# Patient Record
Sex: FEMALE | Race: BLACK OR AFRICAN AMERICAN | NOT HISPANIC OR LATINO | Employment: PART TIME | URBAN - METROPOLITAN AREA
[De-identification: names, ages, dates, MRNs, and addresses within clinical notes are randomized per-mention and may not be internally consistent; named-entity substitution may affect disease eponyms.]

---

## 2017-04-19 ENCOUNTER — OFFICE VISIT (OUTPATIENT)
Dept: FAMILY MEDICINE | Facility: CLINIC | Age: 37
End: 2017-04-19
Attending: FAMILY MEDICINE
Payer: COMMERCIAL

## 2017-04-19 VITALS
SYSTOLIC BLOOD PRESSURE: 115 MMHG | DIASTOLIC BLOOD PRESSURE: 77 MMHG | BODY MASS INDEX: 31.96 KG/M2 | HEART RATE: 98 BPM | WEIGHT: 186.2 LBS

## 2017-04-19 DIAGNOSIS — N92.0 EXCESSIVE OR FREQUENT MENSTRUATION: ICD-10-CM

## 2017-04-19 DIAGNOSIS — G89.29 CHRONIC MIDLINE LOW BACK PAIN WITHOUT SCIATICA: ICD-10-CM

## 2017-04-19 DIAGNOSIS — M54.2 CERVICALGIA: Primary | ICD-10-CM

## 2017-04-19 DIAGNOSIS — M54.50 CHRONIC MIDLINE LOW BACK PAIN WITHOUT SCIATICA: ICD-10-CM

## 2017-04-19 DIAGNOSIS — K21.9 GASTROESOPHAGEAL REFLUX DISEASE WITHOUT ESOPHAGITIS: ICD-10-CM

## 2017-04-19 PROCEDURE — 99212 OFFICE O/P EST SF 10 MIN: CPT | Mod: ZF

## 2017-04-19 RX ORDER — NICOTINE POLACRILEX 4 MG/1
40 GUM, CHEWING ORAL DAILY
Qty: 180 TABLET | Refills: 0 | Status: SHIPPED | OUTPATIENT
Start: 2017-04-19 | End: 2017-07-18

## 2017-04-19 RX ORDER — MULTIVIT,IRON,MINERALS/LUTEIN
1 TABLET ORAL DAILY
Qty: 90 TABLET | Refills: 3 | Status: SHIPPED | OUTPATIENT
Start: 2017-04-19 | End: 2017-07-18

## 2017-04-19 RX ORDER — HYDROCODONE BITARTRATE AND ACETAMINOPHEN 5; 325 MG/1; MG/1
1-2 TABLET ORAL EVERY 4 HOURS PRN
Qty: 15 TABLET | Refills: 0 | Status: CANCELLED | OUTPATIENT
Start: 2017-04-19

## 2017-04-19 ASSESSMENT — PAIN SCALES - GENERAL: PAINLEVEL: SEVERE PAIN (6)

## 2017-04-19 NOTE — MR AVS SNAPSHOT
After Visit Summary   4/19/2017    Tanya Denny    MRN: [id number]           Patient Information     Date Of Birth          1980        Visit Information        Provider Department      4/19/2017 10:40 AM Frandy Lutz MD [facility]        Today's Diagnoses     Cervicalgia    -  1    Excessive or frequent menstruation        Gastroesophageal reflux disease without esophagitis        Chronic midline low back pain without sciatica           Follow-ups after your visit        Additional Services     PHYSICAL THERAPY REFERRAL       [facility] provides Physical Therapy evaluation and treatment and many specialty services across the [facility].  If requesting a specialty program, please choose from the list below.    If you have not heard from the scheduling office within 2 business days, please call [phone] for all locations, with the exception of [place], please call [phone].  Treatment: Evaluation & Treatment  Special Instructions/Modalities:   Special Programs: back and neck pain, soft tissue    Please be aware that coverage of these services is subject to the terms and limitations of your health insurance plan.  Call member services at your health plan with any benefit or coverage questions.      **Note to Provider:  If you are referring outside of [place] for the therapy appointment, please list the name of the location in the  special instructions  above, print the referral and give to the patient to schedule the appointment.                  Follow-up notes from your care team     Return in about 4 months (around 8/19/2017).      Who to contact     Please call your clinic at [phone] to:    Ask questions about your health    Make or cancel appointments    Discuss your medicines    Learn about your test results    Speak to your doctor   If you have compliments or concerns about an experience at your clinic, or if you  wish to file a complaint, please contact [facility] Patient Relations at [phone] or email us at [email or web address]         Additional Information About Your Visit        Typo Keyboardshart Information     [employer] gives you secure access to your electronic health record. If you see a primary care provider, you can also send messages to your care team and make appointments. If you have questions, please call your primary care clinic.  If you do not have a primary care provider, please call [phone] and they will assist you.      [employer] is an electronic gateway that provides easy, online access to your medical records. With [employer], you can request a clinic appointment, read your test results, renew a prescription or communicate with your care team.     To access your existing account, please contact your [facility] or call [phone] for assistance.        Care EveryWhere ID     This is your Care EveryWhere ID. This could be used by other organizations to access your [place] medical records  [id number]        Your Vitals Were     Pulse Last Period Breastfeeding? BMI (Body Mass Index)          98 04/12/2017 No 31.96 kg/m2         Blood Pressure from Last 3 Encounters:   04/19/17 115/77   12/13/16 117/76   10/12/16 104/64    Weight from Last 3 Encounters:   04/19/17 84.5 kg (186 lb 3.2 oz)   12/13/16 82.5 kg (181 lb 14.4 oz)   10/12/16 79.4 kg (175 lb)              We Performed the Following     PHYSICAL THERAPY REFERRAL          Where to get your medicines      These medications were sent to [facility]/pharmacy #[id number] - [place], [place] - [address]  [address] [address]     Phone:  [phone]     MULTI PRENATAL 27-0.8 MG Tabs    omeprazole 20 MG tablet          Primary Care Provider    Pcp Unknown Verified       No address on file        Thank you!     Thank you for choosing [facility]  for your  care. Our goal is always to provide you with excellent care. Hearing back from our patients is one way we can continue to improve our services. Please take a few minutes to complete the written survey that you may receive in the mail after your visit with us. Thank you!             Your Updated Medication List - Protect others around you: Learn how to safely use, store and throw away your medicines at www.disposemymeds.org.          This list is accurate as of: 4/19/17 11:59 PM.  Always use your most recent med list.                   Brand Name Dispense Instructions for use    ferrous sulfate 325 (65 FE) MG tablet    IRON    90 tablet    Take 1 tablet (325 mg) by mouth 2 times daily For 4 weeks, then daily for 4 weeks       fluconazole 150 MG tablet    DIFLUCAN    1 tablet    Take 1 tablet (150 mg) by mouth every 3 days       MULTI PRENATAL 27-0.8 MG Tabs     90 tablet    Take 1 tablet by mouth daily       omeprazole 20 MG tablet     180 tablet    Take 2 tablets (40 mg) by mouth daily Take 30-60 minutes before a meal.

## 2017-04-19 NOTE — PROGRESS NOTES
HPI  Pt. Here for headaches, neck and back pain    1. Headaches--Last seen by me 1 year ago, given imitrex. Headaches felt better for long time, but increased in frequency and severity x 1 month. No changes in life or health 1 month ago. Severity and intensity vary by week, with this week headaches daily from Saturday to Wednesday. Past visits, HA's were unilateral and throbbing.    Now, wakes up with HA, squeezing pain at neck and radiates to forehead. No nausea, but photophobia, tried ice pack. Ibuprofen 600 mg helps sometimes, imitrex does not. Of note, Periods heavy and erratic; trying to lose weight, portion control.     2. Neck and back pain--same chronic pain in past. Physical therapy from last year was helpful, seeing PT now but needs referral to [facility]. No weakness or numbness, no problems with urination.     3. Finger/thumb pain R hand: MCP and soft tissue around base index and thumb recently painful after repetitive hard work--brushing hair, gardening, houswork. No redness or warmth, does swell occasionally.   4. Wt--discusse than recent wt gain from 10/2016 to now, eats sweets  is sweets, soluttion no keep sweets in house    Review of Systems     Cardiovascular:  Negative for edema.   All other systems reviewed and are negative.      Physical Exam   Constitutional: She is oriented to person, place, and time and well-developed, well-nourished, and in no distress.   Eyes: Pupils are equal, round, and reactive to light.   Cardiovascular: Normal rate, regular rhythm and normal heart sounds.    Pulmonary/Chest: Effort normal and breath sounds normal.   Musculoskeletal: Normal range of motion. She exhibits tenderness. She exhibits no edema.   MCP: nontender  Neck and back: soft tissues tender to palpation bilaterally, vertebrae nontender   Neurological: She is alert and oriented to person, place, and time. She has normal reflexes. No cranial nerve deficit. She exhibits normal muscle tone.  Gait normal. Coordination normal.     A/P  1. Chronic HA, now tension type  Recommend not using imitrex for this HA, can use her ibuprofen. PT should assist with this, also trial of flexeril 10 mg prn  2. Neck and back pain--will refer to PT as above  3. Hand/finger pain--counseled on repetitive motion injury, avoid doing all tasks in same day,   4. Weight--counseled on diet, managing weight while taking care of children and house, focus on 10# weight loss; patient led plan for keeping sweets out of house    F/u 4 months

## 2017-04-19 NOTE — LETTER
4/19/2017       RE: Tanya Denny  [address]  [place] [address]     Dear Colleague,    Thank you for referring your patient, Tanya Denny, to the [facility] at [facility]. Please see a copy of my visit note below.    HPI  Pt. Here for headaches, neck and back pain    1. Headaches--Last seen by me 1 year ago, given imitrex. Headaches felt better for long time, but increased in frequency and severity x 1 month. No changes in life or health 1 month ago. Severity and intensity vary by week, with this week headaches daily from Saturday to Wednesday. Past visits, HA's were unilateral and throbbing.    Now, wakes up with HA, squeezing pain at neck and radiates to forehead. No nausea, but photophobia, tried ice pack. Ibuprofen 600 mg helps sometimes, imitrex does not. Of note, Periods heavy and erratic; trying to lose weight, portion control.     2. Neck and back pain--same chronic pain in past. Physical therapy from last year was helpful, seeing PT now but needs referral to [facility]. No weakness or numbness, no problems with urination.     3. Finger/thumb pain R hand: MCP and soft tissue around base index and thumb recently painful after repetitive hard work--brushing hair, gardening, houswork. No redness or warmth, does swell occasionally.   4. Wt--discusse than recent wt gain from 10/2016 to now, eats sweets  is sweets, soluttion no keep sweets in house    Review of Systems     Cardiovascular:  Negative for edema.   All other systems reviewed and are negative.      Physical Exam   Constitutional: She is oriented to person, place, and time and well-developed, well-nourished, and in no distress.   Eyes: Pupils are equal, round, and reactive to light.   Cardiovascular: Normal rate, regular rhythm and normal heart sounds.    Pulmonary/Chest: Effort normal and breath sounds normal.   Musculoskeletal: Normal range  of motion. She exhibits tenderness. She exhibits no edema.   MCP: nontender  Neck and back: soft tissues tender to palpation bilaterally, vertebrae nontender   Neurological: She is alert and oriented to person, place, and time. She has normal reflexes. No cranial nerve deficit. She exhibits normal muscle tone. Gait normal. Coordination normal.     A/P  1. Chronic HA, now tension type  Recommend not using imitrex for this HA, can use her ibuprofen. PT should assist with this, also trial of flexeril 10 mg prn  2. Neck and back pain--will refer to PT as above  3. Hand/finger pain--counseled on repetitive motion injury, avoid doing all tasks in same day,   4. Weight--counseled on diet, managing weight while taking care of children and house, focus on 10# weight loss; patient led plan for keeping sweets out of house    F/u 4 months    Again, thank you for allowing me to participate in the care of your patient.      Sincerely,    Frandy Lutz MD

## 2017-04-19 NOTE — NURSING NOTE
Chief Complaint   Patient presents with     RECHECK     C/O headache and neck pain   Alysia Rogel LPN

## 2017-04-20 ENCOUNTER — TELEPHONE (OUTPATIENT)
Dept: OBGYN | Facility: CLINIC | Age: 37
End: 2017-04-20

## 2017-04-20 DIAGNOSIS — G43.009 MIGRAINE WITHOUT AURA AND WITHOUT STATUS MIGRAINOSUS, NOT INTRACTABLE: ICD-10-CM

## 2017-04-20 DIAGNOSIS — G89.29 OTHER CHRONIC PAIN: ICD-10-CM

## 2017-04-20 DIAGNOSIS — G44.219 EPISODIC TENSION-TYPE HEADACHE, NOT INTRACTABLE: ICD-10-CM

## 2017-04-20 DIAGNOSIS — N92.0 EXCESSIVE OR FREQUENT MENSTRUATION: ICD-10-CM

## 2017-04-20 RX ORDER — IBUPROFEN 200 MG
600 TABLET ORAL EVERY 6 HOURS PRN
Qty: 60 TABLET | Refills: 1 | Status: SHIPPED | OUTPATIENT
Start: 2017-04-20 | End: 2017-08-02

## 2017-04-20 RX ORDER — CYCLOBENZAPRINE HCL 10 MG
10 TABLET ORAL 3 TIMES DAILY PRN
Qty: 30 TABLET | Refills: 3 | Status: SHIPPED | OUTPATIENT
Start: 2017-04-20 | End: 2017-08-02

## 2017-04-20 RX ORDER — SUMATRIPTAN 50 MG/1
50 TABLET, FILM COATED ORAL
Qty: 10 TABLET | Refills: 3 | Status: SHIPPED | OUTPATIENT
Start: 2017-04-20 | End: 2017-08-02

## 2017-04-20 NOTE — TELEPHONE ENCOUNTER
----- Message from Xochitl Dentason sent at 4/20/2017 10:51 AM CDT -----  Regarding: Requesting new RX  Contact: [phone]  PT states her prenatal vitamin contains pork fat which she cannot have, PT is requesting an RX for a new prenatal vitamin.    PT is also requesting a refill on her headache medication- PT could not give me the name of the current RX.    Please call PT with any questions-  [phone].    Thank you!  Xochitl  Call Center

## 2017-04-20 NOTE — TELEPHONE ENCOUNTER
Spoke to Tanya who will take a flexeril and ibuprofen refills. I relayed Dr Ware's message that she will not prescribe her narcotics.  Dr badillo already did the refills for both flexeril and ibuprofen  Pt indicated understanding and agreed with plan.

## 2017-04-20 NOTE — TELEPHONE ENCOUNTER
Tanya requesting PNV be re-ordered to one that doesn't contain gelatin(pork).    She saw Dr Lutz in clinic yesterday,4/19/17 and per note, Imitrex  not giving her relief. Dr Lutz didn't refill this medication.    I attempted to reach Tanya by phone,left  for her to call triage to discuss which other medication she wants refilled for headaches.    I called and spoke to pharmacist at [facility] in [facility].   She reports Tanya's insurance no longer covering Trinate prenatal , the prenatal we usually order since it doesn't contain pork.    The pharmacist will call Tanya directly to figure out which PNV she can give her which is covered by her insurance.

## 2017-04-20 NOTE — TELEPHONE ENCOUNTER
Spoke to Tanya and informed her the pharmacist from [facility] was going to call her about PNV she wants.    Tanya not with her cell phone,so she will call the pharmacy. I informed her Trinate PNV isn't covered by her insurance.    As for the other medication she used for headaches in past, she was asking for a refill was the Norco. This wasn't prescribed by Dr Lutz originally. As I was explaining this is a narcotic, needing provider approval the phone line was disconnected. Im not sure if she hung up on me or we just lost connection.

## 2017-04-20 NOTE — TELEPHONE ENCOUNTER
----- Message from Frandy Lutz MD sent at 4/20/2017  2:31 PM CDT -----  I did tell her I would not refill or give narcotic. I will refill flexeril and ibuprofen if she is out of them.     Frandy    ----- Message -----     From: Anais Do RN     Sent: 4/20/2017   2:24 PM       To: Frandy Lutz MD    Hi DR Lutz,  This patient called and is wanting some pain medication for her headaches. She mentioned that you were going to send something to her pharmacy and she did not get this. Please advise, thank you Anais STANTON

## 2017-05-03 ENCOUNTER — TRANSFERRED RECORDS (OUTPATIENT)
Dept: HEALTH INFORMATION MANAGEMENT | Facility: CLINIC | Age: 37
End: 2017-05-03

## 2017-05-07 ENCOUNTER — TRANSFERRED RECORDS (OUTPATIENT)
Dept: HEALTH INFORMATION MANAGEMENT | Facility: CLINIC | Age: 37
End: 2017-05-07

## 2017-06-26 ENCOUNTER — CARE COORDINATION (OUTPATIENT)
Dept: CARE COORDINATION | Facility: CLINIC | Age: 37
End: 2017-06-26

## 2017-06-26 NOTE — LETTER
[address] [address]       June 26, 2017      Tanya Denny  [address] [place] [id number]    Dear Tanya,  I am one of the Clinic Care Coordinator that works with [facility]. I wanted to introduce myself and provide you with my contact information for you to be able to call me with any questions or concerns. Below is a description of what Clinic Care Coordination is and how I can further assist you.     The Clinic Care Coordinator role is a Registered Nurse and/or  who understands the health care system. The goal of Clinic Care Coordination is to help you manage your health and improve access to the [facility] in the most efficient manner.  The Registered Nurse can assist you in meeting your health care goals by providing education, coordinating services, and strengthening the communication among your providers. The  can assist you with financial, behavioral, psychosocial, and chemical dependency and counseling/psychiatric resources.    Please feel free to keep this letter and contact information to contact me at [phone] with any further questions or concerns that may arise. We at [place] are focused on providing you with the highest-quality healthcare experience possible and that all starts with you.       Sincerely,     Melissa Behl BSN, RN, PHN  [facility] Care Coordinator  [phone]  [email or web address]      Enclosed: I have enclosed a copy of a 24 Hour Access Plan. This has helpful phone numbers for you to call when needed. Please keep this in an easy to access place to use as needed.

## 2017-06-26 NOTE — PROGRESS NOTES
Clinic Care Coordination Contact  [facility]/Voicemail    Referral Source: [employer] Outreach  Clinical Data: Care Coordinator Outreach  Outreach attempted x 1.  Left message on voicemail with call back information and requested return call.  Plan: Care Coordinator will mail out care coordination introduction letter with care coordinator contact information and explanation of care coordination services. Care Coordinator will try to reach patient again in 3-5 business days.    Melissa Behl BSN, RN, PHN  [facility] Care Coordinator  [phone]  [email or web address]

## 2017-06-26 NOTE — LETTER
Health Care Home - Access Care Plan    About Me  Patient Name:  Tanya Denny    YOB: 1980  Age:                            37 year old   Glory MRN:         [id number] Telephone Information:     Home Phone [phone]   Mobile [phone]       Address:    [address]  [place] [address] Email address:  [email or web address]      Emergency Contact(s)  Name Relationship Lgl Grd Work Phone Home Phone Mobile Phone   LISA BAUER Spouse  [phone]             Health Maintenance: Routine Health maintenance Reviewed: Due/Overdue     My Access Plan  Medical Emergency 911   Questions or concerns during clinic hours Primary Clinic Line, I will call the clinic directly: Primary Clinic: Other (unknown)   24 Hour Appointment Line [phone] or  [phone]  (toll free)   24 Hour Nurse Line [phone] (toll free)   Questions or concerns outside clinic hours 24 Hour Appointment Line, I will call the after-hours on-call line:   [facility] [phone] or [phone]) (toll-free)   Preferred Urgent Care     Preferred Hospital     Preferred Pharmacy [id number] - [place], [place] - [address]     Behavioral Health Crisis Line Crisis Connection, [phone] or 911     My Care Team Members  Patient Care Team       Relationship Specialty Notifications Start End    Verified, Pcp Unknown PCP - General   6/14/16     Krystal Aguilar MD MD Family Practice  5/21/15     Phone: [phone] Fax: [phone]         [facility] [address] [facility] [address]    Madai Stevenson MD PhD MD Family Practice  7/8/15     Phone: [phone] Fax: [phone]          [facility] [address]  [facility] [address]    Frandy Lutz MD MD Family Practice  7/21/15     Phone: [phone] Fax: [phone]          [facility] [address] [facility] [address]     Krystal Aguilar MD MD Family Practice  6/14/16     Phone: [phone] Fax: [phone]         [facility] [address] [place], [place] [address] [address]    Katie Fan MD MD OB/Gyn  6/14/16     Phone: [phone] Fax: [phone]         [facility] [address] [address]    Amira Early MD MD Internal Medicine  4/7/17     Phone: [phone] Fax: [phone]         [facility] [address] [place], [place] [address] [facility] [address]        My Medical and Care Information  Problem List   Patient Active Problem List   Diagnosis     CARDIOVASCULAR SCREENING; LDL GOAL LESS THAN 160     Dyspepsia     Lower back pain     Lumbago     Overweight     Chronic mixed headache syndrome     Uterine leiomyoma, unspecified location      Current Medications and Allergies:  See printed Medication Report

## 2017-06-26 NOTE — PROGRESS NOTES
"Clinic Care Coordination Contact  OUTREACH    Referral Information:  Referral Source: [employer] Outreach  Reason for Contact: RN CC initial contact  Care Conference: No     Universal Utilization:   ED Visits in last year: 1  Hospital visits in last year: 0  Last PCP appointment: 04/19/17  Missed Appointments: 0          Clinical Concerns:  Current Medical Concerns: Patient is working with the [facility].  Patient states she has not picked a primary care provider at this time and is trying to find \"a nice female doctor.\"  Patient states she typically will call and see who can ever fit her in at her primary care clinic when she is sick.  \"I feel like I am a healthy woman.  I am working with Dr. Lutz for my headaches.\"  Patient denies needing any assistance at this time.  Current Behavioral Concerns: n/a    Education Provided to patient: RN CC educated patient on care coordination services.   Clinical Pathway Name: None    Medication Management:  Patient independent in medication management and verbalizes adherence and understanding of medication regimen.       Functional Status:  Mobility Status: Independent     Transportation: Denies concerns.           Psychosocial:  Current living arrangement:: I live in a private home with family  Financial/Insurance: Denies concerns.       Resources and Interventions:  Current Resources:  ;          Advanced Care Plans/Directives on file:: No       Patient/Caregiver understanding: Patient verbalized understanding of care coordination services, but does not feel she needs them at this time and is not seen at [facility] for primary care.  Patient states she will use [facility] for urgent care of if urgent needs arise.       Upcoming appointment:  (none scheduled)     Plan:   Patient will work on determining who her PCP will be.  RN CC will mail out care coordination letter, brochure and action plan.  RN CC will make no " further outreaches, as patient denies the need for care coordination services and is not seen at [facility] for primary care.    Melissa Behl BSN, RN, PHN  [facility] Care Coordinator  [phone]  [email or web address]

## 2017-07-05 ENCOUNTER — OFFICE VISIT (OUTPATIENT)
Dept: INTERNAL MEDICINE | Facility: CLINIC | Age: 37
End: 2017-07-05
Attending: INTERNAL MEDICINE
Payer: COMMERCIAL

## 2017-07-05 VITALS
HEIGHT: 64 IN | DIASTOLIC BLOOD PRESSURE: 77 MMHG | SYSTOLIC BLOOD PRESSURE: 109 MMHG | WEIGHT: 180.5 LBS | BODY MASS INDEX: 30.81 KG/M2 | HEART RATE: 66 BPM

## 2017-07-05 DIAGNOSIS — R53.83 FATIGUE, UNSPECIFIED TYPE: Primary | ICD-10-CM

## 2017-07-05 DIAGNOSIS — R11.0 NAUSEA: ICD-10-CM

## 2017-07-05 LAB
ALBUMIN SERPL-MCNC: 3.8 G/DL (ref 3.4–5)
ALP SERPL-CCNC: 42 U/L (ref 40–150)
ALT SERPL W P-5'-P-CCNC: 15 U/L (ref 0–50)
ANION GAP SERPL CALCULATED.3IONS-SCNC: 10 MMOL/L (ref 3–14)
AST SERPL W P-5'-P-CCNC: 15 U/L (ref 0–45)
B-HCG SERPL-ACNC: <1 IU/L (ref 0–5)
BILIRUB SERPL-MCNC: 0.4 MG/DL (ref 0.2–1.3)
BUN SERPL-MCNC: 12 MG/DL (ref 7–30)
CALCIUM SERPL-MCNC: 8.6 MG/DL (ref 8.5–10.1)
CHLORIDE SERPL-SCNC: 110 MMOL/L (ref 94–109)
CO2 SERPL-SCNC: 23 MMOL/L (ref 20–32)
CREAT SERPL-MCNC: 0.72 MG/DL (ref 0.52–1.04)
ERYTHROCYTE [DISTWIDTH] IN BLOOD BY AUTOMATED COUNT: 12.7 % (ref 10–15)
FERRITIN SERPL-MCNC: 11 NG/ML (ref 12–150)
GFR SERPL CREATININE-BSD FRML MDRD: ABNORMAL ML/MIN/1.7M2
GLUCOSE SERPL-MCNC: 88 MG/DL (ref 70–99)
HCT VFR BLD AUTO: 34.7 % (ref 35–47)
HGB BLD-MCNC: 11.4 G/DL (ref 11.7–15.7)
IRON SATN MFR SERPL: 18 % (ref 15–46)
IRON SERPL-MCNC: 68 UG/DL (ref 35–180)
MCH RBC QN AUTO: 26.7 PG (ref 26.5–33)
MCHC RBC AUTO-ENTMCNC: 32.9 G/DL (ref 31.5–36.5)
MCV RBC AUTO: 81 FL (ref 78–100)
PLATELET # BLD AUTO: 224 10E9/L (ref 150–450)
POTASSIUM SERPL-SCNC: 3.9 MMOL/L (ref 3.4–5.3)
PROT SERPL-MCNC: 7.5 G/DL (ref 6.8–8.8)
RBC # BLD AUTO: 4.27 10E12/L (ref 3.8–5.2)
SODIUM SERPL-SCNC: 143 MMOL/L (ref 133–144)
TIBC SERPL-MCNC: 374 UG/DL (ref 240–430)
TSH SERPL DL<=0.005 MIU/L-ACNC: 0.98 MU/L (ref 0.4–4)
WBC # BLD AUTO: 4.7 10E9/L (ref 4–11)

## 2017-07-05 PROCEDURE — 83540 ASSAY OF IRON: CPT | Performed by: INTERNAL MEDICINE

## 2017-07-05 PROCEDURE — 99212 OFFICE O/P EST SF 10 MIN: CPT | Mod: ZF

## 2017-07-05 PROCEDURE — 84443 ASSAY THYROID STIM HORMONE: CPT | Performed by: INTERNAL MEDICINE

## 2017-07-05 PROCEDURE — 36415 COLL VENOUS BLD VENIPUNCTURE: CPT | Performed by: INTERNAL MEDICINE

## 2017-07-05 PROCEDURE — 82728 ASSAY OF FERRITIN: CPT | Performed by: INTERNAL MEDICINE

## 2017-07-05 PROCEDURE — 80053 COMPREHEN METABOLIC PANEL: CPT | Performed by: INTERNAL MEDICINE

## 2017-07-05 PROCEDURE — 83550 IRON BINDING TEST: CPT | Performed by: INTERNAL MEDICINE

## 2017-07-05 PROCEDURE — 85027 COMPLETE CBC AUTOMATED: CPT | Performed by: INTERNAL MEDICINE

## 2017-07-05 PROCEDURE — 84702 CHORIONIC GONADOTROPIN TEST: CPT | Performed by: INTERNAL MEDICINE

## 2017-07-05 RX ORDER — OMEPRAZOLE 40 MG/1
40 CAPSULE, DELAYED RELEASE ORAL DAILY
Qty: 30 CAPSULE | Refills: 1 | Status: SHIPPED | OUTPATIENT
Start: 2017-07-05 | End: 2017-08-04

## 2017-07-05 ASSESSMENT — ENCOUNTER SYMPTOMS
DIARRHEA: 0
JOINT SWELLING: 0
INSOMNIA: 0
BREAST PAIN: 0
COUGH: 0
DYSURIA: 0
RESPIRATORY PAIN: 0
LEG SWELLING: 0
LEG PAIN: 0
ALTERED TEMPERATURE REGULATION: 0
BACK PAIN: 0
SWOLLEN GLANDS: 0
POLYPHAGIA: 0
NERVOUS/ANXIOUS: 0
HEARTBURN: 0
CHILLS: 0
ABDOMINAL PAIN: 0
FLANK PAIN: 0
DIZZINESS: 1
POLYDIPSIA: 0
BREAST MASS: 0
DECREASED APPETITE: 1
BLOOD IN STOOL: 0
HEMATURIA: 0
BRUISES/BLEEDS EASILY: 0
CONSTIPATION: 0
DEPRESSION: 0
DYSPNEA ON EXERTION: 0
VOMITING: 0
FEVER: 0
SHORTNESS OF BREATH: 0
DECREASED CONCENTRATION: 0
DISTURBANCES IN COORDINATION: 0
NAUSEA: 1
BLOATING: 1
PANIC: 0
ARTHRALGIAS: 0
FATIGUE: 1
HEADACHES: 0

## 2017-07-05 NOTE — MR AVS SNAPSHOT
After Visit Summary   7/5/2017    Tanya Denny    MRN: [id number]           Patient Information     Date Of Birth          1980        Visit Information        Provider Department      7/5/2017 2:00 PM Amira Early MD [facility]         Today's Diagnoses     Fatigue, unspecified type    -  1    Nausea           Follow-ups after your visit        Follow-up notes from your care team     Return in about 1 month (around 8/5/2017).      Your next 10 appointments already scheduled     Aug 02, 2017  2:20 PM CDT   Return Visit with Amira Early MD   [facility]  ([facility])    [facility]  3rd [facility]  [address] [address] [phone]              Future tests that were ordered for you today     Open Future Orders        Priority Expected Expires Ordered    H Pylori antigen stool Routine  8/4/2017 7/5/2017            Who to contact     Please call your clinic at [phone] to:    Ask questions about your health    Make or cancel appointments    Discuss your medicines    Learn about your test results    Speak to your doctor   If you have compliments or concerns about an experience at your clinic, or if you wish to file a complaint, please contact [facility] Patient Relations at [phone] or email us at [email or web address]         Additional Information About Your Visit        MyChart Information     [employer] gives you secure access to your electronic health record. If you see a primary care provider, you can also send messages to your care team and make appointments. If you have questions, please call your primary care clinic.  If you do not have a primary care provider, please call [phone] and they will assist you.      [employer] is an electronic gateway that provides easy, online access to your medical records. With [employer], you  "can request a clinic appointment, read your test results, renew a prescription or communicate with your care team.     To access your existing account, please contact your [facility] or call [phone] for assistance.        Care EveryWhere ID     This is your Care EveryWhere ID. This could be used by other organizations to access your [facility] medical records  [id number]        Your Vitals Were     Pulse Height Last Period BMI (Body Mass Index)          66 1.626 m (5' 4\") 06/10/2017 (Approximate) 30.98 kg/m2         Blood Pressure from Last 3 Encounters:   07/05/17 109/77   04/19/17 115/77   12/13/16 117/76    Weight from Last 3 Encounters:   07/05/17 81.9 kg (180 lb 8 oz)   04/19/17 84.5 kg (186 lb 3.2 oz)   12/13/16 82.5 kg (181 lb 14.4 oz)              We Performed the Following     CBC with Platelets     Comprehensive metabolic panel     Ferritin     HCG Quantitative Pregnancy     Iron and Iron Binding Capacity     TSH with free T4 reflex          Today's Medication Changes          These changes are accurate as of: 7/5/17  3:40 PM.  If you have any questions, ask your nurse or doctor.               These medicines have changed or have updated prescriptions.        Dose/Directions    * omeprazole 20 MG tablet   This may have changed:  Another medication with the same name was added. Make sure you understand how and when to take each.   Used for:  Gastroesophageal reflux disease without esophagitis        Dose:  40 mg   Take 2 tablets (40 mg) by mouth daily Take 30-60 minutes before a meal.   Quantity:  180 tablet   Refills:  0       * omeprazole 40 MG capsule   Commonly known as:  priLOSEC   This may have changed:  You were already taking a medication with the same name, and this prescription was added. Make sure you understand how and when to take each.   Used for:  Nausea        Dose:  40 mg   Take 1 capsule (40 mg) by mouth daily Take 30-60 minutes before a meal. "   Quantity:  30 capsule   Refills:  1       * Notice:  This list has 2 medication(s) that are the same as other medications prescribed for you. Read the directions carefully, and ask your doctor or other care provider to review them with you.         Where to get your medicines      These medications were sent to [facility]/pharmacy #[id number] - [place], [place] - [address]  [address], [facility] [address]     Phone:  [phone]     omeprazole 40 MG capsule                Primary Care Provider    Pcp Unknown Verified       No address on file        Equal Access to Services     [facility]: Hadii rush mcbride hadasho Soomaali, waaxda luqadaha, qaybta kaalmada adeegyasheyla, sherri diaz . So [facility] [phone].    ATENCIÓN: Si habla español, tiene a bautista disposición servicios gratuitos de asistencia lingüística. Llame al [phone].    We comply with applicable federal civil rights laws and [place] laws. We do not discriminate on the basis of race, color, national origin, age, disability sex, sexual orientation or gender identity.            Thank you!     Thank you for choosing [facility]   for your care. Our goal is always to provide you with excellent care. Hearing back from our patients is one way we can continue to improve our services. Please take a few minutes to complete the written survey that you may receive in the mail after your visit with us. Thank you!             Your Updated Medication List - Protect others around you: Learn how to safely use, store and throw away your medicines at www.disposemymeds.org.          This list is accurate as of: 7/5/17  3:40 PM.  Always use your most recent med list.                   Brand Name Dispense Instructions for use Diagnosis    cyclobenzaprine 10 MG tablet    FLEXERIL    30 tablet    Take 1 tablet (10 mg) by mouth 3 times daily as needed for muscle spasms    Episodic tension-type headache, not intractable        ferrous sulfate 325 (65 FE) MG tablet    IRON    90 tablet    Take 1 tablet (325 mg) by mouth 2 times daily For 4 weeks, then daily for 4 weeks    Iron deficiency anemia       fluconazole 150 MG tablet    DIFLUCAN    1 tablet    Take 1 tablet (150 mg) by mouth every 3 days    Pruritus of vulva       ibuprofen 200 MG tablet    ADVIL/MOTRIN    60 tablet    Take 3 tablets (600 mg) by mouth every 6 hours as needed for mild pain    Other chronic pain       MULTI PRENATAL 27-0.8 MG Tabs     90 tablet    Take 1 tablet by mouth daily    Excessive or frequent menstruation       * omeprazole 20 MG tablet     180 tablet    Take 2 tablets (40 mg) by mouth daily Take 30-60 minutes before a meal.    Gastroesophageal reflux disease without esophagitis       * omeprazole 40 MG capsule    priLOSEC    30 capsule    Take 1 capsule (40 mg) by mouth daily Take 30-60 minutes before a meal.    Nausea       SUMAtriptan 50 MG tablet    IMITREX    10 tablet    Take 1 tablet (50 mg) by mouth at onset of headache for migraine    Migraine without aura and without status migrainosus, not intractable       * Notice:  This list has 2 medication(s) that are the same as other medications prescribed for you. Read the directions carefully, and ask your doctor or other care provider to review them with you.

## 2017-07-05 NOTE — PROGRESS NOTES
HPI  Patient is here for evaluation of fatigue. She reports that fatigue and dizziness for the last 3 weeks. Patient reports that she was feeling well prior to that. She also feels very tired. She reports that was fasting for Ramadan. Her symptoms have started with out any warning. She has been sleeping 6-8 hours a night. She states that she has been falling asleep sitting on the couch. She occasionally wakes up not feeling rested. She denies vision changes. She has not been taking any new medications. She has stopped taking all medications because she may be pregnant. She has some nausea, no vomiting. She denies abdominal pain. She denies palpitations or shortness of breath. She denies headaches. She denies significant levels of stress at home and at work. She was camping with kids recently. She was at the camp for a week. She was staying at the tent. She denies any recent trips besides recent camping trip. She works as an , also works at day care.     Review of Systems     Constitutional:  Positive for fatigue and decreased appetite. Negative for fever, chills and recent stressors.   HENT:  Negative for ear pain and dry mouth.    Eyes:  Negative for decreased vision.   Respiratory:   Negative for cough, shortness of breath, respiratory pain and dyspnea on exertion.    Cardiovascular:  Negative for chest pain, dyspnea on exertion, leg swelling, leg pain and edema.   Gastrointestinal:  Positive for nausea and bloating. Negative for heartburn, vomiting, abdominal pain, diarrhea, constipation, blood in stool and change in stool.   Genitourinary:  Negative for dysuria, hematuria, flank pain and menstrual changes.   Musculoskeletal:  Negative for back pain, joint swelling, arthralgias and bone pain.   Skin:  Negative for itching and rash.   Neurological:  Positive for dizziness. Negative for headaches, disturbances in coordination and difficulty walking.   Endo/Heme:  Negative for anemia, swollen glands and  bruises/bleeds easily.   Psychiatric/Behavioral:  Negative for depression, decreased concentration, mood swings and panic attacks.    Breast:  Negative for breast discharge, breast mass, breast pain and nipple retraction.   Endocrine:  Negative for altered temperature regulation, polyphagia, polydipsia, unwanted hair growth and change in facial hair.    Current Outpatient Prescriptions   Medication     cyclobenzaprine (FLEXERIL) 10 MG tablet     ibuprofen (ADVIL/MOTRIN) 200 MG tablet     SUMAtriptan (IMITREX) 50 MG tablet     Prenatal Vit-Fe Fumarate-FA (MULTI PRENATAL) 27-0.8 MG TABS     omeprazole 20 MG tablet     fluconazole (DIFLUCAN) 150 MG tablet     ferrous sulfate (IRON) 325 (65 FE) MG tablet     No current facility-administered medications for this visit.      Past Medical History:   Diagnosis Date     SAB (spontaneous )     1ST TRIMESTER     Past Surgical History:   Procedure Laterality Date     D & C       Family History   Problem Relation Age of Onset     Allergies Mother      angioedema in family members unspecified     DIABETES Mother      Hypertension Mother      DIABETES Father      Hypertension Father      DIABETES Maternal Grandmother      Hypertension Maternal Grandmother      DIABETES Maternal Grandfather      Hypertension Maternal Grandfather      Asthma Maternal Grandfather      CANCER No family hx of      CEREBROVASCULAR DISEASE No family hx of      Thyroid Disease No family hx of      Glaucoma No family hx of      Macular Degeneration No family hx of      Breast Cancer No family hx of      Colon Cancer No family hx of      Depression No family hx of      Anxiety Disorder No family hx of      Asthma Maternal Grandmother      Genetic Disorder No family hx of      Social History     Social History     Marital status:      Spouse name: N/A     Number of children: N/A     Years of education: N/A     Occupational History     Not on file.     Social History Main Topics      "Smoking status: Never Smoker     Smokeless tobacco: Never Used     Alcohol use No     Drug use: No     Sexual activity: Yes     Partners: Male     Birth control/ protection: None     Other Topics Concern     Parent/Sibling W/ Cabg, Mi Or Angioplasty Before 65f 55m? No      Service No     Blood Transfusions No     Caffeine Concern No     Occupational Exposure No     Hobby Hazards No     Sleep Concern No     Stress Concern No     Weight Concern No     Special Diet Yes     Back Care No     Exercise Yes     Bike Helmet No     Seat Belt Yes     Self-Exams Yes     Social History Narrative    Mother of 4, all starting school this yearWorks in  can bring her kids there too.  is a teacher , out of work now.         How much exercise per week? 3 days     How much calcium per day? 1 serving      How much caffeine per day? 3 cups    How much vitamin D per day?      Do you/your family wear seatbelts?  Yes    Do you/your family use safety helmets? na    Do you/your family use sunscreen?No    Do you/your family keep firearms in the home? No    Do you/your family have a smoke detector(s)? Yes        Do you feel safe in your home? Yes    Has anyone ever touched you in an unwanted manner?      Explain         [birth year], 2013 Shaneka Abbasi N                   Vitals:    07/05/17 1405   BP: 109/77   BP Location: Left arm   Patient Position: Chair   Cuff Size: Adult Regular   Pulse: 66   Weight: 81.9 kg (180 lb 8 oz)   Height: 1.626 m (5' 4\")         Physical Exam   Constitutional: She is oriented to person, place, and time and well-developed, well-nourished, and in no distress.   HENT:   Head: Normocephalic and atraumatic.   Right Ear: External ear normal.   Left Ear: External ear normal.   Nose: Nose normal.   Mouth/Throat: Oropharynx is clear and moist.   Eyes: Conjunctivae are normal. Pupils are equal, round, and reactive to light.   Neck: Normal range of motion. Neck supple. No thyromegaly present. "   Cardiovascular: Normal rate, regular rhythm and normal heart sounds.    Pulmonary/Chest: Effort normal and breath sounds normal.   Abdominal: Soft. Bowel sounds are normal. She exhibits no distension and no mass. There is no tenderness. There is no rebound and no guarding.   Musculoskeletal: She exhibits no edema.   Lymphadenopathy:     She has no cervical adenopathy.   Neurological: She is alert and oriented to person, place, and time. She exhibits normal muscle tone. Coordination normal.   Skin: Skin is warm and dry.   Psychiatric: Mood, memory, affect and judgment normal.   Vitals reviewed.    Assessment and Plan:  Tanya was seen today for consult for.    Diagnoses and all orders for this visit:    Fatigue, unspecified type. Patient was advised on possible causes of fatigue. Recommend evaluation for common treatable causes, including thyroid disease as well as iron deficiency. Patient will be advised on test results accordingly. Additional testing may be indicated for persistent fatigue with negative initial evaluation.   -     HCG Quantitative Pregnancy  -     CBC with Platelets  -     Ferritin  -     Iron and Iron Binding Capacity  -     TSH with free T4 reflex  -     Comprehensive metabolic panel    Nausea. Suspect gastritis. Recommend H.pylori testing. Patient was also prescribed omeprazole, instructions on administration were given today. Will follow up in 1 month, consider upper endoscopy with worsening or unresolving symptoms. Patient is in agreement with the plan.   -     H Pylori antigen stool; Future  -     omeprazole (PRILOSEC) 40 MG capsule; Take 1 capsule (40 mg) by mouth daily Take 30-60 minutes before a meal.        Total time spent 45  minutes.  More than 50% of the time spent with Ms. Denny on counseling / coordinating her care    Amira Early MD

## 2017-07-05 NOTE — NURSING NOTE
Chief Complaint   Patient presents with     Consult For     headache and fatigue       Lian Moore, [facility] 7/5/2017

## 2017-07-05 NOTE — LETTER
7/5/2017       RE: Tanya Denny  [address]  [place] [address]     Dear Colleague,    Thank you for referring your patient, Tanya Denny, to the [facility]  at [facility]. Please see a copy of my visit note below.    HPI  Patient is here for evaluation of fatigue. She reports that fatigue and dizziness for the last 3 weeks. Patient reports that she was feeling well prior to that. She also feels very tired. She reports that was fasting for Ramadan. Her symptoms have started with out any warning. She has been sleeping 6-8 hours a night. She states that she has been falling asleep sitting on the couch. She occasionally wakes up not feeling rested. She denies vision changes. She has not been taking any new medications. She has stopped taking all medications because she may be pregnant. She has some nausea, no vomiting. She denies abdominal pain. She denies palpitations or shortness of breath. She denies headaches. She denies significant levels of stress at home and at work. She was camping with kids recently. She was at the camp for a week. She was staying at the tent. She denies any recent trips besides recent camping trip. She works as an , also works at day care.     Review of Systems     Constitutional:  Positive for fatigue and decreased appetite. Negative for fever, chills and recent stressors.   HENT:  Negative for ear pain and dry mouth.    Eyes:  Negative for decreased vision.   Respiratory:   Negative for cough, shortness of breath, respiratory pain and dyspnea on exertion.    Cardiovascular:  Negative for chest pain, dyspnea on exertion, leg swelling, leg pain and edema.   Gastrointestinal:  Positive for nausea and bloating. Negative for heartburn, vomiting, abdominal pain, diarrhea, constipation, blood in stool and change in stool.   Genitourinary:  Negative for dysuria, hematuria, flank pain and  menstrual changes.   Musculoskeletal:  Negative for back pain, joint swelling, arthralgias and bone pain.   Skin:  Negative for itching and rash.   Neurological:  Positive for dizziness. Negative for headaches, disturbances in coordination and difficulty walking.   Endo/Heme:  Negative for anemia, swollen glands and bruises/bleeds easily.   Psychiatric/Behavioral:  Negative for depression, decreased concentration, mood swings and panic attacks.    Breast:  Negative for breast discharge, breast mass, breast pain and nipple retraction.   Endocrine:  Negative for altered temperature regulation, polyphagia, polydipsia, unwanted hair growth and change in facial hair.    Current Outpatient Prescriptions   Medication     cyclobenzaprine (FLEXERIL) 10 MG tablet     ibuprofen (ADVIL/MOTRIN) 200 MG tablet     SUMAtriptan (IMITREX) 50 MG tablet     Prenatal Vit-Fe Fumarate-FA (MULTI PRENATAL) 27-0.8 MG TABS     omeprazole 20 MG tablet     fluconazole (DIFLUCAN) 150 MG tablet     ferrous sulfate (IRON) 325 (65 FE) MG tablet     No current facility-administered medications for this visit.      Past Medical History:   Diagnosis Date     SAB (spontaneous )     1ST TRIMESTER     Past Surgical History:   Procedure Laterality Date     D & C       Family History   Problem Relation Age of Onset     Allergies Mother      angioedema in family members unspecified     DIABETES Mother      Hypertension Mother      DIABETES Father      Hypertension Father      DIABETES Maternal Grandmother      Hypertension Maternal Grandmother      DIABETES Maternal Grandfather      Hypertension Maternal Grandfather      Asthma Maternal Grandfather      CANCER No family hx of      CEREBROVASCULAR DISEASE No family hx of      Thyroid Disease No family hx of      Glaucoma No family hx of      Macular Degeneration No family hx of      Breast Cancer No family hx of      Colon Cancer No family hx of      Depression No family hx of      Anxiety  "Disorder No family hx of      Asthma Maternal Grandmother      Genetic Disorder No family hx of      Social History     Social History     Marital status:      Spouse name: N/A     Number of children: N/A     Years of education: N/A     Occupational History     Not on file.     Social History Main Topics     Smoking status: Never Smoker     Smokeless tobacco: Never Used     Alcohol use No     Drug use: No     Sexual activity: Yes     Partners: Male     Birth control/ protection: None     Other Topics Concern     Parent/Sibling W/ Cabg, Mi Or Angioplasty Before 65f 55m? No      Service No     Blood Transfusions No     Caffeine Concern No     Occupational Exposure No     Hobby Hazards No     Sleep Concern No     Stress Concern No     Weight Concern No     Special Diet Yes     Back Care No     Exercise Yes     Bike Helmet No     Seat Belt Yes     Self-Exams Yes     Social History Narrative    Mother of 4, all starting school this yearWorks in  can bring her kids there too.  is a teacher , out of work now.         How much exercise per week? 3 days     How much calcium per day? 1 serving      How much caffeine per day? 3 cups    How much vitamin D per day?      Do you/your family wear seatbelts?  Yes    Do you/your family use safety helmets? na    Do you/your family use sunscreen?No    Do you/your family keep firearms in the home? No    Do you/your family have a smoke detector(s)? Yes        Do you feel safe in your home? Yes    Has anyone ever touched you in an unwanted manner?      Explain         June 11, 2014 Shaneka Abbasi LPN                   Vitals:    07/05/17 1405   BP: 109/77   BP Location: Left arm   Patient Position: Chair   Cuff Size: Adult Regular   Pulse: 66   Weight: 81.9 kg (180 lb 8 oz)   Height: 1.626 m (5' 4\")         Physical Exam   Constitutional: She is oriented to person, place, and time and well-developed, well-nourished, and in no distress.   HENT:   Head: " Normocephalic and atraumatic.   Right Ear: External ear normal.   Left Ear: External ear normal.   Nose: Nose normal.   Mouth/Throat: Oropharynx is clear and moist.   Eyes: Conjunctivae are normal. Pupils are equal, round, and reactive to light.   Neck: Normal range of motion. Neck supple. No thyromegaly present.   Cardiovascular: Normal rate, regular rhythm and normal heart sounds.    Pulmonary/Chest: Effort normal and breath sounds normal.   Abdominal: Soft. Bowel sounds are normal. She exhibits no distension and no mass. There is no tenderness. There is no rebound and no guarding.   Musculoskeletal: She exhibits no edema.   Lymphadenopathy:     She has no cervical adenopathy.   Neurological: She is alert and oriented to person, place, and time. She exhibits normal muscle tone. Coordination normal.   Skin: Skin is warm and dry.   Psychiatric: Mood, memory, affect and judgment normal.   Vitals reviewed.    Assessment and Plan:  Tanya was seen today for consult for.    Diagnoses and all orders for this visit:    Fatigue, unspecified type. Patient was advised on possible causes of fatigue. Recommend evaluation for common treatable causes, including thyroid disease as well as iron deficiency. Patient will be advised on test results accordingly. Additional testing may be indicated for persistent fatigue with negative initial evaluation.   -     HCG Quantitative Pregnancy  -     CBC with Platelets  -     Ferritin  -     Iron and Iron Binding Capacity  -     TSH with free T4 reflex  -     Comprehensive metabolic panel    Nausea. Suspect gastritis. Recommend H.pylori testing. Patient was also prescribed omeprazole, instructions on administration were given today. Will follow up in 1 month, consider upper endoscopy with worsening or unresolving symptoms. Patient is in agreement with the plan.   -     H Pylori antigen stool; Future  -     omeprazole (PRILOSEC) 40 MG capsule; Take 1 capsule (40 mg) by mouth daily Take 30-60  minutes before a meal.        Total time spent 45  minutes.  More than 50% of the time spent with Ms. Denny on counseling / coordinating her care    Amira Early MD

## 2017-07-05 NOTE — LETTER
7/10/2017         Tanya Denny   [address]  [place] [address]        Dear Ms. Denny:    Your results are consistent with iron deficiency. Helicobacter test is pending. Iron supplements are recommended based on your iron studies.   Pregnancy test was negative.     Results for orders placed or performed in visit on 07/05/17   HCG Quantitative Pregnancy   Result Value Ref Range    HCG Quantitative Serum <1 0 - 5 IU/L   CBC with Platelets   Result Value Ref Range    WBC 4.7 4.0 - 11.0 10e9/L    RBC Count 4.27 3.8 - 5.2 10e12/L    Hemoglobin 11.4 (L) 11.7 - 15.7 g/dL    Hematocrit 34.7 (L) 35.0 - 47.0 %    MCV 81 78 - 100 fl    MCH 26.7 26.5 - 33.0 pg    MCHC 32.9 31.5 - 36.5 g/dL    RDW 12.7 10.0 - 15.0 %    Platelet Count 224 150 - 450 10e9/L   Ferritin   Result Value Ref Range    Ferritin 11 (L) 12 - 150 ng/mL   Iron and Iron Binding Capacity   Result Value Ref Range    Iron 68 35 - 180 ug/dL    Iron Binding Cap 374 240 - 430 ug/dL    Iron Saturation Index 18 15 - 46 %   TSH with free T4 reflex   Result Value Ref Range    TSH 0.98 0.40 - 4.00 mU/L   Comprehensive metabolic panel   Result Value Ref Range    Sodium 143 133 - 144 mmol/L    Potassium 3.9 3.4 - 5.3 mmol/L    Chloride 110 (H) 94 - 109 mmol/L    Carbon Dioxide 23 20 - 32 mmol/L    Anion Gap 10 3 - 14 mmol/L    Glucose 88 70 - 99 mg/dL    Urea Nitrogen 12 7 - 30 mg/dL    Creatinine 0.72 0.52 - 1.04 mg/dL    GFR Estimate >90  Non  GFR Calc   >60 mL/min/1.7m2    GFR Estimate If Black >90   GFR Calc   >60 mL/min/1.7m2    Calcium 8.6 8.5 - 10.1 mg/dL    Bilirubin Total 0.4 0.2 - 1.3 mg/dL    Albumin 3.8 3.4 - 5.0 g/dL    Protein Total 7.5 6.8 - 8.8 g/dL    Alkaline Phosphatase 42 40 - 150 U/L    ALT 15 0 - 50 U/L    AST 15 0 - 45 U/L         Please note that test explanations are brief and do not reflect all diagnostic uses.  If you have any questions or concerns, please call the clinic at [phone].       Sincerely,      Alysia Rogel sent on behalf of  Amira Early MD

## 2017-07-18 ENCOUNTER — OFFICE VISIT (OUTPATIENT)
Dept: FAMILY MEDICINE | Facility: CLINIC | Age: 37
End: 2017-07-18
Payer: COMMERCIAL

## 2017-07-18 VITALS
DIASTOLIC BLOOD PRESSURE: 77 MMHG | HEART RATE: 72 BPM | BODY MASS INDEX: 31.34 KG/M2 | WEIGHT: 182.6 LBS | TEMPERATURE: 98 F | SYSTOLIC BLOOD PRESSURE: 109 MMHG | OXYGEN SATURATION: 99 %

## 2017-07-18 DIAGNOSIS — B37.31 CANDIDIASIS OF VAGINA: ICD-10-CM

## 2017-07-18 DIAGNOSIS — Z78.9 TRYING TO GET PREGNANT: ICD-10-CM

## 2017-07-18 DIAGNOSIS — N89.8 VAGINAL ITCHING: Primary | ICD-10-CM

## 2017-07-18 DIAGNOSIS — D50.9 IRON DEFICIENCY ANEMIA, UNSPECIFIED IRON DEFICIENCY ANEMIA TYPE: ICD-10-CM

## 2017-07-18 DIAGNOSIS — R30.0 DYSURIA: ICD-10-CM

## 2017-07-18 LAB
ALBUMIN UR-MCNC: NEGATIVE MG/DL
APPEARANCE UR: CLEAR
BILIRUB UR QL STRIP: NEGATIVE
COLOR UR AUTO: YELLOW
GLUCOSE UR STRIP-MCNC: NEGATIVE MG/DL
HGB UR QL STRIP: NEGATIVE
KETONES UR STRIP-MCNC: NEGATIVE MG/DL
LEUKOCYTE ESTERASE UR QL STRIP: NEGATIVE
MICRO REPORT STATUS: ABNORMAL
NITRATE UR QL: NEGATIVE
PH UR STRIP: 6 PH (ref 5–7)
SP GR UR STRIP: <=1.005 (ref 1–1.03)
SPECIMEN SOURCE: ABNORMAL
URN SPEC COLLECT METH UR: NORMAL
UROBILINOGEN UR STRIP-ACNC: 0.2 EU/DL (ref 0.2–1)
WET PREP SPEC: ABNORMAL

## 2017-07-18 PROCEDURE — 99214 OFFICE O/P EST MOD 30 MIN: CPT | Performed by: PHYSICIAN ASSISTANT

## 2017-07-18 PROCEDURE — 87210 SMEAR WET MOUNT SALINE/INK: CPT | Performed by: PHYSICIAN ASSISTANT

## 2017-07-18 PROCEDURE — 81003 URINALYSIS AUTO W/O SCOPE: CPT | Performed by: PHYSICIAN ASSISTANT

## 2017-07-18 RX ORDER — MULTIVIT,IRON,MINERALS/LUTEIN
1 TABLET ORAL DAILY
Qty: 90 TABLET | Refills: 3 | Status: SHIPPED | OUTPATIENT
Start: 2017-07-18 | End: 2017-08-02

## 2017-07-18 RX ORDER — FERROUS SULFATE 325(65) MG
325 TABLET ORAL
Qty: 90 TABLET | Refills: 2 | Status: SHIPPED | OUTPATIENT
Start: 2017-07-18 | End: 2017-08-23

## 2017-07-18 RX ORDER — FLUCONAZOLE 150 MG/1
150 TABLET ORAL ONCE
Qty: 1 TABLET | Refills: 0 | Status: SHIPPED | OUTPATIENT
Start: 2017-07-18 | End: 2017-07-18

## 2017-07-18 NOTE — PATIENT INSTRUCTIONS
Based on your medical history and these are the current health maintenance or preventive care services that you are due for (some may have been done at this visit)  Health Maintenance Due   Topic Date Due     JACK QUESTIONNAIRE 1 YEAR  1980     URINE DRUG SCREEN Q1 YR  01/01/1996     MIGRAINE ACTION PLAN  01/01/1999     EYE EXAM Q1 YEAR  04/12/2017     PHQ-9 Q1YR  05/06/2017       At [facility], we strive to deliver an exceptional experience to you, every time we see you.    If you receive a survey in the mail, please send us back your thoughts. We really do value your feedback.    Your care team's suggested websites for health information:  Www.[email or web address] : Up to date and easily searchable information on multiple topics.  Www.medlineplus.gov : medication info, interactive tutorials, watch real surgeries online  Www.familydoctor.org : good info from the Academy of Family Physicians  Www.cdc.gov : public health info, travel advisories, epidemics (H1N1)  Www.aap.org : children's health info, normal development, vaccinations  Www.health.[facility].[place].us : [place] dept of health, public health issues in [place], N1N1    How to contact your care team:   Urgent Care/Same Day [phone]         Pharmacy [phone]      Clinic hours  M-Th 7 am-7 pm   Fri 7 am-5 pm.   Urgent care M-F 11 am-9 pm,   Sat/Sun 9 am-5 pm.  Pharmacy M-Th 8 am-8 pm Fri 8 am-6 pm  Sat/Sun 9 am-5 pm.     All password changes, disabled accounts, or ID changes in [employer]/MyHealth will be done by our Access Services Department.    If you need help with your account or password, call: [phone]. Clinic staff no longer has the ability to change passwords.     Vaginal Infection: Yeast (Candidiasis)  Yeast infection occurs when yeast in the vagina increase and start attacking the vaginal tissues. Yeast is a type of fungus. These infections are often caused by a type of yeast called Candida albicans. Other species of yeast can also  cause infections. Factors that may make infection more likely include recent antibiotic use, douching, or increased sex. Yeast infections are more common in women who have diabetes, or are obese or pregnant, or have a weak immune system.  Symptoms of yeast infection    Clumpy or thin, white discharge, which may look like cottage cheese    No odor or minimal odor    Severe vaginal itching or burning    Burning with urination    Swelling, redness of vulva    Pain during sex  Treating yeast infection  Yeast infection is treated with a vaginal antifungal cream. In some cases, antifungal pills are prescribed instead. During treatment:    Finish all of your medicine, even if your symptoms go away.    Apply the cream before going to bed. Lie flat after applying so that it doesn't drip out.    Do not douche or use tampons.    Don't rely on a diaphragm or condoms, since the cream may weaken them.    Avoid intercourse if advised by your healthcare provider.     Should I treat a yeast infection myself?  Discuss with your healthcare provider whether you should use over-the-counter medicines to treat a yeast infection. Self-treatment may depend on whether:    You've had a yeast infection in the past.    You're at risk for STDs.  Call your healthcare provider if symptoms do not go away or come back after treatment.   Date Last Reviewed: 5/12/2015 2000-2017 [employer]. [address] [place] [address], [place] [address]. All rights reserved. This information is not intended as a substitute for professional medical care. Always follow your healthcare professional's instructions.

## 2017-07-18 NOTE — NURSING NOTE
"Chief Complaint   Patient presents with     Urinary Problem     about a week        Initial /77 (BP Location: Left arm, Patient Position: Chair, Cuff Size: Adult Regular)  Pulse 72  Temp 98  F (36.7  C) (Oral)  Wt 182 lb 9.6 oz (82.8 kg)  LMP 07/08/2017  SpO2 99%  Breastfeeding? No  BMI 31.34 kg/m2 Estimated body mass index is 31.34 kg/(m^2) as calculated from the following:    Height as of 7/5/17: 5' 4\" (1.626 m).    Weight as of this encounter: 182 lb 9.6 oz (82.8 kg).  Medication Reconciliation: complete   Ag Escamilla MA        "

## 2017-07-18 NOTE — PROGRESS NOTES
SUBJECTIVE:                                                    Tanya Denny is a 37 year old female who presents to clinic today for the following health issues:      URINARY TRACT SYMPTOMS  Onset: a week    Description:   Painful urination (Dysuria): YES  Blood in urine (Hematuria): no   Delay in urine (Hesitency): no     Intensity: 10/10    Progression of Symptoms:  worsening    Accompanying Signs & Symptoms:  Fever/chills: no   Flank pain no   Nausea and vomiting: no   Any vaginal symptoms: none and vaginal itching  Abdominal/Pelvic Pain: YES, yesterday    History:   History of frequent UTI's: no  History of kidney stones: no   Sexually Active: YES  Possibility of pregnancy: No    Precipitating factors:   none    Therapies Tried and outcome: OTC med  Is trying to ge tpreg, req prenatal vitamins and refill of iron pills          No Known Allergies    Past Medical History:   Diagnosis Date     SAB (spontaneous )     1ST TRIMESTER         Current Outpatient Prescriptions on File Prior to Visit:  omeprazole (PRILOSEC) 40 MG capsule Take 1 capsule (40 mg) by mouth daily Take 30-60 minutes before a meal.   cyclobenzaprine (FLEXERIL) 10 MG tablet Take 1 tablet (10 mg) by mouth 3 times daily as needed for muscle spasms (Patient not taking: Reported on 2017)   ibuprofen (ADVIL/MOTRIN) 200 MG tablet Take 3 tablets (600 mg) by mouth every 6 hours as needed for mild pain (Patient not taking: Reported on 2017)   SUMAtriptan (IMITREX) 50 MG tablet Take 1 tablet (50 mg) by mouth at onset of headache for migraine (Patient not taking: Reported on 2017)   fluconazole (DIFLUCAN) 150 MG tablet Take 1 tablet (150 mg) by mouth every 3 days (Patient not taking: Reported on 2017)     No current facility-administered medications on file prior to visit.     Social History   Substance Use Topics     Smoking status: Never Smoker     Smokeless tobacco: Never Used     Alcohol use No        ROS:  General: negative for fever  GU_ as above  GYn + itching  ABD: Negative for abd pain.       OBJECTIVE:  /77 (BP Location: Left arm, Patient Position: Chair, Cuff Size: Adult Regular)  Pulse 72  Temp 98  F (36.7  C) (Oral)  Wt 182 lb 9.6 oz (82.8 kg)  LMP 07/08/2017  SpO2 99%  Breastfeeding? No  BMI 31.34 kg/m2   General:   awake, alert, and cooperative.  NAD.   Head: Normocephalic, atraumatic.  Eyes: Conjunctiva clear,   RESP- breathing rate normal  Neuro: Alert and oriented - normal speech.    UA WNL  Wet prep + yeast    ASSESSMENT:well appearing    ICD-10-CM    1. Vaginal itching L29.8 Wet prep   2. Dysuria R30.0 *UA reflex to Microscopic and Culture ([place] [facility] [place] [facility] (except [place] and [place])   3. Iron deficiency anemia, unspecified iron deficiency anemia type D50.9 ferrous sulfate (IRON) 325 (65 FE) MG tablet   4. Candidiasis of vagina B37.3 fluconazole (DIFLUCAN) 150 MG tablet   5. Trying to get pregnant Z78.9 Prenatal Vit-Fe Fumarate-FA (MULTI PRENATAL) 27-0.8 MG TABS       PLAN:   Follow up:  prn  Advised about symptoms which might herald more serious problems.

## 2017-07-18 NOTE — MR AVS SNAPSHOT
After Visit Summary   7/18/2017    Tanya Denny    MRN: [id number]           Patient Information     Date Of Birth          1980        Visit Information        Provider Department      7/18/2017 4:20 PM Brad Retana PA [facility]        Today's Diagnoses     Vaginal itching    -  1    Dysuria        Iron deficiency anemia, unspecified iron deficiency anemia type        Candidiasis of vagina        Trying to get pregnant          Care Instructions    Based on your medical history and these are the current health maintenance or preventive care services that you are due for (some may have been done at this visit)  Health Maintenance Due   Topic Date Due     JACK QUESTIONNAIRE 1 YEAR  1980     URINE DRUG SCREEN Q1 YR  01/01/1996     MIGRAINE ACTION PLAN  01/01/1999     EYE EXAM Q1 YEAR  04/12/2017     PHQ-9 Q1YR  05/06/2017       At [facility], we strive to deliver an exceptional experience to you, every time we see you.    If you receive a survey in the mail, please send us back your thoughts. We really do value your feedback.    Your care team's suggested websites for health information:  Www.[employer].org : Up to date and easily searchable information on multiple topics.  Www.medlineplus.gov : medication info, interactive tutorials, watch real surgeries online  Www.familydoctor.org : good info from the Academy of Family Physicians  Www.cdc.gov : public health info, travel advisories, epidemics (H1N1)  Www.aap.org : children's health info, normal development, vaccinations  Www.health.[facility].[place].us : [place] dept of health, public health issues in [place], N1N1    How to contact your care team:   Urgent Care/Same Day [phone]         Pharmacy [phone]      Clinic hours  M-Th 7 am-7 pm   Fri 7 am-5 pm.   Urgent care M-F 11 am-9 pm,   Sat/Sun 9 am-5 pm.  Pharmacy M-Th 8 am-8 pm Fri 8 am-6 pm  Sat/Sun 9 am-5 pm.     All password  changes, disabled accounts, or ID changes in [employer]/MyHealth will be done by our Access Services Department.    If you need help with your account or password, call: [phone]. Clinic staff no longer has the ability to change passwords.     Vaginal Infection: Yeast (Candidiasis)  Yeast infection occurs when yeast in the vagina increase and start attacking the vaginal tissues. Yeast is a type of fungus. These infections are often caused by a type of yeast called Candida albicans. Other species of yeast can also cause infections. Factors that may make infection more likely include recent antibiotic use, douching, or increased sex. Yeast infections are more common in women who have diabetes, or are obese or pregnant, or have a weak immune system.  Symptoms of yeast infection    Clumpy or thin, white discharge, which may look like cottage cheese    No odor or minimal odor    Severe vaginal itching or burning    Burning with urination    Swelling, redness of vulva    Pain during sex  Treating yeast infection  Yeast infection is treated with a vaginal antifungal cream. In some cases, antifungal pills are prescribed instead. During treatment:    Finish all of your medicine, even if your symptoms go away.    Apply the cream before going to bed. Lie flat after applying so that it doesn't drip out.    Do not douche or use tampons.    Don't rely on a diaphragm or condoms, since the cream may weaken them.    Avoid intercourse if advised by your healthcare provider.     Should I treat a yeast infection myself?  Discuss with your healthcare provider whether you should use over-the-counter medicines to treat a yeast infection. Self-treatment may depend on whether:    You've had a yeast infection in the past.    You're at risk for STDs.  Call your healthcare provider if symptoms do not go away or come back after treatment.   Date Last Reviewed: 5/12/2015 2000-2017 [employer]. [address] [place] [address],  [place] [address]. All rights reserved. This information is not intended as a substitute for professional medical care. Always follow your healthcare professional's instructions.                Follow-ups after your visit        Follow-up notes from your care team     Return if symptoms worsen or fail to improve.      Your next 10 appointments already scheduled     Aug 02, 2017  2:20 PM CDT   Return Visit with Amira Early MD   [facility]  ([facility])    [address],[facility] [address]  [address] [address] [phone]              Who to contact     If you have questions or need follow up information about today's clinic visit or your schedule please contact [facility] directly at [phone].  Normal or non-critical lab and imaging results will be communicated to you by MyChart, letter or phone within 4 business days after the clinic has received the results. If you do not hear from us within 7 days, please contact the clinic through MyChart or phone. If you have a critical or abnormal lab result, we will notify you by phone as soon as possible.  Submit refill requests through [employer] or call your pharmacy and they will forward the refill request to us. Please allow 3 business days for your refill to be completed.          Additional Information About Your Visit        MyChart Information     [employer] gives you secure access to your electronic health record. If you see a primary care provider, you can also send messages to your care team and make appointments. If you have questions, please call your primary care clinic.  If you do not have a primary care provider, please call [phone] and they will assist you.        Care EveryWhere ID     This is your Care EveryWhere ID. This could be used by other organizations to access your [facility] medical records  [id number]        Your Vitals Were     Pulse Temperature Last  Period Pulse Oximetry Breastfeeding? BMI (Body Mass Index)    72 98  F (36.7  C) (Oral) 07/08/2017 99% No 31.34 kg/m2       Blood Pressure from Last 3 Encounters:   07/18/17 109/77   07/05/17 109/77   04/19/17 115/77    Weight from Last 3 Encounters:   07/18/17 182 lb 9.6 oz (82.8 kg)   07/05/17 180 lb 8 oz (81.9 kg)   04/19/17 186 lb 3.2 oz (84.5 kg)              We Performed the Following     *UA reflex to Microscopic and Culture ([place] [facility] [place] [facility] (except [place] and [place])     Wet prep          Today's Medication Changes          These changes are accurate as of: 7/18/17  4:52 PM.  If you have any questions, ask your nurse or doctor.               These medicines have changed or have updated prescriptions.        Dose/Directions    ferrous sulfate 325 (65 FE) MG tablet   Commonly known as:  IRON   This may have changed:    - when to take this  - additional instructions   Used for:  Iron deficiency anemia, unspecified iron deficiency anemia type   Changed by:  Brad Retana PA        Dose:  325 mg   Take 1 tablet (325 mg) by mouth daily (with breakfast)   Quantity:  90 tablet   Refills:  2       * fluconazole 150 MG tablet   Commonly known as:  DIFLUCAN   This may have changed:  Another medication with the same name was added. Make sure you understand how and when to take each.   Used for:  Pruritus of vulva   Changed by:  Amira Early MD        Dose:  150 mg   Take 1 tablet (150 mg) by mouth every 3 days   Quantity:  1 tablet   Refills:  1       * fluconazole 150 MG tablet   Commonly known as:  DIFLUCAN   This may have changed:  You were already taking a medication with the same name, and this prescription was added. Make sure you understand how and when to take each.   Used for:  Candidiasis of vagina   Changed by:  Brad Retana PA        Dose:  150 mg   Take 1 tablet (150 mg) by mouth once for 1 dose   Quantity:  1 tablet   Refills:  0       * Notice:  This  list has 2 medication(s) that are the same as other medications prescribed for you. Read the directions carefully, and ask your doctor or other care provider to review them with you.         Where to get your medicines      These medications were sent to [facility] - [place], [place] - [address]  [address], [place] [address]     Phone:  [phone]     ferrous sulfate 325 (65 FE) MG tablet    fluconazole 150 MG tablet    MULTI PRENATAL 27-0.8 MG Tabs                Primary Care Provider    Pcp Unknown Verified       No address on file        Equal Access to Services     [facility]: Hadii aad ku hadasho Soomaali, waaxda luqadaha, qaybta kaalmada adetrue, sherri diaz . So [facility] [phone].    ATENCIÓN: Si habla español, tiene a bautista disposición servicios gratuitos de asistencia lingüística. [facility] [phone].    We comply with applicable federal civil rights laws and [place] laws. We do not discriminate on the basis of race, color, national origin, age, disability sex, sexual orientation or gender identity.            Thank you!     Thank you for choosing [facility]  for your care. Our goal is always to provide you with excellent care. Hearing back from our patients is one way we can continue to improve our services. Please take a few minutes to complete the written survey that you may receive in the mail after your visit with us. Thank you!             Your Updated Medication List - Protect others around you: Learn how to safely use, store and throw away your medicines at www.disposemymeds.org.          This list is accurate as of: 7/18/17  4:52 PM.  Always use your most recent med list.                   Brand Name Dispense Instructions for use Diagnosis    cyclobenzaprine 10 MG tablet    FLEXERIL    30 tablet    Take 1 tablet (10 mg) by mouth 3 times daily as needed for muscle spasms    Episodic tension-type  headache, not intractable       ferrous sulfate 325 (65 FE) MG tablet    IRON    90 tablet    Take 1 tablet (325 mg) by mouth daily (with breakfast)    Iron deficiency anemia, unspecified iron deficiency anemia type       * fluconazole 150 MG tablet    DIFLUCAN    1 tablet    Take 1 tablet (150 mg) by mouth every 3 days    Pruritus of vulva       * fluconazole 150 MG tablet    DIFLUCAN    1 tablet    Take 1 tablet (150 mg) by mouth once for 1 dose    Candidiasis of vagina       ibuprofen 200 MG tablet    ADVIL/MOTRIN    60 tablet    Take 3 tablets (600 mg) by mouth every 6 hours as needed for mild pain    Other chronic pain       MULTI PRENATAL 27-0.8 MG Tabs     90 tablet    Take 1 tablet by mouth daily    Trying to get pregnant       omeprazole 40 MG capsule    priLOSEC    30 capsule    Take 1 capsule (40 mg) by mouth daily Take 30-60 minutes before a meal.    Nausea       SUMAtriptan 50 MG tablet    IMITREX    10 tablet    Take 1 tablet (50 mg) by mouth at onset of headache for migraine    Migraine without aura and without status migrainosus, not intractable       * Notice:  This list has 2 medication(s) that are the same as other medications prescribed for you. Read the directions carefully, and ask your doctor or other care provider to review them with you.

## 2017-07-25 RX ORDER — PNV NO.95/FERROUS FUM/FOLIC AC 28MG-0.8MG
TABLET ORAL
Qty: 90 TABLET | Refills: 1 | OUTPATIENT
Start: 2017-07-25

## 2017-08-02 ENCOUNTER — OFFICE VISIT (OUTPATIENT)
Dept: INTERNAL MEDICINE | Facility: CLINIC | Age: 37
End: 2017-08-02
Attending: INTERNAL MEDICINE
Payer: COMMERCIAL

## 2017-08-02 VITALS
WEIGHT: 186.2 LBS | HEART RATE: 75 BPM | DIASTOLIC BLOOD PRESSURE: 70 MMHG | SYSTOLIC BLOOD PRESSURE: 105 MMHG | BODY MASS INDEX: 31.96 KG/M2

## 2017-08-02 DIAGNOSIS — K29.50 CHRONIC GASTRITIS WITHOUT BLEEDING, UNSPECIFIED GASTRITIS TYPE: Primary | ICD-10-CM

## 2017-08-02 DIAGNOSIS — Z78.9 TRYING TO GET PREGNANT: ICD-10-CM

## 2017-08-02 DIAGNOSIS — R11.0 NAUSEA: ICD-10-CM

## 2017-08-02 PROCEDURE — 99212 OFFICE O/P EST SF 10 MIN: CPT | Mod: ZF

## 2017-08-02 PROCEDURE — 87338 HPYLORI STOOL AG IA: CPT | Performed by: INTERNAL MEDICINE

## 2017-08-02 RX ORDER — MULTIVIT,IRON,MINERALS/LUTEIN
1 TABLET ORAL DAILY
Qty: 90 TABLET | Refills: 3 | Status: SHIPPED | OUTPATIENT
Start: 2017-08-02 | End: 2017-08-23

## 2017-08-02 NOTE — MR AVS SNAPSHOT
After Visit Summary   8/2/2017    Tanya Denny    MRN: [id number]           Patient Information     Date Of Birth          1980        Visit Information        Provider Department      8/2/2017 2:20 PM Amira Early MD [facility]         Today's Diagnoses     Chronic gastritis without bleeding, unspecified gastritis type    -  1    Trying to get pregnant           Follow-ups after your visit        Future tests that were ordered for you today     Open Future Orders        Priority Expected Expires Ordered    H Pylori antigen stool Routine  9/3/2017 8/4/2017            Who to contact     Please call your clinic at [phone] to:    Ask questions about your health    Make or cancel appointments    Discuss your medicines    Learn about your test results    Speak to your doctor   If you have compliments or concerns about an experience at your clinic, or if you wish to file a complaint, please contact [facility] Patient Relations at [phone] or email us at [email or web address]         Additional Information About Your Visit        MyChart Information     [email or web address] gives you secure access to your electronic health record. If you see a primary care provider, you can also send messages to your care team and make appointments. If you have questions, please call your primary care clinic.  If you do not have a primary care provider, please call [phone] and they will assist you.      Kosmix is an electronic gateway that provides easy, online access to your medical records. With Kosmix, you can request a clinic appointment, read your test results, renew a prescription or communicate with your care team.     To access your existing account, please contact your [facility] or call [phone] for assistance.        Care EveryWhere ID     This is your Care EveryWhere ID. This could be used by  other organizations to access your [facility] medical records  [id number]        Your Vitals Were     Pulse Last Period Breastfeeding? BMI (Body Mass Index)          75 07/08/2017 No 31.96 kg/m2         Blood Pressure from Last 3 Encounters:   08/02/17 105/70   07/18/17 109/77   07/05/17 109/77    Weight from Last 3 Encounters:   08/02/17 84.5 kg (186 lb 3.2 oz)   07/18/17 82.8 kg (182 lb 9.6 oz)   07/05/17 81.9 kg (180 lb 8 oz)                 Today's Medication Changes          These changes are accurate as of: 8/2/17 11:59 PM.  If you have any questions, ask your nurse or doctor.               Stop taking these medicines if you haven't already. Please contact your care team if you have questions.     cyclobenzaprine 10 MG tablet   Commonly known as:  FLEXERIL   Stopped by:  Amira Early MD           fluconazole 150 MG tablet   Commonly known as:  DIFLUCAN   Stopped by:  Amira Early MD           ibuprofen 200 MG tablet   Commonly known as:  ADVIL/MOTRIN   Stopped by:  Amira Early MD           SUMAtriptan 50 MG tablet   Commonly known as:  IMITREX   Stopped by:  Amira Early MD                Where to get your medicines      These medications were sent to [facility]/pharmacy #[id number] - [place], [place] - [address]  [address] [address]     Phone:  [phone]     MULTI PRENATAL 27-0.8 MG Tabs                Primary Care Provider    Pcp Unknown Verified       No address on file        Equal Access to Services     FRANCISCO STAUFFER : Hadabhinav perez Soralph, waaxda luqadaha, qaybta kaalmada charley, waxay idiin hayaan adeparveen jenkins. So [facility] [phone].    ATENCIÓN: Si habla español, tiene a bautista disposición servicios gratuitos de asistencia lingüística. Llame al [phone].    We comply with applicable federal civil rights laws and [place] laws. We do not discriminate on the basis of race, color, national origin, age, disability sex, sexual  orientation or gender identity.            Thank you!     Thank you for choosing [facility]   for your care. Our goal is always to provide you with excellent care. Hearing back from our patients is one way we can continue to improve our services. Please take a few minutes to complete the written survey that you may receive in the mail after your visit with us. Thank you!             Your Updated Medication List - Protect others around you: Learn how to safely use, store and throw away your medicines at www.disposemymeds.org.          This list is accurate as of: 8/2/17 11:59 PM.  Always use your most recent med list.                   Brand Name Dispense Instructions for use Diagnosis    ferrous sulfate 325 (65 FE) MG tablet    IRON    90 tablet    Take 1 tablet (325 mg) by mouth daily (with breakfast)    Iron deficiency anemia, unspecified iron deficiency anemia type       MULTI PRENATAL 27-0.8 MG Tabs     90 tablet    Take 1 tablet by mouth daily    Trying to get pregnant

## 2017-08-02 NOTE — NURSING NOTE
Chief Complaint   Patient presents with     RECHECK     1 month follow-up C/O cont fatigue ,dizziness and no appettite   Alysia Rogel LPN

## 2017-08-02 NOTE — PROGRESS NOTES
HPI  Patient is here for follow up. She reports that she had no improvement in her heartburn and nausea.     Review of Systems     Constitutional:  Positive for decreased appetite. Negative for fever, chills, fatigue and recent stressors.   Respiratory:   Negative for cough and dyspnea on exertion.    Cardiovascular:  Negative for chest pain, dyspnea on exertion and edema.   Gastrointestinal:  Positive for heartburn, nausea and abdominal pain. Negative for vomiting, diarrhea, constipation, melena, jaundice, rectal bleeding, coffee ground emesis and change in stool.   Musculoskeletal:  Negative for back pain.   Skin:  Negative for itching and rash.   Endo/Heme:  Negative for anemia, swollen glands and bruises/bleeds easily.     Current Outpatient Prescriptions   Medication     ferrous sulfate (IRON) 325 (65 FE) MG tablet     Prenatal Vit-Fe Fumarate-FA (MULTI PRENATAL) 27-0.8 MG TABS     omeprazole (PRILOSEC) 40 MG capsule     No current facility-administered medications for this visit.      Vitals:    08/02/17 1445 08/02/17 1447 08/02/17 1448   BP: 99/62 102/65 105/70   Pulse: 78 77 75   Weight: 84.5 kg (186 lb 3.2 oz)           Physical Exam   Constitutional: She is oriented to person, place, and time and well-developed, well-nourished, and in no distress.   HENT:   Head: Normocephalic and atraumatic.   Eyes: Conjunctivae are normal.   Neck: Normal range of motion.   Musculoskeletal: She exhibits no edema.   Neurological: She is alert and oriented to person, place, and time.   Skin: Skin is warm and dry.   Psychiatric: Mood, memory, affect and judgment normal.   Vitals reviewed.    Assessment and Plan:  Tanya was seen today for recheck.    Diagnoses and all orders for this visit:    Chronic gastritis without bleeding, unspecified gastritis type. Discussed possible causes of abdominal pain. Recommend testing for Helicobacter pylori. If the test is negative, will proceed with upper endoscopy. Patient will be advised  on test results accordingly.   -     UPPER GI ENDOSCOPY; Future  -     H Pylori antigen stool; Future    Trying to get pregnant  -     Prenatal Vit-Fe Fumarate-FA (MULTI PRENATAL) 27-0.8 MG TABS; Take 1 tablet by mouth daily    Total time spent 15 minutes.  More than 50% of the time spent with Ms. Denny on counseling / coordinating her care    Amira Early MD

## 2017-08-02 NOTE — LETTER
8/2/2017       RE: Tanya Denny  [address]  [place] [address]     Dear Colleague,    Thank you for referring your patient, Tanya Denny, to the [facility]  at [facility]. Please see a copy of my visit note below.    HPI  Patient is here for follow up. She reports that she had no improvement in her heartburn and nausea.     Review of Systems   Constitutional:  Positive for decreased appetite. Negative for fever, chills, fatigue and recent stressors.   Respiratory:   Negative for cough and dyspnea on exertion.    Cardiovascular:  Negative for chest pain, dyspnea on exertion and edema.   Gastrointestinal:  Positive for heartburn, nausea and abdominal pain. Negative for vomiting, diarrhea, constipation, melena, jaundice, rectal bleeding, coffee ground emesis and change in stool.   Musculoskeletal:  Negative for back pain.   Skin:  Negative for itching and rash.   Endo/Heme:  Negative for anemia, swollen glands and bruises/bleeds easily.     Current Outpatient Prescriptions   Medication     ferrous sulfate (IRON) 325 (65 FE) MG tablet     Prenatal Vit-Fe Fumarate-FA (MULTI PRENATAL) 27-0.8 MG TABS     omeprazole (PRILOSEC) 40 MG capsule     No current facility-administered medications for this visit.      Vitals:    08/02/17 1445 08/02/17 1447 08/02/17 1448   BP: 99/62 102/65 105/70   Pulse: 78 77 75   Weight: 84.5 kg (186 lb 3.2 oz)           Physical Exam   Constitutional: She is oriented to person, place, and time and well-developed, well-nourished, and in no distress.   HENT:   Head: Normocephalic and atraumatic.   Eyes: Conjunctivae are normal.   Neck: Normal range of motion.   Musculoskeletal: She exhibits no edema.   Neurological: She is alert and oriented to person, place, and time.   Skin: Skin is warm and dry.   Psychiatric: Mood, memory, affect and judgment normal.   Vitals reviewed.    Assessment and Plan:  Tanya  was seen today for recheck.    Diagnoses and all orders for this visit:    Chronic gastritis without bleeding, unspecified gastritis type. Discussed possible causes of abdominal pain. Recommend testing for Helicobacter pylori. If the test is negative, will proceed with upper endoscopy. Patient will be advised on test results accordingly.   -     UPPER GI ENDOSCOPY; Future  -     H Pylori antigen stool; Future    Trying to get pregnant  -     Prenatal Vit-Fe Fumarate-FA (MULTI PRENATAL) 27-0.8 MG TABS; Take 1 tablet by mouth daily    Total time spent 15 minutes.  More than 50% of the time spent with Ms. Denny on counseling / coordinating her care    Amira Early MD

## 2017-08-03 LAB
H PYLORI AG STL QL IA: ABNORMAL
MICRO REPORT STATUS: ABNORMAL
SPECIMEN SOURCE: ABNORMAL

## 2017-08-04 ENCOUNTER — TELEPHONE (OUTPATIENT)
Dept: INTERNAL MEDICINE | Facility: CLINIC | Age: 37
End: 2017-08-04

## 2017-08-04 DIAGNOSIS — K29.70 HELICOBACTER POSITIVE GASTRITIS: Primary | ICD-10-CM

## 2017-08-04 DIAGNOSIS — B96.81 HELICOBACTER POSITIVE GASTRITIS: Primary | ICD-10-CM

## 2017-08-04 DIAGNOSIS — R11.0 NAUSEA: ICD-10-CM

## 2017-08-04 RX ORDER — OMEPRAZOLE 40 MG/1
40 CAPSULE, DELAYED RELEASE ORAL DAILY
Qty: 14 CAPSULE | Refills: 0 | Status: SHIPPED | OUTPATIENT
Start: 2017-08-04 | End: 2017-08-23

## 2017-08-04 RX ORDER — AMOXICILLIN 500 MG/1
1000 CAPSULE ORAL 2 TIMES DAILY
Qty: 56 CAPSULE | Refills: 0 | Status: SHIPPED | OUTPATIENT
Start: 2017-08-04 | End: 2017-08-18

## 2017-08-04 RX ORDER — CLARITHROMYCIN 500 MG
500 TABLET ORAL 2 TIMES DAILY
Qty: 28 TABLET | Refills: 0 | Status: SHIPPED | OUTPATIENT
Start: 2017-08-04 | End: 2017-08-30

## 2017-08-04 NOTE — TELEPHONE ENCOUNTER
Discussed test result with patient. Recommend triple therapy for eradication of Helicobacter pylori. Patient was advised on antibiotics and repeat test in 1 month after completion of treatment. Patient is in agreement with the plan.  Amira Early MD

## 2017-08-05 ASSESSMENT — ENCOUNTER SYMPTOMS
RECTAL BLEEDING: 0
DYSPNEA ON EXERTION: 0
NAUSEA: 1
FEVER: 0
FATIGUE: 0
BRUISES/BLEEDS EASILY: 0
CHILLS: 0
DIARRHEA: 0
COUGH: 0
ABDOMINAL PAIN: 1
JAUNDICE: 0
HEARTBURN: 1
DECREASED APPETITE: 1
CONSTIPATION: 0
VOMITING: 0
SWOLLEN GLANDS: 0
BACK PAIN: 0

## 2017-08-07 ENCOUNTER — MYC MEDICAL ADVICE (OUTPATIENT)
Dept: INTERNAL MEDICINE | Facility: CLINIC | Age: 37
End: 2017-08-07

## 2017-08-07 DIAGNOSIS — O20.9 FIRST TRIMESTER BLEEDING: Primary | ICD-10-CM

## 2017-08-08 ENCOUNTER — TELEPHONE (OUTPATIENT)
Dept: OBGYN | Facility: CLINIC | Age: 37
End: 2017-08-08

## 2017-08-08 DIAGNOSIS — R11.0 NAUSEA: ICD-10-CM

## 2017-08-08 DIAGNOSIS — B96.81 HELICOBACTER POSITIVE GASTRITIS: ICD-10-CM

## 2017-08-08 DIAGNOSIS — K29.70 HELICOBACTER POSITIVE GASTRITIS: ICD-10-CM

## 2017-08-08 RX ORDER — AMOXICILLIN 400 MG/5ML
POWDER, FOR SUSPENSION ORAL
Qty: 100 ML | Refills: 0 | Status: SHIPPED | OUTPATIENT
Start: 2017-08-08 | End: 2017-08-20

## 2017-08-08 NOTE — TELEPHONE ENCOUNTER
Per hattie Martínez requesting her prescribed medication be changed from capsules d/t containing pork. Spoke to pharmacist at [facility] in [place] and Prilosec 40 mg capsules changed to 20 mg tablets and amoxicillin changed to elixir suspension.

## 2017-08-20 ENCOUNTER — OFFICE VISIT (OUTPATIENT)
Dept: URGENT CARE | Facility: URGENT CARE | Age: 37
End: 2017-08-20
Payer: COMMERCIAL

## 2017-08-20 VITALS
OXYGEN SATURATION: 100 % | DIASTOLIC BLOOD PRESSURE: 66 MMHG | BODY MASS INDEX: 31.76 KG/M2 | TEMPERATURE: 98.7 F | WEIGHT: 185 LBS | SYSTOLIC BLOOD PRESSURE: 101 MMHG

## 2017-08-20 DIAGNOSIS — Z32.01 POSITIVE URINE PREGNANCY TEST: ICD-10-CM

## 2017-08-20 DIAGNOSIS — B37.31 CANDIDIASIS OF VULVA AND VAGINA: ICD-10-CM

## 2017-08-20 DIAGNOSIS — N89.8 VAGINAL DISCHARGE: Primary | ICD-10-CM

## 2017-08-20 DIAGNOSIS — R39.89 URINARY PROBLEM: ICD-10-CM

## 2017-08-20 LAB
ALBUMIN UR-MCNC: NEGATIVE MG/DL
APPEARANCE UR: CLEAR
BETA HCG QUAL IFA URINE: POSITIVE
BILIRUB UR QL STRIP: NEGATIVE
COLOR UR AUTO: YELLOW
GLUCOSE UR STRIP-MCNC: NEGATIVE MG/DL
HGB UR QL STRIP: NEGATIVE
KETONES UR STRIP-MCNC: NEGATIVE MG/DL
LEUKOCYTE ESTERASE UR QL STRIP: NEGATIVE
NITRATE UR QL: NEGATIVE
PH UR STRIP: 7.5 PH (ref 5–7)
SOURCE: ABNORMAL
SP GR UR STRIP: 1.01 (ref 1–1.03)
SPECIMEN SOURCE: ABNORMAL
UROBILINOGEN UR STRIP-ACNC: 0.2 EU/DL (ref 0.2–1)
WET PREP SPEC: ABNORMAL

## 2017-08-20 PROCEDURE — 81003 URINALYSIS AUTO W/O SCOPE: CPT | Performed by: PHYSICIAN ASSISTANT

## 2017-08-20 PROCEDURE — 84703 CHORIONIC GONADOTROPIN ASSAY: CPT | Performed by: PHYSICIAN ASSISTANT

## 2017-08-20 PROCEDURE — 99213 OFFICE O/P EST LOW 20 MIN: CPT | Performed by: PHYSICIAN ASSISTANT

## 2017-08-20 PROCEDURE — 87210 SMEAR WET MOUNT SALINE/INK: CPT | Performed by: PHYSICIAN ASSISTANT

## 2017-08-20 RX ORDER — CLOTRIMAZOLE 1 %
1 CREAM WITH APPLICATOR VAGINAL DAILY
Qty: 45 G | Refills: 0 | Status: SHIPPED | OUTPATIENT
Start: 2017-08-20 | End: 2017-08-27

## 2017-08-20 NOTE — PROGRESS NOTES
SUBJECTIVE:   Tanya Denny is a 37 year old female who presents to clinic today for the following health issues:      Vaginal Symptoms      Duration: 1 day    Description  itching and burning    Intensity:  mild    Accompanying signs and symptoms (fever/dysuria/abdominal or back pain): None    History  Sexually active: yes, single partner, contraception not required  Possibility of pregnancy: Yes  Recent antibiotic use: no     Precipitating or alleviating factors: None    Therapies tried and outcome: Monistat   Outcome: no improvement     LMP -, shorter than normal. Trying to get pg.       No Known Allergies    Past Medical History:   Diagnosis Date     SAB (spontaneous )     1ST TRIMESTER         Current Outpatient Prescriptions on File Prior to Visit:  omeprazole (PRILOSEC) 40 MG capsule Take 1 capsule (40 mg) by mouth daily Take 30-60 minutes before a meal.   Prenatal Vit-Fe Fumarate-FA (MULTI PRENATAL) 27-0.8 MG TABS Take 1 tablet by mouth daily   ferrous sulfate (IRON) 325 (65 FE) MG tablet Take 1 tablet (325 mg) by mouth daily (with breakfast)   clarithromycin (BIAXIN) 500 MG tablet Take 1 tablet (500 mg) by mouth 2 times daily (Patient not taking: Reported on 2017)     No current facility-administered medications on file prior to visit.     Social History   Substance Use Topics     Smoking status: Never Smoker     Smokeless tobacco: Never Used     Alcohol use No       ROS:  General: negative for fever  ABD: Denies abd pain  : as above    OBJECTIVE:  /66 (BP Location: Left arm, Patient Position: Chair)  Temp 98.7  F (37.1  C) (Oral)  Wt 185 lb (83.9 kg)  LMP 2017 (Exact Date)  SpO2 100%  Breastfeeding? No  BMI 31.76 kg/m2   General:   awake, alert, and cooperative.  NAD.   Head: Normocephalic, atraumatic.  Eyes: Conjunctiva clear, non icteric.   ABD: soft, no tenderness to palpation , no rigidity, guarding or rebound . No CVAT  Neuro: Alert and oriented -  normal speech.   Results for orders placed or performed in visit on 08/20/17   Beta HCG qual IFA urine   Result Value Ref Range    Beta HCG Qual IFA Urine Positive (A) NEG^Negative      *UA reflex to Microscopic and Culture ([facility] (except [place] and [place])   Result Value Ref Range    Color Urine Yellow     Appearance Urine Clear     Glucose Urine Negative NEG^Negative mg/dL    Bilirubin Urine Negative NEG^Negative    Ketones Urine Negative NEG^Negative mg/dL    Specific Gravity Urine 1.010 1.003 - 1.035    Blood Urine Negative NEG^Negative    pH Urine 7.5 (H) 5.0 - 7.0 pH    Protein Albumin Urine Negative NEG^Negative mg/dL    Urobilinogen Urine 0.2 0.2 - 1.0 EU/dL    Nitrite Urine Negative NEG^Negative    Leukocyte Esterase Urine Negative NEG^Negative    Source Midstream Urine    Wet prep   Result Value Ref Range    Specimen Description Vagina     Wet Prep No Trichomonas seen     Wet Prep No clue cells seen     Wet Prep Yeast seen (A)        ASSESSMENT:      ICD-10-CM    1. Vaginal discharge N89.8 Wet prep   2. Urinary problem R39.89 *UA reflex to Microscopic and Culture ([facility] (except [place] and [place])   3. Candidiasis of vulva and vagina B37.3 clotrimazole (LOTRIMIN) 1 % cream   4. Positive urine pregnancy test Z32.01          PLAN: Prenatal vit she is on has pork in it so she will not take it. She needs to call the [phone] number to find a brand that does not have pork in it and let her OB know it.  DC Clarithromycin and contact her provider for alternative  As per ordered above.  Drink plenty of fluids.  Schedule initial OB appt.    Miranda Heller PA-C

## 2017-08-20 NOTE — NURSING NOTE
"Initial /66 (BP Location: Left arm, Patient Position: Chair)  Temp 98.7  F (37.1  C) (Oral)  Wt 185 lb (83.9 kg)  LMP 07/08/2017 (Exact Date)  SpO2 100%  Breastfeeding? No  BMI 31.76 kg/m2 Estimated body mass index is 31.76 kg/(m^2) as calculated from the following:    Height as of 7/5/17: 5' 4\" (1.626 m).    Weight as of this encounter: 185 lb (83.9 kg). .    "

## 2017-08-20 NOTE — MR AVS SNAPSHOT
After Visit Summary   8/20/2017    Tanya Denny    MRN: [id number]           Patient Information     Date Of Birth          1980        Visit Information        Provider Department      8/20/2017 10:55 AM Miranda Heller PA-C [facility]        Today's Diagnoses     Vaginal discharge    -  1    Urinary problem        Candidiasis of vulva and vagina        Positive urine pregnancy test           Follow-ups after your visit        Who to contact     If you have questions or need follow up information about today's clinic visit or your schedule please contact [facility] directly at [phone].  Normal or non-critical lab and imaging results will be communicated to you by Exabrehart, letter or phone within 4 business days after the clinic has received the results. If you do not hear from us within 7 days, please contact the clinic through Exabrehart or phone. If you have a critical or abnormal lab result, we will notify you by phone as soon as possible.  Submit refill requests through SkyFuel or call your pharmacy and they will forward the refill request to us. Please allow 3 business days for your refill to be completed.          Additional Information About Your Visit        MyChart Information     SkyFuel gives you secure access to your electronic health record. If you see a primary care provider, you can also send messages to your care team and make appointments. If you have questions, please call your primary care clinic.  If you do not have a primary care provider, please call [phone] and they will assist you.        Care EveryWhere ID     This is your Care EveryWhere ID. This could be used by other organizations to access your [place] medical records  [id number]        Your Vitals Were     Temperature Last Period Pulse Oximetry Breastfeeding? BMI (Body Mass Index)       98.7  F (37.1  C) (Oral) 07/08/2017 (Exact Date) 100% No 31.76 kg/m2         Blood Pressure from Last 3 Encounters:   08/20/17 101/66   08/02/17 105/70   07/18/17 109/77    Weight from Last 3 Encounters:   08/20/17 185 lb (83.9 kg)   08/02/17 186 lb 3.2 oz (84.5 kg)   07/18/17 182 lb 9.6 oz (82.8 kg)              We Performed the Following     *UA reflex to Microscopic and Culture ([place] [facility] (except [place] and [place])     Beta HCG qual IFA urine     Wet prep          Today's Medication Changes          These changes are accurate as of: 8/20/17 11:45 AM.  If you have any questions, ask your nurse or doctor.               Start taking these medicines.        Dose/Directions    clotrimazole 1 % cream   Commonly known as:  LOTRIMIN   Used for:  Candidiasis of vulva and vagina   Started by:  Miranda Heller PA-C        Dose:  1 Applicatorful   Place 1 Applicatorful vaginally daily for 7 days   Quantity:  45 g   Refills:  0            Where to get your medicines      These medications were sent to [facility] [place], [place] - [address]  [address], [facility] [address]     Phone:  [phone]     clotrimazole 1 % cream                Primary Care Provider    Pcp Unknown Verified       No address on file        Equal Access to Services     FRANCISCO STAUFFER AH: Hadii aad ku hadasho Soomaali, waaxda luqadaha, qaybta kaalmada adeegyada, waxay idiin haymonan alexey jenkins. So [facility] [phone].    ATENCIÓN: Si habla español, tiene a bautista disposición servicios gratuitos de asistencia lingüística. Llame al [phone].    We comply with applicable federal civil rights laws and [place] laws. We do not discriminate on the basis of race, color, national origin, age, disability sex, sexual orientation or gender identity.            Thank you!     Thank you for choosing [facility]  for your care. Our goal is always to provide you with excellent care. Hearing back from our patients is one way we can continue to  improve our services. Please take a few minutes to complete the written survey that you may receive in the mail after your visit with us. Thank you!             Your Updated Medication List - Protect others around you: Learn how to safely use, store and throw away your medicines at www.disposemymeds.org.          This list is accurate as of: 8/20/17 11:45 AM.  Always use your most recent med list.                   Brand Name Dispense Instructions for use Diagnosis    clarithromycin 500 MG tablet    BIAXIN    28 tablet    Take 1 tablet (500 mg) by mouth 2 times daily    Helicobacter positive gastritis       clotrimazole 1 % cream    LOTRIMIN    45 g    Place 1 Applicatorful vaginally daily for 7 days    Candidiasis of vulva and vagina       ferrous sulfate 325 (65 FE) MG tablet    IRON    90 tablet    Take 1 tablet (325 mg) by mouth daily (with breakfast)    Iron deficiency anemia, unspecified iron deficiency anemia type       MULTI PRENATAL 27-0.8 MG Tabs     90 tablet    Take 1 tablet by mouth daily    Trying to get pregnant       omeprazole 40 MG capsule    priLOSEC    14 capsule    Take 1 capsule (40 mg) by mouth daily Take 30-60 minutes before a meal.    Nausea

## 2017-08-21 NOTE — TELEPHONE ENCOUNTER
Patient called would like to establish prenatal care   Patient LMP 17  Patient is   Patient complains of nothing at this time

## 2017-08-23 ENCOUNTER — OFFICE VISIT (OUTPATIENT)
Dept: OBGYN | Facility: CLINIC | Age: 37
End: 2017-08-23
Attending: ADVANCED PRACTICE MIDWIFE
Payer: COMMERCIAL

## 2017-08-23 VITALS
HEART RATE: 77 BPM | WEIGHT: 190 LBS | BODY MASS INDEX: 32.44 KG/M2 | HEIGHT: 64 IN | DIASTOLIC BLOOD PRESSURE: 71 MMHG | SYSTOLIC BLOOD PRESSURE: 106 MMHG

## 2017-08-23 DIAGNOSIS — B37.31 YEAST INFECTION OF THE VAGINA: ICD-10-CM

## 2017-08-23 DIAGNOSIS — Z34.90 EARLY STAGE OF PREGNANCY: Primary | ICD-10-CM

## 2017-08-23 LAB
CLUE CELLS: NORMAL
TRICHOMONAS (WET PREP): NORMAL
YEAST (WET PREP): NORMAL

## 2017-08-23 PROCEDURE — 99212 OFFICE O/P EST SF 10 MIN: CPT | Mod: ZF

## 2017-08-23 PROCEDURE — 87210 SMEAR WET MOUNT SALINE/INK: CPT | Mod: ZF | Performed by: ADVANCED PRACTICE MIDWIFE

## 2017-08-23 RX ORDER — TERCONAZOLE 0.4 %
1 CREAM WITH APPLICATOR VAGINAL AT BEDTIME
Qty: 45 G | Refills: 0 | Status: SHIPPED | OUTPATIENT
Start: 2017-08-23 | End: 2017-12-22

## 2017-08-23 RX ORDER — PNV NO.95/FERROUS FUM/FOLIC AC 28MG-0.8MG
1 TABLET ORAL DAILY
Qty: 90 TABLET | Refills: 3 | Status: SHIPPED | OUTPATIENT
Start: 2017-08-23 | End: 2017-08-30

## 2017-08-23 ASSESSMENT — PAIN SCALES - GENERAL: PAINLEVEL: NO PAIN (0)

## 2017-08-23 NOTE — LETTER
"2017       RE: Tanya Denny  [address]  [place] [facility] [address]     Dear Colleague,    Thank you for referring your patient, Tanya Denny, to the [facility] at [facility]. Please see a copy of my visit note below.    SUBJECTIVE:  Tanya Denny 37 year old year old female, , who presents with vaginal itching externally and internally, burning and dysuria.  Reports symptoms started on Friday, 5 days ago.  Was seen in urgent care and given lotrimin rx for yeast infection. Pt reports symptoms have worsened since beginning treatment, discontinued treatment after 2 days. Denies any discharge or odor.     OBJECTIVE:   /71  Pulse 77  Ht 1.626 m (5' 4\")  Wt 86.2 kg (190 lb)  LMP 2017 (Exact Date)  BMI 32.61 kg/m2  She appears well, afebrile.    Pelvic Exam:  EG/BUS: Normal genitalia and Bartholin's, Urethra, [place]'s normal    Vagina: moist, pink, rugae with odorless and physiologic discharge; minimal discharge  Cervix: multiparous, and pink, moist without lesion or CMT  Uterus: gravid  Adnexa: deferred  Rectum: normal on visualization    POCT UA: not done.  POCT Wet prep: ph 4.0, yeast buds; neg clue cells, WBCs  POCT UPT: N/A    ASSESSMENT:   Yeast infection in early pregnancy    PLAN:   Rx sent for terazol x 7  Encouraged to start prenatal vitamins, non-gelatin rx sent  Return to clinic prn if symptoms persist or worsen.  OB intake appt scheduled for 2 weeks    Again, thank you for allowing me to participate in the care of your patient.      Sincerely,    TAMRA Francois CNM      "

## 2017-08-23 NOTE — PROGRESS NOTES
"SUBJECTIVE:  Tanya Denny 37 year old year old female, , who presents with vaginal itching externally and internally, burning and dysuria.  Reports symptoms started on Friday, 5 days ago.  Was seen in urgent care and given lotrimin rx for yeast infection. Pt reports symptoms have worsened since beginning treatment, discontinued treatment after 2 days. Denies any discharge or odor.     OBJECTIVE:   /71  Pulse 77  Ht 1.626 m (5' 4\")  Wt 86.2 kg (190 lb)  LMP 2017 (Exact Date)  BMI 32.61 kg/m2  She appears well, afebrile.    Pelvic Exam:  EG/BUS: Normal genitalia and Bartholin's, Urethra, [place]'s normal    Vagina: moist, pink, rugae with odorless and physiologic discharge; minimal discharge  Cervix: multiparous, and pink, moist without lesion or CMT  Uterus: gravid  Adnexa: deferred  Rectum: normal on visualization    POCT UA: not done.  POCT Wet prep: ph 4.0, yeast buds; neg clue cells, WBCs  POCT UPT: N/A    ASSESSMENT:   Yeast infection in early pregnancy    PLAN:   Rx sent for terazol x 7  Encouraged to start prenatal vitamins, non-gelatin rx sent  Return to clinic prn if symptoms persist or worsen.  OB intake appt scheduled for 2 weeks  "

## 2017-08-23 NOTE — MR AVS SNAPSHOT
"              After Visit Summary   8/23/2017    Tanya Denny    MRN: [id number]           Patient Information     Date Of Birth          1980        Visit Information        Provider Department      8/23/2017 3:45 PM Reed Yañez APRN [facility]        Today's Diagnoses     Early stage of pregnancy    -  1    Yeast infection of the vagina           Follow-ups after your visit        Follow-up notes from your care team     Return in about 2 weeks (around 9/6/2017).      Your next 10 appointments already scheduled     Sep 05, 2017  3:00 PM CDT   ULTRASOUND with [facility] ULTRASOUND   [facility] ([facility])    [facility] [address]  [address]  [facility] [address]   [phone]            Sep 05, 2017  3:30 PM CDT   OB INTAKE with TAMRA Berrios CNM   [facility] ([facility])    [facility] [address]  [address]  [facility] [address]   [phone]           Congratulations! You're Pregnant.  At [facility] we think of you as part of our team, working together toward a successful pregnancy and a healthy baby.  You might already have questions about all the different things that are happening to your body.  We have attached a link to our book \"The Expectant Family- From Pregnancy through Childbirth\" http://www.[employer]/350739.pdf to help answer some of those concerns. (You will be given a hard copy at your first visit in clinic)  Chapter 2 (page 20) is a must read- from questions about morning sickness, headaches, warning signs to look for, to:\"why do I have to go to the bathroom so often?\"   Our Doctors, Resident Physicians, Certified nurse midwives do work closely together as a team both in clinic and in the hospital to make this experience remarkable. Our patients have on numerous occasions expressed their " satisfaction in knowing that there is always a provider at the hospital or on the phone no matter what time of the day it is, to talk, triage or deliver their babies.  Your time is very important to us, so we will like you to know what to expect.  Routine Prenatal Visit Schedule:  Visit 1: 8 Weeks - Dating Ultrasound and Intake with Nurse  Visit 2: 10-12 Weeks- First Prenatal Visit & Exam with Midwife or Resident Physician, scheduling for early genetic screening at [facility] (optional)  Visit 3: 16-18 Weeks 18-20 Weeks- Level II Ultrasound done at [facility]- 4th Floor  Visit 4: 22-24 weeks  Visit 5: 28 Weeks- Extended Education Visit with Midwife or Resident Physician  Visit 6: 32 Weeks  Visit 7: 36 Weeks  Visit 8-11: 38-41 Weeks (Weekly Visits)  Changes can or may occur during your pregnancy. Your provider may ask you to come in more often for testing or monitoring frequently than stated above.  If you still have questions our triage nurses will be more than welcome to help you. Send us a message via MY Chart, our secure health Portal or give us a call anytime at [phone].  Thank You for allowing us to be part of your care.  Yours sincerely,  [facility]              Who to contact     Please call your clinic at [phone] to:    Ask questions about your health    Make or cancel appointments    Discuss your medicines    Learn about your test results    Speak to your doctor   If you have compliments or concerns about an experience at your clinic, or if you wish to file a complaint, please contact [facility] Patient Relations at [phone] or email us at [email or web address]         Additional Information About Your Visit        MyChart Information     Tipbithart gives you secure access to your electronic health record. If you see a primary care provider, you can also send messages to your care team and make appointments. If you have questions,  "please call your primary care clinic.  If you do not have a primary care provider, please call [phone] and they will assist you.      [employer] is an electronic gateway that provides easy, online access to your medical records. With [employer], you can request a clinic appointment, read your test results, renew a prescription or communicate with your care team.     To access your existing account, please contact your [facility] or call [phone] for assistance.        Care EveryWhere ID     This is your Care EveryWhere ID. This could be used by other organizations to access your [place] medical records  [id number]        Your Vitals Were     Pulse Height Last Period BMI (Body Mass Index)          77 1.626 m (5' 4\") 07/08/2017 (Exact Date) 32.61 kg/m2         Blood Pressure from Last 3 Encounters:   08/23/17 106/71   08/20/17 101/66   08/02/17 105/70    Weight from Last 3 Encounters:   08/23/17 86.2 kg (190 lb)   08/20/17 83.9 kg (185 lb)   08/02/17 84.5 kg (186 lb 3.2 oz)              We Performed the Following     Wet Prep POCT          Today's Medication Changes          These changes are accurate as of: 8/23/17  4:10 PM.  If you have any questions, ask your nurse or doctor.               Start taking these medicines.        Dose/Directions    Prenatal Vitamins 28-0.8 MG Tabs   Used for:  Early stage of pregnancy   Started by:  Reed Yañez APRN CNM        Dose:  1 tablet   Take 1 tablet by mouth daily   Quantity:  90 tablet   Refills:  3       terconazole 0.4 % cream   Commonly known as:  TERAZOL 7   Used for:  Yeast infection of the vagina   Started by:  Reed Yañez APRN CNM        Dose:  1 applicator   Place 1 applicator vaginally At Bedtime For 7 nights   Quantity:  45 g   Refills:  0            Where to get your medicines      These medications were sent to [facility]/pharmacy #[id number] - [place], [place] - [address]  [address], [place] [address]     " Phone:  [phone]     Prenatal Vitamins 28-0.8 MG Tabs    terconazole 0.4 % cream                Primary Care Provider    Pcp Unknown Verified       No address on file        Equal Access to Services     FRANCISCO STAUFFER : Hadii aad ku hadjulien Anguiano, refugio dooley, adarsh whitehead, sherri fourniermarci jenkins. So [facility] [phone].    ATENCIÓN: Si habla español, tiene a bautista disposición servicios gratuitos de asistencia lingüística. Llame al [phone].    We comply with applicable federal civil rights laws and [place] laws. We do not discriminate on the basis of race, color, national origin, age, disability sex, sexual orientation or gender identity.            Thank you!     Thank you for choosing [facility]  for your care. Our goal is always to provide you with excellent care. Hearing back from our patients is one way we can continue to improve our services. Please take a few minutes to complete the written survey that you may receive in the mail after your visit with us. Thank you!             Your Updated Medication List - Protect others around you: Learn how to safely use, store and throw away your medicines at www.disposemymeds.org.          This list is accurate as of: 8/23/17  4:10 PM.  Always use your most recent med list.                   Brand Name Dispense Instructions for use Diagnosis    clarithromycin 500 MG tablet    BIAXIN    28 tablet    Take 1 tablet (500 mg) by mouth 2 times daily    Helicobacter positive gastritis       clotrimazole 1 % cream    LOTRIMIN    45 g    Place 1 Applicatorful vaginally daily for 7 days    Candidiasis of vulva and vagina       Prenatal Vitamins 28-0.8 MG Tabs     90 tablet    Take 1 tablet by mouth daily    Early stage of pregnancy       terconazole 0.4 % cream    TERAZOL 7    45 g    Place 1 applicator vaginally At Bedtime For 7 nights    Yeast infection of the vagina

## 2017-08-28 ENCOUNTER — APPOINTMENT (OUTPATIENT)
Dept: ULTRASOUND IMAGING | Facility: CLINIC | Age: 37
End: 2017-08-28
Attending: EMERGENCY MEDICINE
Payer: COMMERCIAL

## 2017-08-28 ENCOUNTER — HOSPITAL ENCOUNTER (EMERGENCY)
Facility: CLINIC | Age: 37
Discharge: HOME | End: 2017-08-28
Attending: EMERGENCY MEDICINE | Admitting: EMERGENCY MEDICINE
Payer: COMMERCIAL

## 2017-08-28 ENCOUNTER — TELEPHONE (OUTPATIENT)
Dept: OBGYN | Facility: CLINIC | Age: 37
End: 2017-08-28

## 2017-08-28 VITALS
TEMPERATURE: 98.3 F | DIASTOLIC BLOOD PRESSURE: 72 MMHG | SYSTOLIC BLOOD PRESSURE: 117 MMHG | BODY MASS INDEX: 32.44 KG/M2 | OXYGEN SATURATION: 100 % | HEIGHT: 64 IN | RESPIRATION RATE: 16 BRPM | WEIGHT: 190 LBS

## 2017-08-28 DIAGNOSIS — Z3A.01 LESS THAN 8 WEEKS GESTATION OF PREGNANCY: ICD-10-CM

## 2017-08-28 DIAGNOSIS — O20.9 FIRST TRIMESTER BLEEDING: ICD-10-CM

## 2017-08-28 DIAGNOSIS — O20.9 VAGINAL BLEEDING IN PREGNANCY, FIRST TRIMESTER: ICD-10-CM

## 2017-08-28 LAB
ABO + RH BLD: NORMAL
ABO + RH BLD: NORMAL
ALBUMIN UR-MCNC: 10 MG/DL
ANION GAP SERPL CALCULATED.3IONS-SCNC: 7 MMOL/L (ref 3–14)
APPEARANCE UR: CLEAR
B-HCG SERPL-ACNC: 7372 IU/L (ref 0–5)
BACTERIA #/AREA URNS HPF: ABNORMAL /HPF
BASOPHILS # BLD AUTO: 0 10E9/L (ref 0–0.2)
BASOPHILS NFR BLD AUTO: 0.2 %
BILIRUB UR QL STRIP: NEGATIVE
BLOOD BANK CMNT PATIENT-IMP: NORMAL
BLOOD BANK CMNT PATIENT-IMP: NORMAL
BUN SERPL-MCNC: 6 MG/DL (ref 7–30)
CALCIUM SERPL-MCNC: 8.2 MG/DL (ref 8.5–10.1)
CHLORIDE SERPL-SCNC: 107 MMOL/L (ref 94–109)
CO2 SERPL-SCNC: 28 MMOL/L (ref 20–32)
COLOR UR AUTO: ABNORMAL
CREAT SERPL-MCNC: 0.64 MG/DL (ref 0.52–1.04)
DIFFERENTIAL METHOD BLD: ABNORMAL
EOSINOPHIL # BLD AUTO: 0.1 10E9/L (ref 0–0.7)
EOSINOPHIL NFR BLD AUTO: 1.5 %
ERYTHROCYTE [DISTWIDTH] IN BLOOD BY AUTOMATED COUNT: 13.4 % (ref 10–15)
FETAL CELL SCN BLD QL ROSETTE: NORMAL
GFR SERPL CREATININE-BSD FRML MDRD: >90 ML/MIN/1.7M2
GLUCOSE SERPL-MCNC: 82 MG/DL (ref 70–99)
GLUCOSE UR STRIP-MCNC: NEGATIVE MG/DL
HCG UR QL: POSITIVE
HCT VFR BLD AUTO: 31.1 % (ref 35–47)
HGB BLD-MCNC: 10.6 G/DL (ref 11.7–15.7)
HGB UR QL STRIP: ABNORMAL
IMM GRANULOCYTES # BLD: 0 10E9/L (ref 0–0.4)
IMM GRANULOCYTES NFR BLD: 0.2 %
INTERNAL QC OK POCT: YES
KETONES UR STRIP-MCNC: NEGATIVE MG/DL
LEUKOCYTE ESTERASE UR QL STRIP: ABNORMAL
LYMPHOCYTES # BLD AUTO: 2.1 10E9/L (ref 0.8–5.3)
LYMPHOCYTES NFR BLD AUTO: 32.6 %
MCH RBC QN AUTO: 27.5 PG (ref 26.5–33)
MCHC RBC AUTO-ENTMCNC: 34.1 G/DL (ref 31.5–36.5)
MCV RBC AUTO: 81 FL (ref 78–100)
MONOCYTES # BLD AUTO: 0.5 10E9/L (ref 0–1.3)
MONOCYTES NFR BLD AUTO: 7.9 %
MUCOUS THREADS #/AREA URNS LPF: PRESENT /LPF
NEUTROPHILS # BLD AUTO: 3.7 10E9/L (ref 1.6–8.3)
NEUTROPHILS NFR BLD AUTO: 57.6 %
NITRATE UR QL: NEGATIVE
NRBC # BLD AUTO: 0 10*3/UL
NRBC BLD AUTO-RTO: 0 /100
PH UR STRIP: 7 PH (ref 5–7)
PLATELET # BLD AUTO: 209 10E9/L (ref 150–450)
POTASSIUM SERPL-SCNC: 3.2 MMOL/L (ref 3.4–5.3)
RBC # BLD AUTO: 3.86 10E12/L (ref 3.8–5.2)
RBC #/AREA URNS AUTO: >182 /HPF (ref 0–2)
RH IG VIALS RECOM PATIENT: NORMAL
SODIUM SERPL-SCNC: 142 MMOL/L (ref 133–144)
SOURCE: ABNORMAL
SP GR UR STRIP: 1.01 (ref 1–1.03)
SQUAMOUS #/AREA URNS AUTO: 1 /HPF (ref 0–1)
UROBILINOGEN UR STRIP-MCNC: NORMAL MG/DL (ref 0–2)
WBC # BLD AUTO: 6.5 10E9/L (ref 4–11)
WBC #/AREA URNS AUTO: 6 /HPF (ref 0–2)

## 2017-08-28 PROCEDURE — 86901 BLOOD TYPING SEROLOGIC RH(D): CPT | Performed by: EMERGENCY MEDICINE

## 2017-08-28 PROCEDURE — 84702 CHORIONIC GONADOTROPIN TEST: CPT | Performed by: EMERGENCY MEDICINE

## 2017-08-28 PROCEDURE — 85461 HEMOGLOBIN FETAL: CPT | Performed by: EMERGENCY MEDICINE

## 2017-08-28 PROCEDURE — 85025 COMPLETE CBC W/AUTO DIFF WBC: CPT | Performed by: EMERGENCY MEDICINE

## 2017-08-28 PROCEDURE — 99284 EMERGENCY DEPT VISIT MOD MDM: CPT | Mod: Z6 | Performed by: EMERGENCY MEDICINE

## 2017-08-28 PROCEDURE — 99284 EMERGENCY DEPT VISIT MOD MDM: CPT | Mod: 25 | Performed by: EMERGENCY MEDICINE

## 2017-08-28 PROCEDURE — 80048 BASIC METABOLIC PNL TOTAL CA: CPT | Performed by: EMERGENCY MEDICINE

## 2017-08-28 PROCEDURE — 76801 OB US < 14 WKS SINGLE FETUS: CPT

## 2017-08-28 PROCEDURE — 81025 URINE PREGNANCY TEST: CPT | Performed by: EMERGENCY MEDICINE

## 2017-08-28 PROCEDURE — 86900 BLOOD TYPING SEROLOGIC ABO: CPT | Performed by: EMERGENCY MEDICINE

## 2017-08-28 PROCEDURE — 81001 URINALYSIS AUTO W/SCOPE: CPT | Performed by: EMERGENCY MEDICINE

## 2017-08-28 ASSESSMENT — ENCOUNTER SYMPTOMS
BACK PAIN: 1
ABDOMINAL PAIN: 0

## 2017-08-28 NOTE — ED AVS SNAPSHOT
[facility], Emergency Department    [address]    [facility] [place] [address]    Phone:  [phone]    Fax:  [phone]                                       Tanya Denny   MRN: [id number]    Department:  [facility], Emergency Department   Date of Visit:  8/28/2017           After Visit Summary Signature Page     I have received my discharge instructions, and my questions have been answered. I have discussed any challenges I see with this plan with the nurse or doctor.    ..........................................................................................................................................  Patient/Patient Representative Signature      ..........................................................................................................................................  Patient Representative Print Name and Relationship to Patient    ..................................................               ................................................  Date                                            Time    ..........................................................................................................................................  Reviewed by Signature/Title    ...................................................              ..............................................  Date                                                            Time

## 2017-08-28 NOTE — ED AVS SNAPSHOT
[facility], Emergency Department    [address]    [place] [address]    Phone:  [phone]    Fax:  [phone]                                       Tanya Denny   MRN: [id number]    Department:  [facility], Emergency Department   Date of Visit:  8/28/2017           Patient Information     Date Of Birth          1980        Your diagnoses for this visit were:     Vaginal bleeding in pregnancy, first trimester        You were seen by Calin Bartlett MD.        Discharge Instructions       Please make an appointment to follow up with OB as soon as possible.  Results for orders placed or performed during the hospital encounter of 08/28/17   US OB < 14 Weeks Single    Narrative    US OB < 14 WEEKS SINGLE  8/28/2017 9:52 PM    HISTORY: Bleeding during early pregnancy.    TECHNIQUE: Transabdominal imaging only was performed.    COMPARISON:  None.    FINDINGS:      Estimated gestational age by current ultrasound measurement: 6 weeks 6  days.  Estimated date of delivery based on this ultrasound: 4/17/2018.  Crown-rump length: 0.8 cm.   Embryonic cardiac activity: 149 bpm.   Yolk sac: Present.  Subchorionic hemorrhage: None.    Right ovary: Unremarkable.  Left ovary: Unremarkable.  Adnexal mass: None.  Free pelvic fluid: Small amount of anechoic free fluid is noted within  the cul-de-sac.      Impression    IMPRESSION:   1. Single live intrauterine pregnancy of estimated gestational age 6  weeks 6 days.  2. Small amount of anechoic free fluid in the pelvis is nonspecific.    LUL RIGGS MD   CBC with platelets differential   Result Value Ref Range    WBC 6.5 4.0 - 11.0 10e9/L    RBC Count 3.86 3.8 - 5.2 10e12/L    Hemoglobin 10.6 (L) 11.7 - 15.7 g/dL    Hematocrit 31.1 (L) 35.0 - 47.0 %    MCV 81 78 - 100 fl    MCH 27.5 26.5 - 33.0 pg    MCHC 34.1 31.5 - 36.5 g/dL    RDW 13.4 10.0 - 15.0 %    Platelet Count 209 150 - 450 10e9/L    Diff Method Automated Method     % Neutrophils  57.6 %    % Lymphocytes 32.6 %    % Monocytes 7.9 %    % Eosinophils 1.5 %    % Basophils 0.2 %    % Immature Granulocytes 0.2 %    Nucleated RBCs 0 0 /100    Absolute Neutrophil 3.7 1.6 - 8.3 10e9/L    Absolute Lymphocytes 2.1 0.8 - 5.3 10e9/L    Absolute Monocytes 0.5 0.0 - 1.3 10e9/L    Absolute Eosinophils 0.1 0.0 - 0.7 10e9/L    Absolute Basophils 0.0 0.0 - 0.2 10e9/L    Abs Immature Granulocytes 0.0 0 - 0.4 10e9/L    Absolute Nucleated RBC 0.0    Basic metabolic panel   Result Value Ref Range    Sodium 142 133 - 144 mmol/L    Potassium 3.2 (L) 3.4 - 5.3 mmol/L    Chloride 107 94 - 109 mmol/L    Carbon Dioxide 28 20 - 32 mmol/L    Anion Gap 7 3 - 14 mmol/L    Glucose 82 70 - 99 mg/dL    Urea Nitrogen 6 (L) 7 - 30 mg/dL    Creatinine 0.64 0.52 - 1.04 mg/dL    GFR Estimate >90 >60 mL/min/1.7m2    GFR Estimate If Black >90 >60 mL/min/1.7m2    Calcium 8.2 (L) 8.5 - 10.1 mg/dL   HCG QUANTitative pregnancy   Result Value Ref Range    HCG Quantitative Serum 7372 (H) 0 - 5 IU/L   UA with Microscopic reflex to Culture   Result Value Ref Range    Color Urine Light Red     Appearance Urine Clear     Glucose Urine Negative NEG^Negative mg/dL    Bilirubin Urine Negative NEG^Negative    Ketones Urine Negative NEG^Negative mg/dL    Specific Gravity Urine 1.008 1.003 - 1.035    Blood Urine Moderate (A) NEG^Negative    pH Urine 7.0 5.0 - 7.0 pH    Protein Albumin Urine 10 (A) NEG^Negative mg/dL    Urobilinogen mg/dL Normal 0.0 - 2.0 mg/dL    Nitrite Urine Negative NEG^Negative    Leukocyte Esterase Urine Trace (A) NEG^Negative    Source Unspecified Urine     WBC Urine 6 (H) 0 - 2 /HPF    RBC Urine >182 (H) 0 - 2 /HPF    Bacteria Urine Few (A) NEG^Negative /HPF    Squamous Epithelial /HPF Urine 1 0 - 1 /HPF    Mucous Urine Present (A) NEG^Negative /LPF   hCG qual urine POCT   Result Value Ref Range    HCG Qual Urine Positive neg    Internal QC OK Yes    Rho (D) immune globulin (RhoGam) Lab Study   Result Value Ref Range     Rhogam Order Order received    Rh Immune Globulin Study   Result Value Ref Range    ABO O     RH(D) Pos     Fetal Blood Screen Canceled, Test credited     Blood Bank Comment       Not suitable for Rh Immune Globulin  Patient is Rh Positive      Amount of RHIG Required Not suitable for Rh Immune Globulin              Future Appointments        Provider Department Dept Phone Center    9/5/2017 3:00 PM S Ultrasound [facility] [phone] [facility]    9/5/2017 3:30 PM TAMRA Madison CN [facility] [phone] [facility]      24 Hour Appointment Hotline       To make an appointment at any [facility], call [phone] ([phone]). If you don't have a family doctor or clinic, we will help you find one. [place] clinics are conveniently located to serve the needs of you and your family.             Review of your medicines      Our records show that you are taking the medicines listed below. If these are incorrect, please call your family doctor or clinic.        Dose / Directions Last dose taken    clarithromycin 500 MG tablet   Commonly known as:  BIAXIN   Dose:  500 mg   Quantity:  28 tablet        Take 1 tablet (500 mg) by mouth 2 times daily   Refills:  0        Prenatal Vitamins 28-0.8 MG Tabs   Dose:  1 tablet   Quantity:  90 tablet        Take 1 tablet by mouth daily   Refills:  3        terconazole 0.4 % cream   Commonly known as:  TERAZOL 7   Dose:  1 applicator   Quantity:  45 g        Place 1 applicator vaginally At Bedtime For 7 nights   Refills:  0          ASK your doctor about these medications        Dose / Directions Last dose taken    clotrimazole 1 % cream   Commonly known as:  LOTRIMIN   Dose:  1 Applicatorful   Quantity:  45 g   Ask about: Should I take this medication?        Place 1 Applicatorful vaginally daily for 7 days   Refills:  0                Procedures and tests performed during your visit     Basic metabolic panel     CBC with platelets differential    HCG QUANTitative pregnancy    Rh Immune Globulin Study    Rho (D) immune globulin (RhoGam) Lab Study    UA with Microscopic reflex to Culture    US OB < 14 Weeks Single    hCG qual urine POCT      Orders Needing Specimen Collection     None      Pending Results     No orders found from 8/26/2017 to 8/29/2017.            Pending Culture Results     No orders found from 8/26/2017 to 8/29/2017.            Pending Results Instructions     If you had any lab results that were not finalized at the time of your Discharge, you can call the ED Lab Result RN at [phone]. You will be contacted by this team for any positive Lab results or changes in treatment. The nurses are available 7 days a week from 10A to 6:30P.  You can leave a message 24 hours per day and they will return your call.        Thank you for choosing [facility]       Thank you for choosing [facility] for your care. Our goal is always to provide you with excellent care. Hearing back from our patients is one way we can continue to improve our services. Please take a few minutes to complete the written survey that you may receive in the mail after you visit with us. Thank you!        [employer] Information     amiando gives you secure access to your electronic health record. If you see a primary care provider, you can also send messages to your care team and make appointments. If you have questions, please call your primary care clinic.  If you do not have a primary care provider, please call [phone] and they will assist you.        Care EveryWhere ID     This is your Care EveryWhere ID. This could be used by other organizations to access your [facility] medical records  [id number]        Equal Access to Services     FRANCISCO STAUFFER : Duane Anguiano, watom dooley, qaybta kaaljuancarlos adesherri biswas. So [facility] [phone].    ATENCIÓN: Si kingsley meléndez, sofiya a bautista disposición  servicios gratuitos de asistencia lingüística. Manish melgar [phone].    We comply with applicable federal civil rights laws and [place] laws. We do not discriminate on the basis of race, color, national origin, age, disability sex, sexual orientation or gender identity.            After Visit Summary       This is your record. Keep this with you and show to your community pharmacist(s) and doctor(s) at your next visit.

## 2017-08-28 NOTE — LETTER
To Whom it may concern:      Tanya Denny was seen in our Emergency Department today, 08/28/17.  I expect her condition to improve over the next 2 days.  she may return to work when improved.    Sincerely,  Calin Bartlett MD

## 2017-08-29 ENCOUNTER — TELEPHONE (OUTPATIENT)
Dept: OBGYN | Facility: CLINIC | Age: 37
End: 2017-08-29

## 2017-08-29 NOTE — TELEPHONE ENCOUNTER
Spoke to Tanya GRESHAM  with LMP 17,who was seen in ED last night for first trimester bleeding and told to call her clinic and get seen.    She reports bleeding less today. Denies any pain or cramping. Yesterday bleeding was like a period.Blood type O+. HCG quant 7372.  She is scheduled for dating US and OB intake in clinic next 17. Instructed to get repeat HCG tomorrow. Bleeding precautions reviewed and when to call clinic.Pt indicated understanding and agreed with plan.     US done in ED:Estimated gestational age by current ultrasound measurement: 6 weeks 6  days.  Estimated date of delivery based on this ultrasound: 2018.  Crown-rump length: 0.8 cm.   Embryonic cardiac activity: 149 bpm.   Yolk sac: Present.  Subchorionic hemorrhage: None.     Right ovary: Unremarkable.  Left ovary: Unremarkable.  Adnexal mass: None.  Free pelvic fluid: Small amount of anechoic free fluid is noted within  the cul-de-sac.         IMPRESSION:   1. Single live intrauterine pregnancy of estimated gestational age 6  weeks 6 days.  2. Small amount of anechoic free fluid in the pelvis is nonspecific.

## 2017-08-29 NOTE — TELEPHONE ENCOUNTER
Received call from patient at home. Briefly this is a  at 7w2d by LMP who calls with chief complaint of vaginal bleeding. She reports this began just a few minutes before she called. States it is red with wiping, sometimes dark, sometimes bright. Has not saturated a pad. Is a bit more than spotting. She denies passage of tissue. Denies cramping or other abdominal pain. Denies nausea, vomiting, fevers chills. She has not had an US yet in this pregnancy. We discussed that vaginal bleeding in the first trimester is quite common - could be related to subchorionic hemorrhage versus threatened miscarriage. We discussed that reasonable options ofr management would include: monitoring bleeding over the next few hours and following up in clinic in the next few days if it subsides versus presenting to the ED for evaluation, US. She will monitor her symptoms at home and if she feels uncomfortable or her bleeding worsens will come to the ED for evaluation.    Stacey Kapadia MD  OBGYN PGY-3  [phone]  8:18 PM 2017

## 2017-08-29 NOTE — DISCHARGE INSTRUCTIONS
Please make an appointment to follow up with OB as soon as possible.  Results for orders placed or performed during the hospital encounter of 08/28/17   US OB < 14 Weeks Single    Narrative    US OB < 14 WEEKS SINGLE  8/28/2017 9:52 PM    HISTORY: Bleeding during early pregnancy.    TECHNIQUE: Transabdominal imaging only was performed.    COMPARISON:  None.    FINDINGS:      Estimated gestational age by current ultrasound measurement: 6 weeks 6  days.  Estimated date of delivery based on this ultrasound: 4/17/2018.  Crown-rump length: 0.8 cm.   Embryonic cardiac activity: 149 bpm.   Yolk sac: Present.  Subchorionic hemorrhage: None.    Right ovary: Unremarkable.  Left ovary: Unremarkable.  Adnexal mass: None.  Free pelvic fluid: Small amount of anechoic free fluid is noted within  the cul-de-sac.      Impression    IMPRESSION:   1. Single live intrauterine pregnancy of estimated gestational age 6  weeks 6 days.  2. Small amount of anechoic free fluid in the pelvis is nonspecific.    LUL RIGGS MD   CBC with platelets differential   Result Value Ref Range    WBC 6.5 4.0 - 11.0 10e9/L    RBC Count 3.86 3.8 - 5.2 10e12/L    Hemoglobin 10.6 (L) 11.7 - 15.7 g/dL    Hematocrit 31.1 (L) 35.0 - 47.0 %    MCV 81 78 - 100 fl    MCH 27.5 26.5 - 33.0 pg    MCHC 34.1 31.5 - 36.5 g/dL    RDW 13.4 10.0 - 15.0 %    Platelet Count 209 150 - 450 10e9/L    Diff Method Automated Method     % Neutrophils 57.6 %    % Lymphocytes 32.6 %    % Monocytes 7.9 %    % Eosinophils 1.5 %    % Basophils 0.2 %    % Immature Granulocytes 0.2 %    Nucleated RBCs 0 0 /100    Absolute Neutrophil 3.7 1.6 - 8.3 10e9/L    Absolute Lymphocytes 2.1 0.8 - 5.3 10e9/L    Absolute Monocytes 0.5 0.0 - 1.3 10e9/L    Absolute Eosinophils 0.1 0.0 - 0.7 10e9/L    Absolute Basophils 0.0 0.0 - 0.2 10e9/L    Abs Immature Granulocytes 0.0 0 - 0.4 10e9/L    Absolute Nucleated RBC 0.0    Basic metabolic panel   Result Value Ref Range    Sodium 142 133 - 144 mmol/L     Potassium 3.2 (L) 3.4 - 5.3 mmol/L    Chloride 107 94 - 109 mmol/L    Carbon Dioxide 28 20 - 32 mmol/L    Anion Gap 7 3 - 14 mmol/L    Glucose 82 70 - 99 mg/dL    Urea Nitrogen 6 (L) 7 - 30 mg/dL    Creatinine 0.64 0.52 - 1.04 mg/dL    GFR Estimate >90 >60 mL/min/1.7m2    GFR Estimate If Black >90 >60 mL/min/1.7m2    Calcium 8.2 (L) 8.5 - 10.1 mg/dL   HCG QUANTitative pregnancy   Result Value Ref Range    HCG Quantitative Serum 7372 (H) 0 - 5 IU/L   UA with Microscopic reflex to Culture   Result Value Ref Range    Color Urine Light Red     Appearance Urine Clear     Glucose Urine Negative NEG^Negative mg/dL    Bilirubin Urine Negative NEG^Negative    Ketones Urine Negative NEG^Negative mg/dL    Specific Gravity Urine 1.008 1.003 - 1.035    Blood Urine Moderate (A) NEG^Negative    pH Urine 7.0 5.0 - 7.0 pH    Protein Albumin Urine 10 (A) NEG^Negative mg/dL    Urobilinogen mg/dL Normal 0.0 - 2.0 mg/dL    Nitrite Urine Negative NEG^Negative    Leukocyte Esterase Urine Trace (A) NEG^Negative    Source Unspecified Urine     WBC Urine 6 (H) 0 - 2 /HPF    RBC Urine >182 (H) 0 - 2 /HPF    Bacteria Urine Few (A) NEG^Negative /HPF    Squamous Epithelial /HPF Urine 1 0 - 1 /HPF    Mucous Urine Present (A) NEG^Negative /LPF   hCG qual urine POCT   Result Value Ref Range    HCG Qual Urine Positive neg    Internal QC OK Yes    Rho (D) immune globulin (RhoGam) Lab Study   Result Value Ref Range    Rhogam Order Order received    Rh Immune Globulin Study   Result Value Ref Range    ABO O     RH(D) Pos     Fetal Blood Screen Canceled, Test credited     Blood Bank Comment       Not suitable for Rh Immune Globulin  Patient is Rh Positive      Amount of RHIG Required Not suitable for Rh Immune Globulin

## 2017-08-29 NOTE — ED PROVIDER NOTES
History     Chief Complaint   Patient presents with     Vaginal Bleeding     Pt states she is 7 wks pregnant is having bleeding. Pt states it was just a little bit then when she went to the bathroom when she got here there was no more. Denies abd pain.     HPI  Tanya Denny is a 37 year old,  female, 7w2d pregnant by last menstrual period of 2017, who presents with vaginal bleeding. The patient reports that she developed some abdominal cramping this morning that dissipated later in the day. However, around 6:00 PM today she then started having vaginal bleeding. She notes that that she has saturated 1 pad over the course of 4 hours. She denies abdominal pain or cramping currently, though complains of cramping in her back. Her last bowel movement was this morning. The patient reports that she was given a medication for H.pylori and was also given a medication for yeast infection, however, she has not taken the medications because she was concerned about the risk with her pregnancy.     Past Medical History:   Diagnosis Date     SAB (spontaneous )     1ST TRIMESTER       Past Surgical History:   Procedure Laterality Date     D & C         Family History   Problem Relation Age of Onset     Allergies Mother      angioedema in family members unspecified     DIABETES Mother      Hypertension Mother      DIABETES Father      Hypertension Father      DIABETES Maternal Grandmother      Hypertension Maternal Grandmother      DIABETES Maternal Grandfather      Hypertension Maternal Grandfather      Asthma Maternal Grandfather      CANCER No family hx of      CEREBROVASCULAR DISEASE No family hx of      Thyroid Disease No family hx of      Glaucoma No family hx of      Macular Degeneration No family hx of      Breast Cancer No family hx of      Colon Cancer No family hx of      Depression No family hx of      Anxiety Disorder No family hx of      Asthma Maternal Grandmother      Genetic  "Disorder No family hx of        Social History   Substance Use Topics     Smoking status: Never Smoker     Smokeless tobacco: Never Used     Alcohol use No     Current Facility-Administered Medications   Medication     Blood Bank will determine if patient is eligible for and the proper dosage of Rho (D) immune globulin (RhoGam)     NO Rho (D)  immune globulin (RhoGam) needed  - mother INELIGIBLE     Current Outpatient Prescriptions   Medication     Prenatal Vit-Fe Fumarate-FA (PRENATAL VITAMINS) 28-0.8 MG TABS     terconazole (TERAZOL 7) 0.4 % cream     clarithromycin (BIAXIN) 500 MG tablet      No Known Allergies     I have reviewed the Medications, Allergies, Past Medical and Surgical History, and Social History in the Epic system.    Review of Systems   Gastrointestinal: Negative for abdominal pain.   Genitourinary: Positive for vaginal bleeding.   Musculoskeletal: Positive for back pain (cramping).   All other systems reviewed and are negative.      Physical Exam   BP: 99/56  Heart Rate: 80  Temp: 98.3  F (36.8  C)  Resp: 16  Height: 162.6 cm (5' 4\")  Weight: 86.2 kg (190 lb)  SpO2: 100 %  Physical Exam   Constitutional: She is oriented to person, place, and time. She appears well-developed and well-nourished. No distress.   HENT:   Head: Normocephalic and atraumatic.   Eyes: No scleral icterus.   Neck: Normal range of motion. Neck supple.   Cardiovascular: Normal rate.    Pulmonary/Chest: Effort normal. No respiratory distress.   Abdominal: Soft. There is no tenderness.   Genitourinary:   Genitourinary Comments: Pt refused vaginal exam     Neurological: She is alert and oriented to person, place, and time.   Skin: Skin is warm and dry. No rash noted. She is not diaphoretic. No erythema. No pallor.       ED Course     ED Course     Procedures     10:11 PM  The patient was seen and examined by Dr. Bartlett in Room 5.               Critical Care time:  none             Labs Ordered and Resulted from Time of ED " Arrival Up to the Time of Departure from the ED   CBC WITH PLATELETS DIFFERENTIAL - Abnormal; Notable for the following:        Result Value    Hemoglobin 10.6 (*)     Hematocrit 31.1 (*)     All other components within normal limits   BASIC METABOLIC PANEL - Abnormal; Notable for the following:     Potassium 3.2 (*)     Urea Nitrogen 6 (*)     Calcium 8.2 (*)     All other components within normal limits   HCG QUANTITATIVE PREGNANCY - Abnormal; Notable for the following:     HCG Quantitative Serum 7372 (*)     All other components within normal limits   ROUTINE UA WITH MICROSCOPIC REFLEX TO CULTURE - Abnormal; Notable for the following:     Blood Urine Moderate (*)     Protein Albumin Urine 10 (*)     Leukocyte Esterase Urine Trace (*)     WBC Urine 6 (*)     RBC Urine >182 (*)     Bacteria Urine Few (*)     Mucous Urine Present (*)     All other components within normal limits   HCG QUAL URINE POCT - Abnormal; Notable for the following:    RHOGAM ORDER   RH IMMUNE GLOBULIN STUDY     Results for orders placed or performed during the hospital encounter of 08/28/17   US OB < 14 Weeks Single    Narrative    US OB < 14 WEEKS SINGLE  8/28/2017 9:52 PM    HISTORY: Bleeding during early pregnancy.    TECHNIQUE: Transabdominal imaging only was performed.    COMPARISON:  None.    FINDINGS:      Estimated gestational age by current ultrasound measurement: 6 weeks 6  days.  Estimated date of delivery based on this ultrasound: 4/17/2018.  Crown-rump length: 0.8 cm.   Embryonic cardiac activity: 149 bpm.   Yolk sac: Present.  Subchorionic hemorrhage: None.    Right ovary: Unremarkable.  Left ovary: Unremarkable.  Adnexal mass: None.  Free pelvic fluid: Small amount of anechoic free fluid is noted within  the cul-de-sac.      Impression    IMPRESSION:   1. Single live intrauterine pregnancy of estimated gestational age 6  weeks 6 days.  2. Small amount of anechoic free fluid in the pelvis is nonspecific.    LUL RIGGS MD    CBC with platelets differential   Result Value Ref Range    WBC 6.5 4.0 - 11.0 10e9/L    RBC Count 3.86 3.8 - 5.2 10e12/L    Hemoglobin 10.6 (L) 11.7 - 15.7 g/dL    Hematocrit 31.1 (L) 35.0 - 47.0 %    MCV 81 78 - 100 fl    MCH 27.5 26.5 - 33.0 pg    MCHC 34.1 31.5 - 36.5 g/dL    RDW 13.4 10.0 - 15.0 %    Platelet Count 209 150 - 450 10e9/L    Diff Method Automated Method     % Neutrophils 57.6 %    % Lymphocytes 32.6 %    % Monocytes 7.9 %    % Eosinophils 1.5 %    % Basophils 0.2 %    % Immature Granulocytes 0.2 %    Nucleated RBCs 0 0 /100    Absolute Neutrophil 3.7 1.6 - 8.3 10e9/L    Absolute Lymphocytes 2.1 0.8 - 5.3 10e9/L    Absolute Monocytes 0.5 0.0 - 1.3 10e9/L    Absolute Eosinophils 0.1 0.0 - 0.7 10e9/L    Absolute Basophils 0.0 0.0 - 0.2 10e9/L    Abs Immature Granulocytes 0.0 0 - 0.4 10e9/L    Absolute Nucleated RBC 0.0    Basic metabolic panel   Result Value Ref Range    Sodium 142 133 - 144 mmol/L    Potassium 3.2 (L) 3.4 - 5.3 mmol/L    Chloride 107 94 - 109 mmol/L    Carbon Dioxide 28 20 - 32 mmol/L    Anion Gap 7 3 - 14 mmol/L    Glucose 82 70 - 99 mg/dL    Urea Nitrogen 6 (L) 7 - 30 mg/dL    Creatinine 0.64 0.52 - 1.04 mg/dL    GFR Estimate >90 >60 mL/min/1.7m2    GFR Estimate If Black >90 >60 mL/min/1.7m2    Calcium 8.2 (L) 8.5 - 10.1 mg/dL   HCG QUANTitative pregnancy   Result Value Ref Range    HCG Quantitative Serum 7372 (H) 0 - 5 IU/L   UA with Microscopic reflex to Culture   Result Value Ref Range    Color Urine Light Red     Appearance Urine Clear     Glucose Urine Negative NEG^Negative mg/dL    Bilirubin Urine Negative NEG^Negative    Ketones Urine Negative NEG^Negative mg/dL    Specific Gravity Urine 1.008 1.003 - 1.035    Blood Urine Moderate (A) NEG^Negative    pH Urine 7.0 5.0 - 7.0 pH    Protein Albumin Urine 10 (A) NEG^Negative mg/dL    Urobilinogen mg/dL Normal 0.0 - 2.0 mg/dL    Nitrite Urine Negative NEG^Negative    Leukocyte Esterase Urine Trace (A) NEG^Negative    Source  Unspecified Urine     WBC Urine 6 (H) 0 - 2 /HPF    RBC Urine >182 (H) 0 - 2 /HPF    Bacteria Urine Few (A) NEG^Negative /HPF    Squamous Epithelial /HPF Urine 1 0 - 1 /HPF    Mucous Urine Present (A) NEG^Negative /LPF   hCG qual urine POCT   Result Value Ref Range    HCG Qual Urine Positive neg    Internal QC OK Yes    Rho (D) immune globulin (RhoGam) Lab Study   Result Value Ref Range    Rhogam Order Order received    Rh Immune Globulin Study   Result Value Ref Range    ABO O     RH(D) Pos     Fetal Blood Screen Canceled, Test credited     Blood Bank Comment       Not suitable for Rh Immune Globulin  Patient is Rh Positive      Amount of RHIG Required Not suitable for Rh Immune Globulin      Medications   Blood Bank will determine if patient is eligible for and the proper dosage of Rho (D) immune globulin (RhoGam) (not administered)   NO Rho (D)  immune globulin (RhoGam) needed  - mother INELIGIBLE (not administered)            Assessments & Plan (with Medical Decision Making)   This is a 37-year-old female patient who is 7 weeks pregnant presenting with a single episode of vaginal bleeding with some mild abdominal cramps.  All others are reviewed which occurs unremarkable.  Ultrasound was unremarkable with a intrauterine pregnancy with good heart tones.  At this time the patient is otherwise asymptomatic.  She refused a vaginal exam because the provider was not female.  At this time I believe she can be safely discharged with close follow-up by her ObGyn.  She is recommended to take it easy and not to to mention this activity.  If she has any further concerns she should return to the emergency room or follow-up with her doctor as soon as possible.    I have reviewed the nursing notes.    I have reviewed the findings, diagnosis, plan and need for follow up with the patient.    Discharge Medication List as of 8/28/2017 10:36 PM          Final diagnoses:   Vaginal bleeding in pregnancy, first trimester     I,  Quinn Ji, am serving as a trained medical scribe to document services personally performed by Calin Bartlett MD, based on the provider's statements to me.   I, Calin Bartlett MD, was physically present and have reviewed and verified the accuracy of this note documented by Quinn Ji.     8/28/2017   [facility], [place], EMERGENCY DEPARTMENT     Calin Bartlett MD  08/28/17 9014

## 2017-08-30 ENCOUNTER — HOSPITAL ENCOUNTER (EMERGENCY)
Facility: CLINIC | Age: 37
Discharge: HOME | End: 2017-08-30

## 2017-08-30 ENCOUNTER — NURSE TRIAGE (OUTPATIENT)
Dept: NURSING | Facility: CLINIC | Age: 37
End: 2017-08-30

## 2017-08-30 ENCOUNTER — OFFICE VISIT (OUTPATIENT)
Dept: OBGYN | Facility: CLINIC | Age: 37
End: 2017-08-30
Payer: COMMERCIAL

## 2017-08-30 ENCOUNTER — TELEPHONE (OUTPATIENT)
Dept: OBGYN | Facility: CLINIC | Age: 37
End: 2017-08-30

## 2017-08-30 VITALS
SYSTOLIC BLOOD PRESSURE: 101 MMHG | HEIGHT: 64 IN | HEART RATE: 67 BPM | WEIGHT: 187.4 LBS | DIASTOLIC BLOOD PRESSURE: 67 MMHG | BODY MASS INDEX: 31.99 KG/M2

## 2017-08-30 DIAGNOSIS — Z3A.01 PREGNANCY WITH 7 COMPLETED WEEKS GESTATION: Primary | ICD-10-CM

## 2017-08-30 RX ORDER — VITAMIN A, ASCORBIC ACID, CHOLECALCIFEROL, .ALPHA.-TOCOPHEROL ACETATE, DL-, THIAMINE MONONITRATE, RIBOFLAVIN, NIACINAMIDE, PYRIDOXINE HYDROCHLORIDE, FOLIC ACID, CYANOCOBALAMIN, CALCIUM CARBONATE, IRON, ZINC OXIDE, AND CUPRIC OXIDE 4000; 120; 400; 22; 1.84; 3; 20; 10; 1; 12; 200; 29; 25; 2 [IU]/1; MG/1; [IU]/1; [IU]/1; MG/1; MG/1; MG/1; MG/1; MG/1; UG/1; MG/1; MG/1; MG/1; MG/1
1 TABLET ORAL DAILY
Qty: 90 TABLET | Refills: 3 | Status: SHIPPED | OUTPATIENT
Start: 2017-08-30 | End: 2018-10-05

## 2017-08-30 RX ORDER — VITAMIN A, ASCORBIC ACID, CHOLECALCIFEROL, .ALPHA.-TOCOPHEROL ACETATE, DL-, THIAMINE MONONITRATE, RIBOFLAVIN, NIACINAMIDE, PYRIDOXINE HYDROCHLORIDE, FOLIC ACID, CYANOCOBALAMIN, CALCIUM CARBONATE, IRON, ZINC OXIDE, AND CUPRIC OXIDE 4000; 120; 400; 22; 1.84; 3; 20; 10; 1; 12; 200; 29; 25; 2 [IU]/1; MG/1; [IU]/1; [IU]/1; MG/1; MG/1; MG/1; MG/1; MG/1; UG/1; MG/1; MG/1; MG/1; MG/1
1 TABLET ORAL DAILY
COMMUNITY
End: 2017-08-30

## 2017-08-30 ASSESSMENT — PAIN SCALES - GENERAL: PAINLEVEL: NO PAIN (0)

## 2017-08-30 NOTE — TELEPHONE ENCOUNTER
Reason for Disposition    [1] MODERATE vaginal bleeding (i.e., soaking 1 pad / hour; clots) AND [2] pregnant > 12 weeks    Additional Information    Negative: Passed out (i.e., lost consciousness, collapsed and was not responding)    Negative: Shock suspected (e.g., cold/pale/clammy skin, too weak to stand, low BP, rapid pulse)    Negative: Difficult to awaken or acting confused  (e.g., disoriented, slurred speech)    Negative: Sounds like a life-threatening emergency to the triager    Negative: Followed an abdomen (stomach) injury    Negative: [1] Abdominal pain AND [2] pregnant > 20 weeks    Protocols used: PREGNANCY - ABDOMINAL PAIN LESS THAN 20 WEEKS EGA-ADULT-  Tanya was into ED and has an appointment on 9/5/17.  Today is bleeding vaginally.  FNA advised  To go to ED and Tanya is wanting to schedule appointment at clinic.

## 2017-08-30 NOTE — PATIENT INSTRUCTIONS
- Come back on 9/5 for your appointment and ultrasound  - If you have very heavy bleeding (soaking more than 2 pads over an hour, or 1 pad in an hour for several hours in a row), come to clinic or the ED if it is after hours    Preeti Maciel MD

## 2017-08-30 NOTE — MR AVS SNAPSHOT
"              After Visit Summary   8/30/2017    Tanya Denny    MRN: [id number]           Patient Information     Date Of Birth          1980        Visit Information        Provider Department      8/30/2017 1:30 PM Preeti Maciel MD [facility]        Today's Diagnoses     Pregnancy with 7 completed weeks gestation    -  1      Care Instructions    - Come back on 9/5 for your appointment and ultrasound  - If you have very heavy bleeding (soaking more than 2 pads over an hour, or 1 pad in an hour for several hours in a row), come to clinic or the ED if it is after hours    Preeti Maciel MD          Follow-ups after your visit        Follow-up notes from your care team     Return in 6 days (on 9/5/2017) for Routine Visit.      Your next 10 appointments already scheduled     Sep 05, 2017  3:00 PM CDT   ULTRASOUND with [facility] ULTRASOUND   [facility] ([facility])    [facility] [address]  [address]  [facility] [address]   [phone]            Sep 05, 2017  3:30 PM CDT   OB INTAKE with TAMRA Berrios CNM   [facility] ([facility])    [facility] [address]  [address]  [facility] [id number] [phone]           Congratulations! You're Pregnant.  At [facility] we think of you as part of our team, working together toward a successful pregnancy and a healthy baby.  You might already have questions about all the different things that are happening to your body.  We have attached a link to our book \"The Expectant Family- From Pregnancy through Childbirth\" http://[email or web address]/812451.pdf to help answer some of those concerns. (You will be given a hard copy at your first visit in clinic)  Chapter 2 (page 20) is a must read- from questions about morning sickness, headaches, warning signs to look for, to:\"why do I have to go to " "the bathroom so often?\"   Our Doctors, Resident Physicians, Certified nurse midwives do work closely together as a team both in clinic and in the hospital to make this experience remarkable. Our patients have on numerous occasions expressed their satisfaction in knowing that there is always a provider at the hospital or on the phone no matter what time of the day it is, to talk, triage or deliver their babies.  Your time is very important to us, so we will like you to know what to expect.  Routine Prenatal Visit Schedule:  Visit 1: 8 Weeks - Dating Ultrasound and Intake with Nurse  Visit 2: 10-12 Weeks- First Prenatal Visit & Exam with Midwife or Resident Physician, scheduling for early genetic screening at [facility] (optional)  Visit 3: 16-18 Weeks 18-20 Weeks- Level II Ultrasound done at [facility]- 4th Floor  Visit 4: 22-24 weeks  Visit 5: 28 Weeks- Extended Education Visit with Midwife or Resident Physician  Visit 6: 32 Weeks  Visit 7: 36 Weeks  Visit 8-11: 38-41 Weeks (Weekly Visits)  Changes can or may occur during your pregnancy. Your provider may ask you to come in more often for testing or monitoring frequently than stated above.  If you still have questions our triage nurses will be more than welcome to help you. Send us a message via MY Chart, our secure health Portal or give us a call anytime at [phone].  Thank You for allowing us to be part of your care.  Yours sincerely,  [facility]              Future tests that were ordered for you today     Open Future Orders        Priority Expected Expires Ordered    Dating ultrasound less than 14 weeks gestation Transvag  - 40688 (In Clinic) Routine  12/28/2017 8/30/2017            Who to contact     Please call your clinic at [phone] to:    Ask questions about your health    Make or cancel appointments    Discuss your medicines    Learn about your test results    Speak to your doctor   If you have compliments or concerns " "about an experience at your clinic, or if you wish to file a complaint, please contact [facility] Patient Relations at [phone] or email us at [email or web address]         Additional Information About Your Visit        Brand Thunderhart Information     [employer] gives you secure access to your electronic health record. If you see a primary care provider, you can also send messages to your care team and make appointments. If you have questions, please call your primary care clinic.  If you do not have a primary care provider, please call [phone] and they will assist you.      N[employer] is an electronic gateway that provides easy, online access to your medical records. With [employer], you can request a clinic appointment, read your test results, renew a prescription or communicate with your care team.     To access your existing account, please contact your [facility] or call [phone] for assistance.        Care EveryWhere ID     This is your Care EveryWhere ID. This could be used by other organizations to access your [facility] medical records  [id number]        Your Vitals Were     Pulse Height Last Period Breastfeeding? BMI (Body Mass Index)       67 1.626 m (5' 4\") 07/08/2017 (Exact Date) No 32.17 kg/m2        Blood Pressure from Last 3 Encounters:   08/30/17 101/67   08/28/17 117/72   08/23/17 106/71    Weight from Last 3 Encounters:   08/30/17 85 kg (187 lb 6.4 oz)   08/28/17 86.2 kg (190 lb)   08/23/17 86.2 kg (190 lb)                 Today's Medication Changes          These changes are accurate as of: 8/30/17  2:18 PM.  If you have any questions, ask your nurse or doctor.               Stop taking these medicines if you haven't already. Please contact your care team if you have questions.     clarithromycin 500 MG tablet   Commonly known as:  BIAXIN   Stopped by:  Preeti Maciel MD           Prenatal Vitamins 28-0.8 MG Tabs   Stopped by:  Raheem, " MD Preeti                Where to get your medicines      These medications were sent to [facility]/pharmacy #[id number] - [place], [place] - [address]  [address], [place] [address]     Phone:  [phone]     PRENATAL PLUS IRON 29-1 MG Tabs                Primary Care Provider    Pcp Unknown Verified       No address on file        Equal Access to Services     [facility]: Hadii aad ku hadasho Soomaali, waaxda luqadaha, qaybta kaalmada adeegyada, sherri diaz . So [facility] [phone].    ATENCIÓN: Si habla español, tiene a bautista disposición servicios gratuitos de asistencia lingüística. Llame al [phone].    We comply with applicable federal civil rights laws and [place] laws. We do not discriminate on the basis of race, color, national origin, age, disability sex, sexual orientation or gender identity.            Thank you!     Thank you for choosing [facility]  for your care. Our goal is always to provide you with excellent care. Hearing back from our patients is one way we can continue to improve our services. Please take a few minutes to complete the written survey that you may receive in the mail after your visit with us. Thank you!             Your Updated Medication List - Protect others around you: Learn how to safely use, store and throw away your medicines at www.disposemymeds.org.          This list is accurate as of: 8/30/17  2:18 PM.  Always use your most recent med list.                   Brand Name Dispense Instructions for use Diagnosis    PRENATAL PLUS IRON 29-1 MG Tabs     90 tablet    Take 1 tablet by mouth daily    Pregnancy with 7 completed weeks gestation       terconazole 0.4 % cream    TERAZOL 7    45 g    Place 1 applicator vaginally At Bedtime For 7 nights    Yeast infection of the vagina

## 2017-08-30 NOTE — TELEPHONE ENCOUNTER
Spoke with pharmacist who asked if prenatal vitamin can be changed to 29-1mg/tab as that is what they have in stock for prenatal with no gelatin. Nurse gave verbal approval.

## 2017-08-31 ENCOUNTER — TELEPHONE (OUTPATIENT)
Dept: OBGYN | Facility: CLINIC | Age: 37
End: 2017-08-31

## 2017-08-31 RX ORDER — NICOTINE POLACRILEX 4 MG/1
GUM, CHEWING ORAL
Qty: 28 TABLET | Refills: 0 | OUTPATIENT
Start: 2017-08-31

## 2017-08-31 NOTE — PROGRESS NOTES
"Clinic visit  2017    CC: vaginal bleeding at     HPI: Ms Hillman is a 37 year old  at 7w4d by 6w6d US consistent with LMP who presents to clinic to follow up spotting. She presented to the ED because of spotting on the evening of ; it started that afternoon. It has been minimal like a light period and continues. Never soaking pads.     She denies cramping, fevers, chills, foul smelling discharge. This was a desired pregnancy.     O:   Patient Vitals for the past 8 hrs:   BP Pulse Height Weight   17 1336 101/67 67 1.626 m (5' 4\") 85 kg (187 lb 6.4 oz)     Gen: appears well, NAD  US transabdominal: gestational sac, possible flicker of heartbeat but unable to completely visualize.     A/P  37 year old  ay 7w4d by 6w6d US consistent with LMP presenting to clinic to discuss bloody spotting and pregnancy viability.     Ordered TVUS to confirm viability as we cannot do so in clinic today. She will return at the time of her scheduled visit on  to have this performed.     We discussed that early pregnancy loss cannot be prevented. This could be a miscarriage, subchorionic hematoma or simply implantation bleeding. Also discussed warning signs like heavy bleeding, fevers, chills.     Preeti Maciel, PGY3  OB/Gyn  2017, 8:26 PM    I agree with note as above.  Assessment and plan were jointly made. Present in room for bedside ultrasound. Visualization is limited.  Maryjo Terry MD    "

## 2017-08-31 NOTE — TELEPHONE ENCOUNTER
Spoke to Tanya who was seen in clinic yesterday. She had her second HCG ordered and instructed to get yesterday but didn't get it.  She wanted to be seen, but reports no changes. Denies heavy bleeding or severe pain . She is scheduled for US and OB intake Tuesday.    Instructed for her to get lab work today.  Reinterated to call if soaking thru maxi pad in an hour and happens twice, and if after hours go to ED Pt indicated understanding and agreed with plan.

## 2017-08-31 NOTE — TELEPHONE ENCOUNTER
----- Message from Jeremy Spicernick sent at 8/31/2017  8:55 AM CDT -----  Regarding: OB PT was seen in ER  Contact: [phone]  PT was seen at [facility] on 8/28/17 for vaginal bleeding.  PT is pregnant and needs to make a follow up appointment.  PT is at work and will be available to receive a phone call around 1:00pm today and the best call back number is [phone].     Thank you,  Jeremy    [facility]

## 2017-09-20 ENCOUNTER — ALLIED HEALTH/NURSE VISIT (OUTPATIENT)
Dept: NURSING | Facility: CLINIC | Age: 37
End: 2017-09-20

## 2017-09-20 ENCOUNTER — TELEPHONE (OUTPATIENT)
Dept: FAMILY MEDICINE | Facility: CLINIC | Age: 37
End: 2017-09-20

## 2017-09-20 DIAGNOSIS — Z11.1 ENCOUNTER FOR TB TINE TEST: Primary | ICD-10-CM

## 2017-09-20 ASSESSMENT — PATIENT HEALTH QUESTIONNAIRE - PHQ9
5. POOR APPETITE OR OVEREATING: MORE THAN HALF THE DAYS
SUM OF ALL RESPONSES TO PHQ QUESTIONS 1-9: 14

## 2017-09-20 ASSESSMENT — ANXIETY QUESTIONNAIRES
7. FEELING AFRAID AS IF SOMETHING AWFUL MIGHT HAPPEN: MORE THAN HALF THE DAYS
GAD7 TOTAL SCORE: 14
1. FEELING NERVOUS, ANXIOUS, OR ON EDGE: MORE THAN HALF THE DAYS
3. WORRYING TOO MUCH ABOUT DIFFERENT THINGS: NEARLY EVERY DAY
IF YOU CHECKED OFF ANY PROBLEMS ON THIS QUESTIONNAIRE, HOW DIFFICULT HAVE THESE PROBLEMS MADE IT FOR YOU TO DO YOUR WORK, TAKE CARE OF THINGS AT HOME, OR GET ALONG WITH OTHER PEOPLE: EXTREMELY DIFFICULT
5. BEING SO RESTLESS THAT IT IS HARD TO SIT STILL: SEVERAL DAYS
6. BECOMING EASILY ANNOYED OR IRRITABLE: NEARLY EVERY DAY
2. NOT BEING ABLE TO STOP OR CONTROL WORRYING: SEVERAL DAYS

## 2017-09-20 NOTE — TELEPHONE ENCOUNTER
"Patient in office to have injection and PHQ 9 done with ancillary. Patient answered YES to thoughts being better off dead or of hurting herself. Called patient to triage symptoms and patient states that she does not have thoughts of harming herself or she would be better off dead. Patient states that she circled a \"1\" in error and it should be \"0\". Noted on PHQ 9 form.  Does not appear patients PCP is [facility] will route to PCP.  Mckenzie Waldrop RN.     "

## 2017-09-21 ASSESSMENT — ANXIETY QUESTIONNAIRES: GAD7 TOTAL SCORE: 14

## 2017-11-10 ENCOUNTER — OFFICE VISIT (OUTPATIENT)
Dept: INTERNAL MEDICINE | Facility: CLINIC | Age: 37
End: 2017-11-10
Attending: INTERNAL MEDICINE
Payer: COMMERCIAL

## 2017-11-10 VITALS
SYSTOLIC BLOOD PRESSURE: 119 MMHG | HEART RATE: 77 BPM | DIASTOLIC BLOOD PRESSURE: 86 MMHG | WEIGHT: 187 LBS | BODY MASS INDEX: 32.1 KG/M2

## 2017-11-10 DIAGNOSIS — G89.29 CHRONIC MIDLINE LOW BACK PAIN WITHOUT SCIATICA: Primary | ICD-10-CM

## 2017-11-10 DIAGNOSIS — M54.50 CHRONIC MIDLINE LOW BACK PAIN WITHOUT SCIATICA: Primary | ICD-10-CM

## 2017-11-10 PROCEDURE — 99212 OFFICE O/P EST SF 10 MIN: CPT | Mod: ZF

## 2017-11-10 ASSESSMENT — PAIN SCALES - GENERAL: PAINLEVEL: EXTREME PAIN (8)

## 2017-11-10 NOTE — PROGRESS NOTES
SUBJECTIVE:  Tanya Denny is a 37 year old female who comes in for back pain. The pain is midline and radiating to between her shoulder blades. Occasionally her legs feel heavy. No bowel/bladder dysfunction. No known injuries. Has tried stopping work but this did not help. Has previously seen PT and had great relief of symptoms. Would like to have a new order for PT.       Medications and allergies reviewed by me today.     ROS:   Constitutional, HEENT, cardiovascular, pulmonary, gi and gu systems are negative, except as otherwise noted.      OBJECTIVE:  /86  Pulse 77  Wt 84.8 kg (187 lb)  LMP 10/11/2017  Breastfeeding? No  BMI 32.1 kg/m2   Wt Readings from Last 1 Encounters:   11/10/17 84.8 kg (187 lb)     Gen: Pleasant female, in NAD  Back: Pain with lumbar flexion/extension, R lateral rotation; no pain with R/L lateral flexion or L Lateral rotation. Tender to palpation over lower lumbar spine. No SI tenderness; SLR neg bilaterally  Neuro: 5/5 strength in LE bilaterally, gait normal       ASSESSMENT/PLAN:    Tanya was seen today for recheck.    Diagnoses and all orders for this visit:    Chronic midline low back pain without sciatica  -     PHYSICAL THERAPY REFERRAL       Pt should return to clinic for f/u with me in PRN      Darcie Jay MD  11/10/17

## 2017-11-10 NOTE — MR AVS SNAPSHOT
"              After Visit Summary   11/10/2017    Tanya Denny    MRN: [id number]           Patient Information     Date Of Birth          1980        Visit Information        Provider Department      11/10/2017 10:30 AM Darcie Ashby MD [facility]         Today's Diagnoses     Chronic midline low back pain without sciatica    -  1       Follow-ups after your visit        Additional Services     PHYSICAL THERAPY REFERRAL       *This therapy referral will be filtered to a centralized scheduling office at [facility] and the patient will receive a call to schedule an appointment at a [facility] location most convenient for them. *     [facility] provides Physical Therapy evaluation and treatment and many specialty services across the [facility] system.  If requesting a specialty program, please choose from the list below.    If you have not heard from the scheduling office within 2 business days, please call [phone] for all locations, with the exception of [place], please call [phone].  Treatment: Evaluation & Treatment  Special Instructions/Modalities: as indicated  Special Programs: None    Please be aware that coverage of these services is subject to the terms and limitations of your health insurance plan.  Call member services at your health plan with any benefit or coverage questions.      **Note to Provider:  If you are referring outside of [facility] for the therapy appointment, please list the name of the location in the \"special instructions\" above, print the referral and give to the patient to schedule the appointment.                  Who to contact     Please call your clinic at [phone] to:    Ask questions about your health    Make or cancel appointments    Discuss your medicines    Learn about your test results    Speak to your doctor   If you have compliments or concerns about an experience at your " clinic, or if you wish to file a complaint, please contact [facility] Patient Relations at [phone] or email us at [email or web address]         Additional Information About Your Visit        [facility] Information     [employer] gives you secure access to your electronic health record. If you see a primary care provider, you can also send messages to your care team and make appointments. If you have questions, please call your primary care clinic.  If you do not have a primary care provider, please call [phone] and they will assist you.      [employer] is an electronic gateway that provides easy, online access to your medical records. With [employer], you can request a clinic appointment, read your test results, renew a prescription or communicate with your care team.     To access your existing account, please contact your [facility] Clinic or call [phone] for assistance.        Care EveryWhere ID     This is your Care EveryWhere ID. This could be used by other organizations to access your [facility] medical records  [id number]        Your Vitals Were     Pulse Last Period Breastfeeding? BMI (Body Mass Index)          77 10/11/2017 No 32.1 kg/m2         Blood Pressure from Last 3 Encounters:   11/10/17 119/86   08/30/17 101/67   08/28/17 117/72    Weight from Last 3 Encounters:   11/10/17 84.8 kg (187 lb)   08/30/17 85 kg (187 lb 6.4 oz)   08/28/17 86.2 kg (190 lb)              We Performed the Following     PHYSICAL THERAPY REFERRAL        Primary Care Provider Office Phone # Fax #    Amira Michael Early [phone]       [facility] [address]  [facility] [address]        Equal Access to Services     [facility]: Hadii rush Anguiano, refugio dooley, sherri ackerman . So [facility] [phone].    ATENCIÓN: Si habla español, tiene a bautista disposición servicios  silvana de asistencia lingüística. Manish melgar [phone].    We comply with applicable federal civil rights laws and [place] laws. We do not discriminate on the basis of race, color, national origin, age, disability, sex, sexual orientation, or gender identity.            Thank you!     Thank you for choosing [facility]   for your care. Our goal is always to provide you with excellent care. Hearing back from our patients is one way we can continue to improve our services. Please take a few minutes to complete the written survey that you may receive in the mail after your visit with us. Thank you!             Your Updated Medication List - Protect others around you: Learn how to safely use, store and throw away your medicines at www.disposemymeds.org.          This list is accurate as of: 11/10/17 11:51 AM.  Always use your most recent med list.                   Brand Name Dispense Instructions for use Diagnosis    PRENATAL PLUS IRON 29-1 MG Tabs     90 tablet    Take 1 tablet by mouth daily    Pregnancy with 7 completed weeks gestation       terconazole 0.4 % cream    TERAZOL 7    45 g    Place 1 applicator vaginally At Bedtime For 7 nights    Yeast infection of the vagina

## 2017-12-22 ENCOUNTER — OFFICE VISIT (OUTPATIENT)
Dept: INTERNAL MEDICINE | Facility: CLINIC | Age: 37
End: 2017-12-22
Attending: INTERNAL MEDICINE
Payer: COMMERCIAL

## 2017-12-22 ENCOUNTER — TELEPHONE (OUTPATIENT)
Dept: OBGYN | Facility: CLINIC | Age: 37
End: 2017-12-22

## 2017-12-22 VITALS
BODY MASS INDEX: 32.32 KG/M2 | HEART RATE: 68 BPM | DIASTOLIC BLOOD PRESSURE: 75 MMHG | SYSTOLIC BLOOD PRESSURE: 113 MMHG | WEIGHT: 189.3 LBS | HEIGHT: 64 IN

## 2017-12-22 DIAGNOSIS — R53.83 FATIGUE, UNSPECIFIED TYPE: Primary | ICD-10-CM

## 2017-12-22 DIAGNOSIS — D50.9 IRON DEFICIENCY ANEMIA, UNSPECIFIED IRON DEFICIENCY ANEMIA TYPE: ICD-10-CM

## 2017-12-22 DIAGNOSIS — R63.5 WEIGHT GAIN: ICD-10-CM

## 2017-12-22 DIAGNOSIS — R53.83 FATIGUE, UNSPECIFIED TYPE: ICD-10-CM

## 2017-12-22 DIAGNOSIS — R63.0 DECREASED APPETITE: ICD-10-CM

## 2017-12-22 DIAGNOSIS — K21.9 GASTROESOPHAGEAL REFLUX DISEASE, ESOPHAGITIS PRESENCE NOT SPECIFIED: ICD-10-CM

## 2017-12-22 DIAGNOSIS — R52 MILD PAIN: Primary | ICD-10-CM

## 2017-12-22 LAB
ALBUMIN SERPL-MCNC: 3.3 G/DL (ref 3.4–5)
ALP SERPL-CCNC: 49 U/L (ref 40–150)
ALT SERPL W P-5'-P-CCNC: 24 U/L (ref 0–50)
ANION GAP SERPL CALCULATED.3IONS-SCNC: 5 MMOL/L (ref 3–14)
AST SERPL W P-5'-P-CCNC: 25 U/L (ref 0–45)
BASOPHILS # BLD AUTO: 0 10E9/L (ref 0–0.2)
BASOPHILS NFR BLD AUTO: 0 %
BILIRUB SERPL-MCNC: 0.3 MG/DL (ref 0.2–1.3)
BUN SERPL-MCNC: 11 MG/DL (ref 7–30)
CALCIUM SERPL-MCNC: 8.2 MG/DL (ref 8.5–10.1)
CHLORIDE SERPL-SCNC: 108 MMOL/L (ref 94–109)
CO2 SERPL-SCNC: 29 MMOL/L (ref 20–32)
CREAT SERPL-MCNC: 0.73 MG/DL (ref 0.52–1.04)
DEPRECATED CALCIDIOL+CALCIFEROL SERPL-MC: 16 UG/L (ref 20–75)
DIFFERENTIAL METHOD BLD: ABNORMAL
EOSINOPHIL # BLD AUTO: 0.1 10E9/L (ref 0–0.7)
EOSINOPHIL NFR BLD AUTO: 2 %
ERYTHROCYTE [DISTWIDTH] IN BLOOD BY AUTOMATED COUNT: 12.9 % (ref 10–15)
FERRITIN SERPL-MCNC: 7 NG/ML (ref 12–150)
GFR SERPL CREATININE-BSD FRML MDRD: >90 ML/MIN/1.7M2
GLUCOSE SERPL-MCNC: 83 MG/DL (ref 70–99)
HBA1C MFR BLD: 5.2 % (ref 4.3–6)
HCT VFR BLD AUTO: 33.8 % (ref 35–47)
HGB BLD-MCNC: 11 G/DL (ref 11.7–15.7)
IMM GRANULOCYTES # BLD: 0 10E9/L (ref 0–0.4)
IMM GRANULOCYTES NFR BLD: 0 %
IRON SATN MFR SERPL: 10 % (ref 15–46)
IRON SERPL-MCNC: 33 UG/DL (ref 35–180)
LYMPHOCYTES # BLD AUTO: 1.6 10E9/L (ref 0.8–5.3)
LYMPHOCYTES NFR BLD AUTO: 35.5 %
MCH RBC QN AUTO: 27 PG (ref 26.5–33)
MCHC RBC AUTO-ENTMCNC: 32.5 G/DL (ref 31.5–36.5)
MCV RBC AUTO: 83 FL (ref 78–100)
MONOCYTES # BLD AUTO: 0.3 10E9/L (ref 0–1.3)
MONOCYTES NFR BLD AUTO: 7.1 %
NEUTROPHILS # BLD AUTO: 2.5 10E9/L (ref 1.6–8.3)
NEUTROPHILS NFR BLD AUTO: 55.4 %
NRBC # BLD AUTO: 0 10*3/UL
NRBC BLD AUTO-RTO: 0 /100
PLATELET # BLD AUTO: 219 10E9/L (ref 150–450)
POTASSIUM SERPL-SCNC: 4.1 MMOL/L (ref 3.4–5.3)
PROT SERPL-MCNC: 7 G/DL (ref 6.8–8.8)
RBC # BLD AUTO: 4.07 10E12/L (ref 3.8–5.2)
SODIUM SERPL-SCNC: 142 MMOL/L (ref 133–144)
TIBC SERPL-MCNC: 333 UG/DL (ref 240–430)
TSH SERPL DL<=0.005 MIU/L-ACNC: 1.49 MU/L (ref 0.4–4)
WBC # BLD AUTO: 4.5 10E9/L (ref 4–11)

## 2017-12-22 PROCEDURE — 83550 IRON BINDING TEST: CPT | Performed by: INTERNAL MEDICINE

## 2017-12-22 PROCEDURE — 83540 ASSAY OF IRON: CPT | Performed by: INTERNAL MEDICINE

## 2017-12-22 PROCEDURE — 99211 OFF/OP EST MAY X REQ PHY/QHP: CPT | Mod: ZF

## 2017-12-22 PROCEDURE — 82306 VITAMIN D 25 HYDROXY: CPT | Performed by: INTERNAL MEDICINE

## 2017-12-22 PROCEDURE — 83036 HEMOGLOBIN GLYCOSYLATED A1C: CPT | Performed by: INTERNAL MEDICINE

## 2017-12-22 PROCEDURE — 36415 COLL VENOUS BLD VENIPUNCTURE: CPT | Performed by: INTERNAL MEDICINE

## 2017-12-22 PROCEDURE — 84443 ASSAY THYROID STIM HORMONE: CPT | Performed by: INTERNAL MEDICINE

## 2017-12-22 PROCEDURE — 85025 COMPLETE CBC W/AUTO DIFF WBC: CPT | Performed by: INTERNAL MEDICINE

## 2017-12-22 PROCEDURE — 82728 ASSAY OF FERRITIN: CPT | Performed by: INTERNAL MEDICINE

## 2017-12-22 PROCEDURE — 80053 COMPREHEN METABOLIC PANEL: CPT | Performed by: INTERNAL MEDICINE

## 2017-12-22 RX ORDER — ACETAMINOPHEN 325 MG/1
650 TABLET ORAL EVERY 4 HOURS PRN
Qty: 100 TABLET | Refills: 0 | Status: SHIPPED | OUTPATIENT
Start: 2017-12-22 | End: 2020-06-03

## 2017-12-22 RX ORDER — FERROUS SULFATE 325(65) MG
325 TABLET ORAL
Qty: 90 TABLET | Refills: 2 | Status: SHIPPED | OUTPATIENT
Start: 2017-12-22 | End: 2020-06-03

## 2017-12-22 RX ORDER — NICOTINE POLACRILEX 4 MG/1
20 GUM, CHEWING ORAL DAILY
Qty: 30 TABLET | Refills: 3 | Status: SHIPPED | OUTPATIENT
Start: 2017-12-22 | End: 2020-06-03

## 2017-12-22 RX ORDER — NICOTINE POLACRILEX 4 MG/1
20 GUM, CHEWING ORAL DAILY
Refills: 3 | COMMUNITY
Start: 2017-12-11 | End: 2017-12-22

## 2017-12-22 ASSESSMENT — ANXIETY QUESTIONNAIRES
GAD7 TOTAL SCORE: 0
3. WORRYING TOO MUCH ABOUT DIFFERENT THINGS: NOT AT ALL
2. NOT BEING ABLE TO STOP OR CONTROL WORRYING: NOT AT ALL
1. FEELING NERVOUS, ANXIOUS, OR ON EDGE: NOT AT ALL
6. BECOMING EASILY ANNOYED OR IRRITABLE: NOT AT ALL
7. FEELING AFRAID AS IF SOMETHING AWFUL MIGHT HAPPEN: NOT AT ALL
IF YOU CHECKED OFF ANY PROBLEMS ON THIS QUESTIONNAIRE, HOW DIFFICULT HAVE THESE PROBLEMS MADE IT FOR YOU TO DO YOUR WORK, TAKE CARE OF THINGS AT HOME, OR GET ALONG WITH OTHER PEOPLE: NOT DIFFICULT AT ALL
5. BEING SO RESTLESS THAT IT IS HARD TO SIT STILL: NOT AT ALL

## 2017-12-22 ASSESSMENT — PATIENT HEALTH QUESTIONNAIRE - PHQ9
SUM OF ALL RESPONSES TO PHQ QUESTIONS 1-9: 5
5. POOR APPETITE OR OVEREATING: NOT AT ALL

## 2017-12-22 ASSESSMENT — PAIN SCALES - GENERAL: PAINLEVEL: NO PAIN (0)

## 2017-12-22 NOTE — NURSING NOTE
Last two weeks feeling fatiged-    Feel tired all day  wakes up every night and unable to go back to sleep   Not eating well.

## 2017-12-22 NOTE — LETTER
"2017       RE: Tanya Denny  [address]  [place] [facility] [address]     Dear Colleague,    Thank you for referring your patient, Tanya Denny, to the [facility]  at [facility]. Please see a copy of my visit note below.                                SUBJECTIVE:  Tanya Denny is a 37 year old female who comes in for loss of appetite and fatigue.  She notes that she has not had any energy since her miscarriage earlier this .  She notes that she will go to work and will have no energy when she returns home.  Sometimes she will take a nap after she gets home, but if she takes a nap, she will be able to sleep well at night.  Otherwise she goes to bed at about 8 PM at night and she gets about 8 hours of sleep.  She also has not had an appetite since the miscarriage.  She notes that foods that normally tastes good to her does not sound appealing at this time.  She also states that when she gets her period, she feels like she wants to vomit for the first 2-3 days and she will also have headaches.  She has had this before from about 3545-8454, but this did resolve on its own.    When asked about her mood, she feels that she is doing okay.  She denies any signs of depression.  She   feels very happy with her family.  She knows that she is doing well after her miscarriage.    No diarrhea/constipation. No fevers/chills. No cold/hot intolerance. No chest pain or SOB.     Past Medical History:   Diagnosis Date     SAB (spontaneous )     1ST TRIMESTER       Medications and allergies reviewed by me today.     ROS:   Constitutional, HEENT, cardiovascular, pulmonary, gi and gu systems are negative, except as otherwise noted.      OBJECTIVE:  /75  Pulse 68  Ht 1.626 m (5' 4.02\")  Wt 85.9 kg (189 lb 4.8 oz)  LMP 12/15/2017  BMI 32.48 kg/m2   Wt Readings from Last 1 Encounters:   17 85.9 kg (189 lb 4.8 " oz)     General: Pleasant female, in no apparent distress  ENT: Oropharynx clear, moist mucous membranes  Neck: No lymphadenopathy, no thyromegaly  Respiratory: Lungs clear to auscultation bilaterally  Cardiovascular: Heart regular rate and rhythm, no murmurs rubs or gallops  Abdomen: Soft, nontender, nondistended, normal bowel sounds  Skin: Warm, dry, no rash  Extremities: Warm well perfused, no lower extremity edema  Neuro: Alert and oriented ×3 no focal deficits     norma 7 and PHQ 9 are 0 today         ASSESSMENT/PLAN:    Tanya was seen today for recheck.  I am a little concerned that her symptoms might be related to depression, given the timing of onset following the miscarriage.  However she denies having any sort of depression.  I will go ahead and check her thyroid, CBC, vitamin D, CMP, and iron studies.  She also would like to have an A1c checked because she has a family history of diabetes, and this is reasonable.    Diagnoses and all orders for this visit:    Fatigue, unspecified type  Weight gain  Decreased appetite  -     TSH with free T4 reflex; Future  -     CBC with Platelets Differential; Future  -     25- OH-Vitamin D; Future  -     Comprehensive metabolic panel  -     Ferritin  -     Iron and iron binding capacity  -     Hemoglobin A1c; Future    Gastroesophageal reflux disease, esophagitis presence not specified  -     omeprazole 20 MG tablet; Take 1 tablet (20 mg) by mouth daily       Pt should return to clinic for f/u with me in PRN      Darcie Jay MD  12/22/17

## 2017-12-22 NOTE — MR AVS SNAPSHOT
After Visit Summary   12/22/2017    Tanya Denny    MRN: [id number]           Patient Information     Date Of Birth          1980        Visit Information        Provider Department      12/22/2017 7:00 AM Darcie Ashby MD [facility]         Today's Diagnoses     Fatigue, unspecified type    -  1    Weight gain        Decreased appetite        Gastroesophageal reflux disease, esophagitis presence not specified           Follow-ups after your visit        Who to contact     Please call your clinic at [phone] to:    Ask questions about your health    Make or cancel appointments    Discuss your medicines    Learn about your test results    Speak to your doctor   If you have compliments or concerns about an experience at your clinic, or if you wish to file a complaint, please contact [facility] Patient Relations at [phone] or email us at [email or web address]         Additional Information About Your Visit        MyChart Information     [employer] gives you secure access to your electronic health record. If you see a primary care provider, you can also send messages to your care team and make appointments. If you have questions, please call your primary care clinic.  If you do not have a primary care provider, please call [phone] and they will assist you.      [employer] is an electronic gateway that provides easy, online access to your medical records. With [employer], you can request a clinic appointment, read your test results, renew a prescription or communicate with your care team.     To access your existing account, please contact your [facility] or call [phone] for assistance.        Care EveryWhere ID     This is your Care EveryWhere ID. This could be used by other organizations to access your [facility] medical records  [id number]        Your Vitals Were     Pulse  "Height Last Period BMI (Body Mass Index)          68 1.626 m (5' 4.02\") 12/15/2017 32.48 kg/m2         Blood Pressure from Last 3 Encounters:   12/22/17 113/75   11/10/17 119/86   08/30/17 101/67    Weight from Last 3 Encounters:   12/22/17 85.9 kg (189 lb 4.8 oz)   11/10/17 84.8 kg (187 lb)   08/30/17 85 kg (187 lb 6.4 oz)              We Performed the Following     Comprehensive metabolic panel     Ferritin     Iron and iron binding capacity     TSH with free T4 reflex          Today's Medication Changes          These changes are accurate as of: 12/22/17  8:04 AM.  If you have any questions, ask your nurse or doctor.               These medicines have changed or have updated prescriptions.        Dose/Directions    omeprazole 20 MG tablet   This may have changed:  how to take this   Used for:  Gastroesophageal reflux disease, esophagitis presence not specified   Changed by:  Darcie Ashby MD        Dose:  20 mg   Take 1 tablet (20 mg) by mouth daily   Quantity:  30 tablet   Refills:  3         Stop taking these medicines if you haven't already. Please contact your care team if you have questions.     terconazole 0.4 % cream   Commonly known as:  TERAZOL 7   Stopped by:  Darcie Ashby MD                Where to get your medicines      These medications were sent to [facility]/pharmacy #[id number] - [place], [place] - [address]  [address] [facility] [address]     Phone:  [phone]     omeprazole 20 MG tablet                Primary Care Provider Office Phone # Fax #    Amira Michael Early [phone]       [facility] [address]  [place] [address]        Equal Access to Services     [facility] AH: Duane Anguiano, warachelda luqadaha, qaybta kaalmada charley, sherri jenkins. So [facility] [phone].    ATENCIÓN: Si habla español, tiene a bautista disposición servicios gratuitos de asistencia lingüística. " Manish melgar [phone].    We comply with applicable federal civil rights laws and [place] laws. We do not discriminate on the basis of race, color, national origin, age, disability, sex, sexual orientation, or gender identity.            Thank you!     Thank you for choosing [facility]   for your care. Our goal is always to provide you with excellent care. Hearing back from our patients is one way we can continue to improve our services. Please take a few minutes to complete the written survey that you may receive in the mail after your visit with us. Thank you!             Your Updated Medication List - Protect others around you: Learn how to safely use, store and throw away your medicines at www.disposemymeds.org.          This list is accurate as of: 12/22/17  8:04 AM.  Always use your most recent med list.                   Brand Name Dispense Instructions for use Diagnosis    omeprazole 20 MG tablet     30 tablet    Take 1 tablet (20 mg) by mouth daily    Gastroesophageal reflux disease, esophagitis presence not specified       PRENATAL PLUS IRON 29-1 MG Tabs     90 tablet    Take 1 tablet by mouth daily    Pregnancy with 7 completed weeks gestation

## 2017-12-22 NOTE — PROGRESS NOTES
"                            SUBJECTIVE:  Tanya Denny is a 37 year old female who comes in for loss of appetite and fatigue.  She notes that she has not had any energy since her miscarriage earlier this .  She notes that she will go to work and will have no energy when she returns home.  Sometimes she will take a nap after she gets home, but if she takes a nap, she will be able to sleep well at night.  Otherwise she goes to bed at about 8 PM at night and she gets about 8 hours of sleep.  She also has not had an appetite since the miscarriage.  She notes that foods that normally tastes good to her does not sound appealing at this time.  She also states that when she gets her period, she feels like she wants to vomit for the first 2-3 days and she will also have headaches.  She has had this before from about 7039-3648, but this did resolve on its own.    When asked about her mood, she feels that she is doing okay.  She denies any signs of depression.  She   feels very happy with her family.  She knows that she is doing well after her miscarriage.    No diarrhea/constipation. No fevers/chills. No cold/hot intolerance. No chest pain or SOB.     Past Medical History:   Diagnosis Date     SAB (spontaneous )     1ST TRIMESTER       Medications and allergies reviewed by me today.     ROS:   Constitutional, HEENT, cardiovascular, pulmonary, gi and gu systems are negative, except as otherwise noted.      OBJECTIVE:  /75  Pulse 68  Ht 1.626 m (5' 4.02\")  Wt 85.9 kg (189 lb 4.8 oz)  LMP 12/15/2017  BMI 32.48 kg/m2   Wt Readings from Last 1 Encounters:   17 85.9 kg (189 lb 4.8 oz)     General: Pleasant female, in no apparent distress  ENT: Oropharynx clear, moist mucous membranes  Neck: No lymphadenopathy, no thyromegaly  Respiratory: Lungs clear to auscultation bilaterally  Cardiovascular: Heart regular rate and rhythm, no murmurs rubs or gallops  Abdomen: Soft, nontender, nondistended, " normal bowel sounds  Skin: Warm, dry, no rash  Extremities: Warm well perfused, no lower extremity edema  Neuro: Alert and oriented ×3 no focal deficits     norma 7 and PHQ 9 are 0 today         ASSESSMENT/PLAN:    Tanya was seen today for recheck.  I am a little concerned that her symptoms might be related to depression, given the timing of onset following the miscarriage.  However she denies having any sort of depression.  I will go ahead and check her thyroid, CBC, vitamin D, CMP, and iron studies.  She also would like to have an A1c checked because she has a family history of diabetes, and this is reasonable.    Diagnoses and all orders for this visit:    Fatigue, unspecified type  Weight gain  Decreased appetite  -     TSH with free T4 reflex; Future  -     CBC with Platelets Differential; Future  -     25- OH-Vitamin D; Future  -     Comprehensive metabolic panel  -     Ferritin  -     Iron and iron binding capacity  -     Hemoglobin A1c; Future    Gastroesophageal reflux disease, esophagitis presence not specified  -     omeprazole 20 MG tablet; Take 1 tablet (20 mg) by mouth daily       Pt should return to clinic for f/u with me in PRN      Darcie Jay MD  12/22/17

## 2017-12-22 NOTE — TELEPHONE ENCOUNTER
Patient states she received a call w/o message from our clinic. No note in telephone encounters but note in lab results that Dr. Medardo Jay ordered oral iron for low hemoglobin and would like patient to f/u in 3 months. Encouraged pt to take iron with orange juice for absorption.    Patient agrees with plan and also requests tylenol refill. OK to order per Dr. Medardo Jay.

## 2017-12-22 NOTE — LETTER
1/[birth year]/2018         Tanya Denny   [address]  [place] [address]        Dear Ms. Denny:    Tanya-Your iron levels are low and you are anemic. This is likely why you aren't feeling well. I have sent a prescription for iron to your pharmacy. Please return to clinic in 3 months.     Results for orders placed or performed in visit on 12/22/17   Comprehensive metabolic panel   Result Value Ref Range    Sodium 142 133 - 144 mmol/L    Potassium 4.1 3.4 - 5.3 mmol/L    Chloride 108 94 - 109 mmol/L    Carbon Dioxide 29 20 - 32 mmol/L    Anion Gap 5 3 - 14 mmol/L    Glucose 83 70 - 99 mg/dL    Urea Nitrogen 11 7 - 30 mg/dL    Creatinine 0.73 0.52 - 1.04 mg/dL    GFR Estimate >90 >60 mL/min/1.7m2    GFR Estimate If Black >90 >60 mL/min/1.7m2    Calcium 8.2 (L) 8.5 - 10.1 mg/dL    Bilirubin Total 0.3 0.2 - 1.3 mg/dL    Albumin 3.3 (L) 3.4 - 5.0 g/dL    Protein Total 7.0 6.8 - 8.8 g/dL    Alkaline Phosphatase 49 40 - 150 U/L    ALT 24 0 - 50 U/L    AST 25 0 - 45 U/L   Ferritin   Result Value Ref Range    Ferritin 7 (L) 12 - 150 ng/mL   Iron and iron binding capacity   Result Value Ref Range    Iron 33 (L) 35 - 180 ug/dL    Iron Binding Cap 333 240 - 430 ug/dL    Iron Saturation Index 10 (L) 15 - 46 %   TSH with free T4 reflex   Result Value Ref Range    TSH 1.49 0.40 - 4.00 mU/L         Please note that test explanations are brief and do not reflect all diagnostic uses.  If you have any questions or concerns, please call the clinic at [phone].      Sincerely,      Alysia Rogel sent on behalf of  Darcie Batres MD

## 2017-12-23 ASSESSMENT — ANXIETY QUESTIONNAIRES: GAD7 TOTAL SCORE: 0

## 2018-10-05 DIAGNOSIS — Z3A.01 PREGNANCY WITH 7 COMPLETED WEEKS GESTATION: ICD-10-CM

## 2018-10-05 RX ORDER — VITAMIN A, ASCORBIC ACID, CHOLECALCIFEROL, .ALPHA.-TOCOPHEROL ACETATE, DL-, THIAMINE MONONITRATE, RIBOFLAVIN, NIACINAMIDE, PYRIDOXINE HYDROCHLORIDE, FOLIC ACID, CYANOCOBALAMIN, CALCIUM CARBONATE, IRON, ZINC OXIDE, AND CUPRIC OXIDE 4000; 120; 400; 22; 1.84; 3; 20; 10; 1; 12; 200; 29; 25; 2 [IU]/1; MG/1; [IU]/1; [IU]/1; MG/1; MG/1; MG/1; MG/1; MG/1; UG/1; MG/1; MG/1; MG/1; MG/1
1 TABLET ORAL DAILY
Qty: 90 TABLET | Refills: 3 | Status: SHIPPED | OUTPATIENT
Start: 2018-10-05 | End: 2019-11-04

## 2019-04-01 ENCOUNTER — OFFICE VISIT (OUTPATIENT)
Dept: PEDIATRICS | Facility: CLINIC | Age: 39
End: 2019-04-01
Payer: COMMERCIAL

## 2019-04-01 VITALS
TEMPERATURE: 98.3 F | SYSTOLIC BLOOD PRESSURE: 109 MMHG | HEART RATE: 65 BPM | HEIGHT: 64 IN | OXYGEN SATURATION: 100 % | BODY MASS INDEX: 34.04 KG/M2 | DIASTOLIC BLOOD PRESSURE: 73 MMHG | WEIGHT: 199.4 LBS

## 2019-04-01 DIAGNOSIS — J06.9 UPPER RESPIRATORY TRACT INFECTION, UNSPECIFIED TYPE: ICD-10-CM

## 2019-04-01 DIAGNOSIS — M54.50 BILATERAL LOW BACK PAIN WITHOUT SCIATICA, UNSPECIFIED CHRONICITY: Primary | ICD-10-CM

## 2019-04-01 DIAGNOSIS — R53.83 OTHER FATIGUE: ICD-10-CM

## 2019-04-01 PROCEDURE — 99214 OFFICE O/P EST MOD 30 MIN: CPT | Performed by: FAMILY MEDICINE

## 2019-04-01 RX ORDER — NAPROXEN 500 MG/1
500 TABLET ORAL 2 TIMES DAILY WITH MEALS
Qty: 60 TABLET | Refills: 0 | Status: SHIPPED | OUTPATIENT
Start: 2019-04-01 | End: 2019-11-04

## 2019-04-01 ASSESSMENT — MIFFLIN-ST. JEOR: SCORE: 1565.5

## 2019-04-01 ASSESSMENT — PAIN SCALES - GENERAL: PAINLEVEL: SEVERE PAIN (7)

## 2019-04-01 NOTE — LETTER
11/10/2017       RE: Tanya Denny  [address]  [place] [facility] [address]     Dear Colleague,    Thank you for referring your patient, Tanya Denny, to the [facility]  at [facility]. Please see a copy of my visit note below.                               SUBJECTIVE:  Tanya Denny is a 37 year old female who comes in for back pain. The pain is midline and radiating to between her shoulder blades. Occasionally her legs feel heavy. No bowel/bladder dysfunction. No known injuries. Has tried stopping work but this did not help. Has previously seen PT and had great relief of symptoms. Would like to have a new order for PT.       Medications and allergies reviewed by me today.     ROS:   Constitutional, HEENT, cardiovascular, pulmonary, gi and gu systems are negative, except as otherwise noted.      OBJECTIVE:  /86  Pulse 77  Wt 84.8 kg (187 lb)  LMP 10/11/2017  Breastfeeding? No  BMI 32.1 kg/m2   Wt Readings from Last 1 Encounters:   11/10/17 84.8 kg (187 lb)     Gen: Pleasant female, in NAD  Back: Pain with lumbar flexion/extension, R lateral rotation; no pain with R/L lateral flexion or L Lateral rotation. Tender to palpation over lower lumbar spine. No SI tenderness; SLR neg bilaterally  Neuro: 5/5 strength in LE bilaterally, gait normal       ASSESSMENT/PLAN:    Tanya was seen today for recheck.    Diagnoses and all orders for this visit:    Chronic midline low back pain without sciatica  -     PHYSICAL THERAPY REFERRAL       Pt should return to clinic for f/u with me in PRN        Again, thank you for allowing me to participate in the care of your patient.      Sincerely,    Darcie Jay MD      
rollator

## 2019-04-01 NOTE — PROGRESS NOTES
SUBJECTIVE:   Tanya Denny is a 39 year old female who presents to clinic today for the following health issues:    Patient is new to the provider, is here to establish care and with other concerns as mentioned below.    Patient is complaining of minimally productive cough for the past 1 week associated with a low-grade fever at the onset of symptoms for the first 2 days and with no associated concerns for current concerns for fever, chills, sinus pain, pressure, sore throat, postnasal drainage, nasal or aural symptoms for the past 1 week.    Patient is  6 para 6, last childbirth-2 months ago, is currently breast-feeding.  Does not smoke. Has no h/o asthnma or allergies.  She is also complaining of feeling tired and fatigued secondary to ongoing low back pain for the past 2-3 months with no concern for low back stiffness, tingling, numbness or weakness of the lower extremities, new onset of bladder or bowel incontinence, previous history of low back pain.  Patient used ibuprofen 800 mg that was given by her OB/GYN postpartum that has helped with the low back pain, but she ran out of medications.  Patient did not have a  with her current pregnancy, denies having epidural injections including for her labor  Patient has history of underlying iron deficiency anemia, is currently taking ferrous sulfate 325 mg once daily along with her prenatal vitamins daily.  She does not have a concern for thyroid disorder, abnormal bleeding problems.  Patient denies concerns for sleep problems,, depression, anxiety.      New Patient  Acute Illness   Acute illness concerns: body aches, fatigue and low back aches  Onset: 1 week    Fever: YES- sometimes    Chills/Sweats: no    Headache (location?): YES- intermittent, usually in the evening    Sinus Pressure:no    Conjunctivitis:  no    Ear Pain: no    Rhinorrhea: no    Congestion: no    Sore Throat: no     Cough: YES    Wheeze: no    Decreased Appetite:  no    Nausea: no    Vomiting: no    Diarrhea:  no    Dysuria/Freq.: no    Fatigue/Achiness: YES    Sick/Strep Exposure: no      Therapies Tried and outcome: Tylenol and Ibuprofen - effective when taking          Problem list and histories reviewed & adjusted, as indicated.  Additional history: as documented    Patient Active Problem List   Diagnosis     CARDIOVASCULAR SCREENING; LDL GOAL LESS THAN 160     Dyspepsia     Lower back pain     Lumbago     Overweight     Chronic mixed headache syndrome     Uterine leiomyoma, unspecified location     Chest pain, unspecified     Past Surgical History:   Procedure Laterality Date     D & C  12/05       Social History     Tobacco Use     Smoking status: Never Smoker     Smokeless tobacco: Never Used   Substance Use Topics     Alcohol use: No     Family History   Problem Relation Age of Onset     Allergies Mother         angioedema in family members unspecified     Diabetes Mother      Hypertension Mother      Diabetes Father      Hypertension Father      Diabetes Maternal Grandmother      Hypertension Maternal Grandmother      Asthma Maternal Grandmother      Diabetes Maternal Grandfather      Hypertension Maternal Grandfather      Asthma Maternal Grandfather      Cancer No family hx of      Cerebrovascular Disease No family hx of      Thyroid Disease No family hx of      Glaucoma No family hx of      Macular Degeneration No family hx of      Breast Cancer No family hx of      Colon Cancer No family hx of      Depression No family hx of      Anxiety Disorder No family hx of      Genetic Disorder No family hx of          Current Outpatient Medications   Medication Sig Dispense Refill     acetaminophen (TYLENOL) 325 MG tablet Take 2 tablets (650 mg) by mouth every 4 hours as needed for mild pain 100 tablet 0     ferrous sulfate (IRON) 325 (65 FE) MG tablet Take 1 tablet (325 mg) by mouth 3 times daily (with meals) 90 tablet 2     naproxen (NAPROSYN) 500 MG tablet Take 1 tablet  (500 mg) by mouth 2 times daily (with meals) 60 tablet 0     omeprazole 20 MG tablet Take 1 tablet (20 mg) by mouth daily 30 tablet 3     Prenatal Vit-Iron Carbonyl-FA (PRENATAL PLUS IRON) 29-1 MG TABS Take 1 tablet by mouth daily 90 tablet 3     No Known Allergies  Recent Labs   Lab Test 12/22/17  0753 08/28/17  2112 07/05/17  1508  05/06/16  1030 03/27/16  1212  08/19/14  1422   A1C 5.2  --   --   --  5.0  --   --  4.8   LDL  --   --   --   --  82  --   --   --    HDL  --   --   --   --  61  --   --   --    TRIG  --   --   --   --  63  --   --   --    ALT 24  --  15  --   --  20  --   --    CR 0.73 0.64 0.72   < >  --  0.66  --   --    GFRESTIMATED >90 >90 >90  Non African American GFR Calc     < >  --  >90  Non  GFR Calc    --   --    GFRESTBLACK >90 >90 >90  African American GFR Calc     < >  --  >90   GFR Calc    --   --    POTASSIUM 4.1 3.2* 3.9   < >  --  4.3  --   --    TSH 1.49  --  0.98   < >  --   --    < > 0.41    < > = values in this interval not displayed.      BP Readings from Last 3 Encounters:   04/01/19 109/73   12/22/17 113/75   11/10/17 119/86    Wt Readings from Last 3 Encounters:   04/01/19 90.4 kg (199 lb 6.4 oz)   12/22/17 85.9 kg (189 lb 4.8 oz)   11/10/17 84.8 kg (187 lb)                  Labs reviewed in EPIC    Reviewed and updated as needed this visit by clinical staff       Reviewed and updated as needed this visit by Provider         ROS:  CONSTITUTIONAL:fatigue  INTEGUMENTARY/SKIN: NEGATIVE for worrisome rashes, moles or lesions  EYES: NEGATIVE for vision changes or irritation  ENT/MOUTH: NEGATIVE for ear, mouth and throat problems  RESP:as above  CV: NEGATIVE for chest pain, palpitations or peripheral edema  GI: NEGATIVE for nausea, abdominal pain, heartburn, or change in bowel habits  MUSCULOSKELETAL: as above  NEURO: NEGATIVE for weakness, dizziness or paresthesias  ENDOCRINE: NEGATIVE for temperature intolerance, skin/hair  "changes  HEME/ALLERGY/IMMUNE: NEGATIVE for bleeding problems  PSYCHIATRIC: NEGATIVE for changes in mood or affect    OBJECTIVE:     /73 (BP Location: Right arm, Patient Position: Sitting, Cuff Size: Adult Regular)   Pulse 65   Temp 98.3  F (36.8  C) (Oral)   Ht 1.619 m (5' 3.75\")   Wt 90.4 kg (199 lb 6.4 oz)   LMP 03/17/2019   SpO2 100%   BMI 34.50 kg/m    Body mass index is 34.5 kg/m .  GENERAL: healthy, alert and no distress  EYES: Eyes grossly normal to inspection  HENT: ear canals and TM's normal, nose and mouth without ulcers or lesions  NECK: no adenopathy, no asymmetry, masses, or scars and thyroid normal to palpation  RESP: lungs clear to auscultation - no rales, rhonchi or wheezes  CV: regular rate and rhythm, normal S1 S2, no S3 or S4, no murmur, click or rub, no peripheral edema and peripheral pulses strong  MS: no gross musculoskeletal defects noted, no edema  MS: Normal gait  SKIN: no suspicious lesions or rashes  NEURO: Normal strength and tone, mentation intact and speech normal  BACK: no CVA tenderness, no paralumbar tenderness  Comprehensive back pain exam:  No tenderness, Range of motion not limited by pain, Lower extremity strength functional and equal on both sides, Lower extremity reflexes within normal limits bilaterally, Lower extremity sensation normal and equal on both sides and Straight leg raise negative bilaterally  PSYCH: mentation appears normal, affect normal/bright    Diagnostic Test Results:  none     ASSESSMENT/PLAN:             1. Bilateral low back pain without sciatica, unspecified chronicity  ddx-lumbar strain  Recommended to start on physical therapy  Recommended  local ice and heat, avoid triggering activities, Tylenol arthritis as needed for mild pain, take naproxen for moderate to severe pain for pain and rtc ofr persistent or worsening concerns in 4weeks.  Consider imaging/sports referral  for persistent or worsening concerns  Dosing and potential medication " side effects discussed.  Patient verbalised understanding and is agreeable to the plan.      - naproxen (NAPROSYN) 500 MG tablet; Take 1 tablet (500 mg) by mouth 2 times daily (with meals)  Dispense: 60 tablet; Refill: 0  - [facility]; Future    2. Upper respiratory tract infection, unspecified type  Likely viral, reassured patient, try over-the-counter Mucinex cough syrup as needed for cough, if symptoms are not any better in 2 weeks, patient understands to follow-up for recheck    3. Other fatigue  ddx-functional/postpartum  Reviewed normal thyroid labs and a hemoglobin of 11.8 g that was done 2 months ago from care everywhere  Recommended to continue with iron supplements and prenatal vitamins  Follow-up if fatigue is not any better in 2-3 months or sooner if needed      Chart documentation done in part with Dragon Voice recognition Software. Although reviewed after completion, some word and grammatical error may remain.    See Patient Instructions    Zac Ye MD  [facility]

## 2019-04-01 NOTE — PATIENT INSTRUCTIONS
Take mucinex syrup as needed for cough  Take tylenol arthritis as needed for mild pain  Take NAPROXEN as needed for moderate- severe pain   Start on PT for low back pain

## 2019-04-08 ENCOUNTER — TELEPHONE (OUTPATIENT)
Dept: PEDIATRICS | Facility: CLINIC | Age: 39
End: 2019-04-08

## 2019-04-08 DIAGNOSIS — G89.29 CHRONIC BILATERAL LOW BACK PAIN WITHOUT SCIATICA: Primary | ICD-10-CM

## 2019-04-08 DIAGNOSIS — M54.50 CHRONIC BILATERAL LOW BACK PAIN WITHOUT SCIATICA: Primary | ICD-10-CM

## 2019-04-08 NOTE — TELEPHONE ENCOUNTER
[facility] Call Center    Phone Message    May a detailed message be left on voicemail: no    Reason for Call: Order(s): Other:   Reason for requested: Physical therapy. [facility]   [place] [id number] [facility]  PHONE: pt did not know  FAX: [phone]  Date needed: Today.  Pt does not want to go to referral that Dr Ye gave.   Provider name: Cassi.      Action Taken: Message routed to:  Primary Care p [id number]

## 2019-04-08 NOTE — TELEPHONE ENCOUNTER
[facility] referral placed 04/01/19 through [facility].    Patient requesting Physical Therapy referral to [facility]. Routing to Dr. Ye for updated referral.  Sydney Wilkes CMA

## 2019-04-08 NOTE — TELEPHONE ENCOUNTER
Physical Therapy referral faxed to [employer] [facility] at fax #: [phone]. Received confirmation from [employer] that fax was sent successfully.    May a detailed message be left on voicemail: no    Attempt #1:  Left message for patient to return call to clinic. Clinic number given.  Sydney Wilkes CMA

## 2019-05-17 ENCOUNTER — OFFICE VISIT (OUTPATIENT)
Dept: PEDIATRICS | Facility: CLINIC | Age: 39
End: 2019-05-17
Payer: COMMERCIAL

## 2019-05-17 VITALS
BODY MASS INDEX: 33.73 KG/M2 | WEIGHT: 195 LBS | TEMPERATURE: 97.7 F | OXYGEN SATURATION: 99 % | SYSTOLIC BLOOD PRESSURE: 100 MMHG | HEART RATE: 78 BPM | DIASTOLIC BLOOD PRESSURE: 60 MMHG

## 2019-05-17 DIAGNOSIS — Z64.1 MULTIGRAVIDA: Primary | ICD-10-CM

## 2019-05-17 DIAGNOSIS — Z32.01 PREGNANCY TEST POSITIVE: ICD-10-CM

## 2019-05-17 LAB — HCG UR QL: POSITIVE

## 2019-05-17 PROCEDURE — 99213 OFFICE O/P EST LOW 20 MIN: CPT | Performed by: FAMILY MEDICINE

## 2019-05-17 PROCEDURE — 81025 URINE PREGNANCY TEST: CPT | Performed by: FAMILY MEDICINE

## 2019-05-17 NOTE — PROGRESS NOTES
SUBJECTIVE:   Tanya Denny is a 39 year old female who presents to clinic today for the following   health issues:    Confirmation of pregnancy. LMP:3/17/19-per patient urine pregnancy tests never show, would like to have blood test if urine is negative. Currently not using any birth control.  Denies breast tenderness but more tired, headaches which are usually present with pregnancy.    Additional history: as documented    Reviewed  and updated as needed this visit by clinical staff  Tobacco  Allergies  Meds  Problems  Med Hx  Surg Hx  Fam Hx  Soc Hx          Reviewed and updated as needed this visit by Provider  Tobacco  Allergies  Meds  Problems  Med Hx  Surg Hx  Fam Hx         Patient Active Problem List   Diagnosis     CARDIOVASCULAR SCREENING; LDL GOAL LESS THAN 160     Dyspepsia     Lower back pain     Lumbago     Overweight     Chronic mixed headache syndrome     Uterine leiomyoma, unspecified location     Chest pain, unspecified     Past Surgical History:   Procedure Laterality Date     D & C  12/05       Social History     Tobacco Use     Smoking status: Never Smoker     Smokeless tobacco: Never Used   Substance Use Topics     Alcohol use: No     Family History   Problem Relation Age of Onset     Allergies Mother         angioedema in family members unspecified     Diabetes Mother      Hypertension Mother      Diabetes Father      Hypertension Father      Diabetes Maternal Grandmother      Hypertension Maternal Grandmother      Asthma Maternal Grandmother      Diabetes Maternal Grandfather      Hypertension Maternal Grandfather      Asthma Maternal Grandfather      Cancer No family hx of      Cerebrovascular Disease No family hx of      Thyroid Disease No family hx of      Glaucoma No family hx of      Macular Degeneration No family hx of      Breast Cancer No family hx of      Colon Cancer No family hx of      Depression No family hx of      Anxiety Disorder No family hx of       Genetic Disorder No family hx of          Current Outpatient Medications   Medication Sig Dispense Refill     acetaminophen (TYLENOL) 325 MG tablet Take 2 tablets (650 mg) by mouth every 4 hours as needed for mild pain 100 tablet 0     Cholecalciferol (VITAMIN D PO) Take 1,000 Units by mouth daily       ferrous sulfate (IRON) 325 (65 FE) MG tablet Take 1 tablet (325 mg) by mouth 3 times daily (with meals) 90 tablet 2     omeprazole 20 MG tablet Take 1 tablet (20 mg) by mouth daily 30 tablet 3     Prenatal Vit-Iron Carbonyl-FA (PRENATAL PLUS IRON) 29-1 MG TABS Take 1 tablet by mouth daily 90 tablet 3     naproxen (NAPROSYN) 500 MG tablet Take 1 tablet (500 mg) by mouth 2 times daily (with meals) (Patient not taking: Reported on 5/17/2019) 60 tablet 0       ROS:  Constitutional, HEENT, cardiovascular, pulmonary, gi and gu systems are negative, except as otherwise noted.    OBJECTIVE:     /60 (BP Location: Right arm, Patient Position: Sitting, Cuff Size: Adult Regular)   Pulse 78   Temp 97.7  F (36.5  C) (Temporal)   Wt 88.5 kg (195 lb)   LMP 03/17/2019   SpO2 99%   Breastfeeding? Yes   BMI 33.73 kg/m    Body mass index is 33.73 kg/m .  GENERAL: healthy, alert and no distress  NECK: no adenopathy, no asymmetry, masses, or scars and thyroid normal to palpation  RESP: lungs clear to auscultation - no rales, rhonchi or wheezes  CV: regular rate and rhythm, normal S1 S2, no S3 or S4, no murmur, click or rub, no peripheral edema and peripheral pulses strong  MS: no gross musculoskeletal defects noted, no edema  NEURO: within normal limits    Results for orders placed or performed in visit on 05/17/19   HCG Qual, Urine ([id number])   Result Value Ref Range    HCG Qual Urine Positive (A) NEG^Negative         ASSESSMENT/PLAN:      Prenatal vitamins daily.  Given first trimester precautions/warning signs.  Encouraged healthy lifestyle and avoidance of alcohol, drugs, and caffeine.  All questions answered.  Given all  options for OB care, including family practice, OB/Gyn, and midwives.  Her first OB appointment should optimally be between 8 and 12 weeks.  Call or come in for concerns that arise sooner.     Mele Floyd MD  [facility]

## 2019-06-07 ENCOUNTER — PRENATAL OFFICE VISIT (OUTPATIENT)
Dept: OBGYN | Facility: CLINIC | Age: 39
End: 2019-06-07
Attending: FAMILY MEDICINE
Payer: COMMERCIAL

## 2019-06-07 VITALS
SYSTOLIC BLOOD PRESSURE: 109 MMHG | WEIGHT: 200.6 LBS | BODY MASS INDEX: 34.25 KG/M2 | DIASTOLIC BLOOD PRESSURE: 68 MMHG | HEART RATE: 75 BPM | HEIGHT: 64 IN

## 2019-06-07 DIAGNOSIS — O09.299 HX OF MACROSOMIA IN INFANT IN PRIOR PREGNANCY, CURRENTLY PREGNANT: ICD-10-CM

## 2019-06-07 DIAGNOSIS — Z64.1 GRAND MULTIPARITY: ICD-10-CM

## 2019-06-07 DIAGNOSIS — O09.521 ELDERLY MULTIGRAVIDA IN FIRST TRIMESTER: ICD-10-CM

## 2019-06-07 DIAGNOSIS — Z36.87 UNSURE OF LMP (LAST MENSTRUAL PERIOD) AS REASON FOR ULTRASOUND SCAN: ICD-10-CM

## 2019-06-07 DIAGNOSIS — Z34.81 ENCOUNTER FOR SUPERVISION OF OTHER NORMAL PREGNANCY IN FIRST TRIMESTER: Primary | ICD-10-CM

## 2019-06-07 DIAGNOSIS — O09.899 SHORT INTERVAL BETWEEN PREGNANCIES AFFECTING PREGNANCY, ANTEPARTUM: ICD-10-CM

## 2019-06-07 DIAGNOSIS — R09.81 CONGESTION OF PARANASAL SINUS: ICD-10-CM

## 2019-06-07 PROBLEM — O09.529 AMA (ADVANCED MATERNAL AGE) MULTIGRAVIDA 35+: Status: ACTIVE | Noted: 2019-06-07

## 2019-06-07 LAB
ABO + RH BLD: NORMAL
ABO + RH BLD: NORMAL
ALBUMIN UR-MCNC: NEGATIVE MG/DL
APPEARANCE UR: CLEAR
BILIRUB UR QL STRIP: NEGATIVE
BLD GP AB SCN SERPL QL: NORMAL
BLOOD BANK CMNT PATIENT-IMP: NORMAL
COLOR UR AUTO: YELLOW
ERYTHROCYTE [DISTWIDTH] IN BLOOD BY AUTOMATED COUNT: 13.1 % (ref 10–15)
GLUCOSE UR STRIP-MCNC: NEGATIVE MG/DL
HBA1C MFR BLD: 4.9 % (ref 0–5.6)
HCT VFR BLD AUTO: 32 % (ref 35–47)
HGB BLD-MCNC: 11.1 G/DL (ref 11.7–15.7)
HGB UR QL STRIP: NEGATIVE
KETONES UR STRIP-MCNC: NEGATIVE MG/DL
LEUKOCYTE ESTERASE UR QL STRIP: NEGATIVE
MCH RBC QN AUTO: 29 PG (ref 26.5–33)
MCHC RBC AUTO-ENTMCNC: 34.7 G/DL (ref 31.5–36.5)
MCV RBC AUTO: 84 FL (ref 78–100)
MUCOUS THREADS #/AREA URNS LPF: PRESENT /LPF
NITRATE UR QL: NEGATIVE
NON-SQ EPI CELLS #/AREA URNS LPF: ABNORMAL /LPF
PH UR STRIP: 7 PH (ref 5–7)
PLATELET # BLD AUTO: 192 10E9/L (ref 150–450)
RBC # BLD AUTO: 3.83 10E12/L (ref 3.8–5.2)
RBC #/AREA URNS AUTO: ABNORMAL /HPF
SOURCE: ABNORMAL
SP GR UR STRIP: 1.02 (ref 1–1.03)
SPECIMEN EXP DATE BLD: NORMAL
UROBILINOGEN UR STRIP-MCNC: NORMAL MG/DL (ref 0–2)
WBC # BLD AUTO: 9 10E9/L (ref 4–11)
WBC #/AREA URNS AUTO: ABNORMAL /HPF

## 2019-06-07 PROCEDURE — 86850 RBC ANTIBODY SCREEN: CPT | Performed by: OBSTETRICS & GYNECOLOGY

## 2019-06-07 PROCEDURE — 85027 COMPLETE CBC AUTOMATED: CPT | Performed by: OBSTETRICS & GYNECOLOGY

## 2019-06-07 PROCEDURE — 87086 URINE CULTURE/COLONY COUNT: CPT | Performed by: OBSTETRICS & GYNECOLOGY

## 2019-06-07 PROCEDURE — 99203 OFFICE O/P NEW LOW 30 MIN: CPT | Performed by: OBSTETRICS & GYNECOLOGY

## 2019-06-07 PROCEDURE — 86780 TREPONEMA PALLIDUM: CPT | Performed by: OBSTETRICS & GYNECOLOGY

## 2019-06-07 PROCEDURE — 87389 HIV-1 AG W/HIV-1&-2 AB AG IA: CPT | Performed by: OBSTETRICS & GYNECOLOGY

## 2019-06-07 PROCEDURE — 86762 RUBELLA ANTIBODY: CPT | Performed by: OBSTETRICS & GYNECOLOGY

## 2019-06-07 PROCEDURE — 36415 COLL VENOUS BLD VENIPUNCTURE: CPT | Performed by: OBSTETRICS & GYNECOLOGY

## 2019-06-07 PROCEDURE — 86901 BLOOD TYPING SEROLOGIC RH(D): CPT | Performed by: OBSTETRICS & GYNECOLOGY

## 2019-06-07 PROCEDURE — 81001 URINALYSIS AUTO W/SCOPE: CPT | Performed by: OBSTETRICS & GYNECOLOGY

## 2019-06-07 PROCEDURE — 86900 BLOOD TYPING SEROLOGIC ABO: CPT | Performed by: OBSTETRICS & GYNECOLOGY

## 2019-06-07 PROCEDURE — 87340 HEPATITIS B SURFACE AG IA: CPT | Performed by: OBSTETRICS & GYNECOLOGY

## 2019-06-07 PROCEDURE — 83036 HEMOGLOBIN GLYCOSYLATED A1C: CPT | Performed by: OBSTETRICS & GYNECOLOGY

## 2019-06-07 RX ORDER — GUAIFENESIN 200 MG/1
200 TABLET ORAL EVERY 4 HOURS PRN
Qty: 20 TABLET | Refills: 1 | Status: SHIPPED | OUTPATIENT
Start: 2019-06-07 | End: 2020-06-03

## 2019-06-07 ASSESSMENT — MIFFLIN-ST. JEOR: SCORE: 1577.67

## 2019-06-07 NOTE — NURSING NOTE
"Chief Complaint   Patient presents with     Prenatal Care       Initial /68 (BP Location: Right arm, Patient Position: Chair, Cuff Size: Adult Regular)   Pulse 75   Ht 1.63 m (5' 4.17\")   Wt 91 kg (200 lb 9.6 oz)   LMP 2019   BMI 34.25 kg/m   Estimated body mass index is 33.73 kg/m  as calculated from the following:    Height as of 19: 1.619 m (5' 3.75\").    Weight as of 19: 88.5 kg (195 lb).  BP completed using cuff size: regular        The following HM Due: pap smear      The following patient reported/Care Every where data was sent to:  P ABSTRACT QUALITY INITIATIVES [[id number]]       patient has appointment for today       Margaret Sheth CMA  2019    "

## 2019-06-07 NOTE — PROGRESS NOTES
39 year old  at 11w5d presents for New OB appointment.  Estimated Date of Delivery: Dec 22, 2019 by LMP.    No complaints.   No vaginal bleeding, no leaking fluid, no contractions.      Electronic chart, including labs and imaging reviewed.    Spontaneous vaginal delivery on 19 with [facility].   39 wks, IOL.  8 lb 12 oz.  Male.  No anesthesia.  Goudge.      Surgical History      Surgery Date Laterality Comments   DILATION AND CURETTAGE           Medical History      Medical History Date Comments   Migraines       Low vitamin D level           Last PHQ-9 score on record=   PHQ-9 SCORE 2017   PHQ-9 Total Score -   PHQ-9 Total Score 5       Obstetric History  OB History    Para Term  AB Living   9 6 6 0 2 6   SAB TAB Ectopic Multiple Live Births   2 0 0 0 6      # Outcome Date GA Lbr Alexandru/2nd Weight Sex Delivery Anes PTL Lv   9 Current            8 Term 19 39w0d  3.969 kg (8 lb 12 oz) M  None N ADAM      Apgar1: 8  Apgar5: 9   7 Term 10/17/13 40w6d 05:31 / 00:05 3.912 kg (8 lb 10 oz) F Vag-Spont None N ADAM      Birth Comments: none      Name: ECHO PAYNE      Apgar1: 9  Apgar5: 9   6 Term 03/11/10 39w5d   F    ADAM      Birth Comments: [place]   5 Term 06 39w5d   M    ADAM      Birth Comments: [place]   4 Term 05 41w0d   M    ADAM      Birth Comments: [place]   3 SAB 10/01/04 4w0d             Birth Comments: D&C   2 Term 01 39w4d   F    ADAM      Birth Comments: [place]   1 SAB                Gynecologic History  Last Pap:  at [facility].  NIL, hr HPV neg  Lab Results   Component Value Date    PAP NIL 2016    PAP NIL 2012    PAP NIL 2010       Most Recent Immunizations   Administered Date(s) Administered     HepB 2016     Influenza (IIV3) PF 10/10/2008     MMR 2012     Mantoux Tuberculin Skin Test 2016     TD (ADULT, 7+) 2012     TDAP Vaccine (Boostrix)  2013     Tdap (Adacel,Boostrix) 2012     Varicella 2012        Patient Active Problem List   Diagnosis     CARDIOVASCULAR SCREENING; LDL GOAL LESS THAN 160     Dyspepsia     Lower back pain     Lumbago     Overweight     Chronic mixed headache syndrome     Uterine leiomyoma, unspecified location     Chest pain, unspecified     AMA (advanced maternal age) multigravida 35+     Short interval between pregnancies affecting pregnancy, antepartum     Hx of macrosomia in infant in prior pregnancy, currently pregnant     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       Current Outpatient Medications   Medication     acetaminophen (TYLENOL) 325 MG tablet     Cholecalciferol (VITAMIN D PO)     ferrous sulfate (IRON) 325 (65 FE) MG tablet     guaiFENesin (ORGANIDIN) 200 MG tablet     omeprazole 20 MG tablet     Prenatal Vit-Iron Carbonyl-FA (PRENATAL PLUS IRON) 29-1 MG TABS     naproxen (NAPROSYN) 500 MG tablet     No current facility-administered medications for this visit.        No Known Allergies    History  Past Medical History:   Diagnosis Date     Migraine      SAB (spontaneous )     1ST TRIMESTER     Past Surgical History:   Procedure Laterality Date     D & C         Social History     Socioeconomic History     Marital status:      Spouse name: Not on file     Number of children: Not on file     Years of education: Not on file     Highest education level: Not on file   Occupational History     Not on file   Social Needs     Financial resource strain: Not on file     Food insecurity:     Worry: Not on file     Inability: Not on file     Transportation needs:     Medical: Not on file     Non-medical: Not on file   Tobacco Use     Smoking status: Never Smoker     Smokeless tobacco: Never Used   Substance and Sexual Activity     Alcohol use: No     Drug use: No     Sexual activity: Yes     Partners: Male     Birth control/protection: None   Lifestyle     Physical activity:     Days per week: Not on file      "Minutes per session: Not on file     Stress: Not on file   Relationships     Social connections:     Talks on phone: Not on file     Gets together: Not on file     Attends Christian service: Not on file     Active member of club or organization: Not on file     Attends meetings of clubs or organizations: Not on file     Relationship status: Not on file     Intimate partner violence:     Fear of current or ex partner: Not on file     Emotionally abused: Not on file     Physically abused: Not on file     Forced sexual activity: Not on file   Other Topics Concern     Parent/sibling w/ CABG, MI or angioplasty before 65F 55M? No      Service No     Blood Transfusions No     Caffeine Concern No     Occupational Exposure No     Hobby Hazards No     Sleep Concern No     Stress Concern No     Weight Concern No     Special Diet Yes     Back Care No     Exercise Yes     Bike Helmet No     Seat Belt Yes     Self-Exams Yes   Social History Narrative    Mother of 4, all starting school this yearWorks in  can bring her kids there too.  is a teacher , out of work now.         How much exercise per week? 3 days     How much calcium per day? 1 serving      How much caffeine per day? 3 cups    How much vitamin D per day?  prenatals    Do you/your family wear seatbelts?  Yes    Do you/your family use safety helmets? na    Do you/your family use sunscreen?No    Do you/your family keep firearms in the home? No    Do you/your family have a smoke detector(s)? Yes        Do you feel safe in your home? Yes    Has anyone ever touched you in an unwanted manner?      Explain         June 11, 2014 Shaneka Abbasi LPN        [facility]           ROS - Please see HPI, otherwise 10pt ROS negative.      Physical Exam  Vitals: /68 (BP Location: Right arm, Patient Position: Chair, Cuff Size: Adult Regular)   Pulse 75   Ht 1.63 m (5' 4.17\")   Wt 91 kg (200 lb 9.6 oz)   LMP 03/17/2019   BMI 34.25 kg/m  "   BMI= Body mass index is 34.25 kg/m .  Gen: Alert and oriented times 3, no acute distress.  Well developed, well nourished, pleasant.    Neck: Supple, no masses.  No thyromegaly.  Chest:  Non labored.  Clear to auscultation bilaterally.    Heart: Regular, normal S1, S2.  No murmurs.   Abdomen: Soft, nontender, nondistended.  No palpable masses.    :  Normal female external genitalia, Ben stage V.  No lesions.  Speculum exam reveals a normal vaginal vault, normal cervix.  No abnormal discharge.  Bimanual exam reveals a normal, mobile, nontender uterus, size consistent with dates.  No cervical motion tenderness.  Adnexa nontender with no palpable masses.   Extremities:  Nontender, no edema.      Assessment and Plan:  39 year old  with IUP at 11w5d by LMP.  This was her first and only period since recent delivery.      ICD-10-CM    1. Encounter for supervision of other normal pregnancy in first trimester Z34.81 ABO/Rh type and screen     Hepatitis B surface antigen     CBC with platelets     HIV Antigen Antibody Combo     Rubella Antibody IgG Quantitative     Treponema Abs w Reflex to RPR and Titer     Urine Culture Aerobic Bacterial     UA with Microscopic ([facility]; [facility])     Hemoglobin A1c   2. Unsure of LMP (last menstrual period) as reason for ultrasound scan Z36.87 US OB < 14 Weeks Single   3. Congestion of paranasal sinus R09.81 guaiFENesin (ORGANIDIN) 200 MG tablet   4. Elderly multigravida in first trimester O09.521    5. Short interval between pregnancies affecting pregnancy, antepartum O09.899    6. Hx of macrosomia in infant in prior pregnancy, currently pregnant O09.299    7. 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 Z64.1        History of macrosomia - Prior 10#11oz infant. Born at 42 weeks. No dystocia.   NO history of GDM  History of Fibroid - not seen on last OB US.   Pap due in     Cold symptoms - prescription sent in per patient request.    Discussed genetic screening options:  Plan level 2  US  Ordered labs and ultrasound.  Prefers not to do a 1 hr, will check [id number]  Folder given, outlining physician coverage, exercise, weight gain, schedule of visits, routine and indicated ultrasounds, prenatal vitamins and childbirth education.    Body mass index is 34.25 kg/m . -  11-20 lbs (BMI >30)   Return in 4 weeks for routine OB care    She does not have her reading glasses so she will bring her OB questionaire with her to her next visit.       30 minutes was spent face to face with the patient today discussing her history, diagnosis, and follow-up plan as noted above.  Over 50% of the visit was spent in counseling and coordination of care.    Lisa Damon MD FACOG

## 2019-06-08 LAB
BACTERIA SPEC CULT: NO GROWTH
RUBV IGG SERPL IA-ACNC: 49 IU/ML
SPECIMEN SOURCE: NORMAL
T PALLIDUM AB SER QL: NONREACTIVE

## 2019-06-10 LAB
HBV SURFACE AG SERPL QL IA: NONREACTIVE
HIV 1+2 AB+HIV1 P24 AG SERPL QL IA: NONREACTIVE

## 2019-06-17 ENCOUNTER — TELEPHONE (OUTPATIENT)
Dept: OBGYN | Facility: OTHER | Age: 39
End: 2019-06-17

## 2019-06-17 NOTE — TELEPHONE ENCOUNTER
RN called and relayed normal prenatal lab results to patient. Told her that I apologized because her results were sent through [employer]. Patient explained that she was having trouble getting into her mychart due to forgetting the password. She verbalized understanding. No further questions at this time.     Katie Conde RN on 6/17/2019 at 11:30 AM

## 2019-06-17 NOTE — TELEPHONE ENCOUNTER
[facility] Call Center    Phone Message    May a detailed message be left on voicemail: yes    Reason for Call: Requesting Results   Name/type of test: Lab tests  Date of test: 6/7/2019  Was test done at a location other than [facility] (Please fill in the location if not [facility])?: No      Action Taken: Message routed to:  [facility] p [phone]

## 2019-11-03 ENCOUNTER — HEALTH MAINTENANCE LETTER (OUTPATIENT)
Age: 39
End: 2019-11-03

## 2019-11-04 DIAGNOSIS — Z3A.01 PREGNANCY WITH 7 COMPLETED WEEKS GESTATION: ICD-10-CM

## 2019-11-05 RX ORDER — PRENATAL VIT,CAL 76/IRON/FOLIC 29 MG-1 MG
TABLET ORAL
Qty: 30 TABLET | Refills: 23 | Status: SHIPPED | OUTPATIENT
Start: 2019-11-05 | End: 2020-11-18

## 2020-02-03 ENCOUNTER — MEDICAL CORRESPONDENCE (OUTPATIENT)
Dept: HEALTH INFORMATION MANAGEMENT | Facility: CLINIC | Age: 40
End: 2020-02-03

## 2020-03-20 ENCOUNTER — TELEPHONE (OUTPATIENT)
Dept: PEDIATRICS | Facility: CLINIC | Age: 40
End: 2020-03-20

## 2020-03-20 NOTE — TELEPHONE ENCOUNTER
[facility] Call Center    Phone Message    May a detailed message be left on voicemail: yes     Reason for Call: Other: Patient would like a call back to discuss her menstrual cycle Please advise     Action Taken: Message routed to:  Primary Care p [phone]

## 2020-03-24 NOTE — TELEPHONE ENCOUNTER
I Have called pt she has had telephone visit with her PCP today she thinks this message was sent in error. Althea EDMONDSON CMA

## 2020-03-24 NOTE — TELEPHONE ENCOUNTER
As I haven't seen patient since 5/2019, the best thing would be to set up a telephone visit with either her PCP, Dr tellez or me.

## 2020-04-07 ENCOUNTER — MEDICAL CORRESPONDENCE (OUTPATIENT)
Dept: HEALTH INFORMATION MANAGEMENT | Facility: CLINIC | Age: 40
End: 2020-04-07

## 2020-04-22 ENCOUNTER — VIRTUAL VISIT (OUTPATIENT)
Dept: PEDIATRICS | Facility: CLINIC | Age: 40
End: 2020-04-22
Payer: COMMERCIAL

## 2020-04-22 DIAGNOSIS — N30.00 ACUTE CYSTITIS WITHOUT HEMATURIA: Primary | ICD-10-CM

## 2020-04-22 DIAGNOSIS — N76.0 VAGINITIS AND VULVOVAGINITIS: ICD-10-CM

## 2020-04-22 PROBLEM — O09.899 SHORT INTERVAL BETWEEN PREGNANCIES AFFECTING PREGNANCY, ANTEPARTUM: Status: RESOLVED | Noted: 2019-06-07 | Resolved: 2020-04-22

## 2020-04-22 PROBLEM — O09.299 HX OF MACROSOMIA IN INFANT IN PRIOR PREGNANCY, CURRENTLY PREGNANT: Status: RESOLVED | Noted: 2019-06-07 | Resolved: 2020-04-22

## 2020-04-22 PROCEDURE — 99213 OFFICE O/P EST LOW 20 MIN: CPT | Mod: 95 | Performed by: FAMILY MEDICINE

## 2020-04-22 RX ORDER — FLUCONAZOLE 150 MG/1
150 TABLET ORAL
Qty: 3 TABLET | Refills: 0 | Status: SHIPPED | OUTPATIENT
Start: 2020-04-22 | End: 2020-06-03

## 2020-04-22 RX ORDER — SULFAMETHOXAZOLE/TRIMETHOPRIM 800-160 MG
1 TABLET ORAL 2 TIMES DAILY
Qty: 6 TABLET | Refills: 0 | Status: SHIPPED | OUTPATIENT
Start: 2020-04-22 | End: 2020-06-03

## 2020-04-22 ASSESSMENT — PAIN SCALES - GENERAL: PAINLEVEL: EXTREME PAIN (9)

## 2020-04-22 NOTE — PATIENT INSTRUCTIONS
"Patient Education     Bladder Infection, Female (Adult)    Urine is normally doesn't have any bacteria in it. But bacteria can get into the urinary tract from the skin around the rectum. Or they can travel in the blood from elsewhere in the body. Once they are in your urinary tract, they can cause infection in the urethra (urethritis), the bladder (cystitis), or the kidneys (pyelonephritis).  The most common place for an infection is in the bladder. This is called a bladder infection. This is one of the most common infections in women. Most bladder infections are easily treated. They are not serious unless the infection spreads to the kidney.  The phrases \"bladder infection,\" \"UTI,\" and \"cystitis\" are often used to describe the same thing. But they are not always the same. Cystitis is an inflammation of the bladder. The most common cause of cystitis is an infection.  Symptoms  The infection causes inflammation in the urethra and bladder. This causes many of the symptoms. The most common symptoms of a bladder infection are:    Pain or burning when urinating    Having to urinate more often than usual    Urgent need to urinate    Only a small amount of urine comes out    Blood in urine    Abdominal discomfort. This is usually in the lower abdomen above the pubic bone.    Cloudy urine    Strong- or bad-smelling urine    Unable to urinate (urinary retention)    Unable to hold urine in (urinary incontinence)    Fever    Loss of appetite    Confusion (in older adults)  Causes  Bladder infections are not contagious. You can't get one from someone else, from a toilet seat, or from sharing a bath.  The most common cause of bladder infections is bacteria from the bowels. The bacteria get onto the skin around the opening of the urethra. From there, they can get into the urine and travel up to the bladder, causing inflammation and infection. This usually happens because of:    Wiping improperly after urinating. Always wipe from " front to back.    Bowel incontinence    Pregnancy    Procedures such as having a catheter inserted    Older age    Not emptying your bladder. This can allow bacteria a chance to grow in your urine.    Dehydration    Constipation    Sex    Use of a diaphragm for birth control   Treatment  Bladder infections are diagnosed by a urine test. They are treated with antibiotics and usually clear up quickly without complications. Treatment helps prevent a more serious kidney infection.  Medicines  Medicines can help in the treatment of a bladder infection:    Take antibiotics until they are used up, even if you feel better. It is important to finish them to make sure the infection has cleared.    You can use acetaminophen or ibuprofen for pain, fever, or discomfort, unless another medicine was prescribed. If you have chronic liver or kidney disease, talk with your healthcare provider before using these medicines. Also talk with your provider if you've ever had a stomach ulcer or gastrointestinal bleeding, or are taking blood-thinner medicines.    If you are given phenazopydridine to reduce burning with urination, it will cause your urine to become a bright orange color. This can stain clothing.  Care and prevention  These self-care steps can help prevent future infections:    Drink plenty of fluids to prevent dehydration and flush out your bladder. Do this unless you must restrict fluids for other health reasons, or your doctor told you not to.    Proper cleaning after going to the bathroom is important. Wipe from front to back after using the toilet to prevent the spread of bacteria.    Urinate more often. Don't try to hold urine in for a long time.    Wear loose-fitting clothes and cotton underwear. Avoid tight-fitting pants.    Improve your diet and prevent constipation. Eat more fresh fruit and vegetables, and fiber, and less junk and fatty foods.    Avoid sex until your symptoms are gone.    Avoid caffeine, alcohol, and  spicy foods. These can irritate your bladder.    Urinate right after intercourse to flush out your bladder.    If you use birth control pills and have frequent bladder infections, discuss it with your doctor.  Follow-up care  Call your healthcare provider if all symptoms are not gone after 3 days of treatment. This is especially important if you have repeat infections.  If a culture was done, you will be told if your treatment needs to be changed. If directed, you can call to find out the results.  If X-rays were done, you will be told if the results will affect your treatment.  Call 911  Call 911 if any of the following occur:    Trouble breathing    Hard to wake up or confusion    Fainting or loss of consciousness    Rapid heart rate  When to seek medical advice  Call your healthcare provider right away if any of these occur:    Fever of 100.4 F (38.0 C) or higher, or as directed by your healthcare provider    Symptoms are not better by the third day of treatment    Back or belly (abdominal) pain that gets worse    Repeated vomiting, or unable to keep medicine down    Weakness or dizziness    Vaginal discharge    Pain, redness, or swelling in the outer vaginal area (labia)  Date Last Reviewed: 10/1/2016    6425-3294 [employer]. [address] [place] [address], [place] [address]. All rights reserved. This information is not intended as a substitute for professional medical care. Always follow your healthcare professional's instructions.

## 2020-04-22 NOTE — PROGRESS NOTES
"Tanya Denny is a 40 year old female who is being evaluated via a billable telephone visit.      The patient has been notified of following:     \"This telephone visit will be conducted via a call between you and your physician/provider. We have found that certain health care needs can be provided without the need for a physical exam.  This service lets us provide the care you need with a short phone conversation.  If a prescription is necessary we can send it directly to your pharmacy.  If lab work is needed we can place an order for that and you can then stop by our lab to have the test done at a later time.    Telephone visits are billed at different rates depending on your insurance coverage. During this emergency period, for some insurers they may be billed the same as an in-person visit.  Please reach out to your insurance provider with any questions.    If during the course of the call the physician/provider feels a telephone visit is not appropriate, you will not be charged for this service.\"    Patient has given verbal consent for Telephone visit?  Yes    How would you like to obtain your AVS? Hiteshhart    Subjective     Tanya Denny is a 40 year old female who presents to clinic today for the following health issues:    HPI     Urinary Symptoms    Duration: 1 week     Description  burning, pelvic pain.     Intensity:  mild    Accompanying signs and symptoms (fever/dysuria/abdominal or back pain): Patient also reports frequent urination, burning and itching with urination. Patient reports initially started with abdominal pain.    History  Sexually active: yes   Possibility of pregnancy: No. Patient's last menstrual period was 04/10/2020.  Recent antibiotic use: no     Precipitating or alleviating factors: None    Therapies tried and outcome: Monistat 7 days treatment - patient finished last does yesterday. Patient reports only relief she got was decrease in abdominal pain.    "       Patient Active Problem List   Diagnosis     CARDIOVASCULAR SCREENING; LDL GOAL LESS THAN 160     Dyspepsia     Lumbago     Chronic mixed headache syndrome     Uterine leiomyoma, unspecified location     AMA (advanced maternal age) multigravida 35+     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     Past Surgical History:   Procedure Laterality Date     D & C  12/05       Social History     Tobacco Use     Smoking status: Never Smoker     Smokeless tobacco: Never Used   Substance Use Topics     Alcohol use: No     Family History   Problem Relation Age of Onset     Allergies Mother         angioedema in family members unspecified     Diabetes Mother      Hypertension Mother      Diabetes Father      Hypertension Father      Diabetes Maternal Grandmother      Hypertension Maternal Grandmother      Asthma Maternal Grandmother      Diabetes Maternal Grandfather      Hypertension Maternal Grandfather      Asthma Maternal Grandfather      Cancer No family hx of      Cerebrovascular Disease No family hx of      Thyroid Disease No family hx of      Glaucoma No family hx of      Macular Degeneration No family hx of      Breast Cancer No family hx of      Colon Cancer No family hx of      Depression No family hx of      Anxiety Disorder No family hx of      Genetic Disorder No family hx of          Current Outpatient Medications   Medication Sig Dispense Refill     fluconazole (DIFLUCAN) 150 MG tablet Take 1 tablet (150 mg) by mouth every 72 hours 3 tablet 0     Prenatal Vit-Iron Carbonyl-FA (PRENATABS RX) 29-1 MG TABS TAKE 1 TABLET BY MOUTH EVERY DAY 30 tablet 23     sulfamethoxazole-trimethoprim (BACTRIM DS) 800-160 MG tablet Take 1 tablet by mouth 2 times daily for 3 days 6 tablet 0     acetaminophen (TYLENOL) 325 MG tablet Take 2 tablets (650 mg) by mouth every 4 hours as needed for mild pain (Patient not taking: Reported on 4/22/2020) 100 tablet 0     Cholecalciferol (VITAMIN D PO) Take 1,000 Units by mouth daily       ferrous sulfate (IRON)  325 (65 FE) MG tablet Take 1 tablet (325 mg) by mouth 3 times daily (with meals) (Patient not taking: Reported on 4/22/2020) 90 tablet 2     guaiFENesin (ORGANIDIN) 200 MG tablet Take 1 tablet (200 mg) by mouth every 4 hours as needed for cough or other (congestion) (Patient not taking: Reported on 4/22/2020) 20 tablet 1     naproxen (NAPROSYN) 500 MG tablet TAKE 1 TABLET (500 MG) BY MOUTH 2 TIMES DAILY (WITH MEALS) (Patient not taking: Reported on 4/22/2020) 60 tablet 0     omeprazole 20 MG tablet Take 1 tablet (20 mg) by mouth daily (Patient not taking: Reported on 4/22/2020) 30 tablet 3     No Known Allergies  Recent Labs   Lab Test 06/07/19  1439 12/22/17  0753 08/28/17  2112 07/05/17  1508  05/06/16  1030 03/27/16  1212   A1C 4.9 5.2  --   --   --  5.0  --    LDL  --   --   --   --   --  82  --    HDL  --   --   --   --   --  61  --    TRIG  --   --   --   --   --  63  --    ALT  --  24  --  15  --   --  20   CR  --  0.73 0.64 0.72   < >  --  0.66   GFRESTIMATED  --  >90 >90 >90  Non African American GFR Calc     < >  --  >90  Non  GFR Calc     GFRESTBLACK  --  >90 >90 >90  African American GFR Calc     < >  --  >90   GFR Calc     POTASSIUM  --  4.1 3.2* 3.9   < >  --  4.3   TSH  --  1.49  --  0.98   < >  --   --     < > = values in this interval not displayed.        Reviewed and updated as needed this visit by Provider  Problems  Med Hx  Surg Hx  Fam Hx         Review of Systems   ROS COMP: Constitutional, HEENT, cardiovascular, pulmonary, gi and gu systems are negative, except as otherwise noted.       Objective   Reported vitals:  LMP 04/10/2020    healthy, alert and no distress  PSYCH: Alert and oriented times 3; coherent speech, normal   rate and volume, able to articulate logical thoughts, able   to abstract reason, no tangential thoughts, no hallucinations   or delusions  Her affect is normal  RESP: No cough, no audible wheezing, able to talk in full  sentences  Remainder of exam unable to be completed due to telephone visits    Diagnostic Test Results:  Labs reviewed in Epic        Assessment/Plan:  1. Acute cystitis without hematuria  Push fluids  Return to clinic if symptoms persist for urinalysis with urine culture and sensitivity and wet prep.  - sulfamethoxazole-trimethoprim (BACTRIM DS) 800-160 MG tablet; Take 1 tablet by mouth 2 times daily for 3 days  Dispense: 6 tablet; Refill: 0    2. Vaginitis and vulvovaginitis  - fluconazole (DIFLUCAN) 150 MG tablet; Take 1 tablet (150 mg) by mouth every 72 hours  Dispense: 3 tablet; Refill: 0    Return if symptoms worsen or fail to improve.      Phone call duration:  15 minutes    Mele Floyd MD

## 2020-06-03 ENCOUNTER — VIRTUAL VISIT (OUTPATIENT)
Dept: PEDIATRICS | Facility: CLINIC | Age: 40
End: 2020-06-03
Payer: COMMERCIAL

## 2020-06-03 DIAGNOSIS — N89.8 VAGINAL DISCHARGE: ICD-10-CM

## 2020-06-03 DIAGNOSIS — N76.0 VAGINITIS AND VULVOVAGINITIS: Primary | ICD-10-CM

## 2020-06-03 DIAGNOSIS — R30.0 DYSURIA: ICD-10-CM

## 2020-06-03 LAB
ALBUMIN UR-MCNC: NEGATIVE MG/DL
APPEARANCE UR: CLEAR
BACTERIA #/AREA URNS HPF: ABNORMAL /HPF
BILIRUB UR QL STRIP: NEGATIVE
COLOR UR AUTO: YELLOW
GLUCOSE UR STRIP-MCNC: NEGATIVE MG/DL
HGB UR QL STRIP: NEGATIVE
KETONES UR STRIP-MCNC: NEGATIVE MG/DL
LEUKOCYTE ESTERASE UR QL STRIP: NEGATIVE
MUCOUS THREADS #/AREA URNS LPF: PRESENT /LPF
NITRATE UR QL: NEGATIVE
NON-SQ EPI CELLS #/AREA URNS LPF: ABNORMAL /LPF
PH UR STRIP: 6 PH (ref 5–7)
RBC #/AREA URNS AUTO: ABNORMAL /HPF
SOURCE: ABNORMAL
SP GR UR STRIP: 1.01 (ref 1–1.03)
SPECIMEN SOURCE: NORMAL
UROBILINOGEN UR STRIP-MCNC: NORMAL MG/DL (ref 0–2)
WBC #/AREA URNS AUTO: ABNORMAL /HPF
WET PREP SPEC: NORMAL

## 2020-06-03 PROCEDURE — 87210 SMEAR WET MOUNT SALINE/INK: CPT | Performed by: FAMILY MEDICINE

## 2020-06-03 PROCEDURE — 81001 URINALYSIS AUTO W/SCOPE: CPT | Performed by: FAMILY MEDICINE

## 2020-06-03 PROCEDURE — 99213 OFFICE O/P EST LOW 20 MIN: CPT | Mod: 95 | Performed by: FAMILY MEDICINE

## 2020-06-03 RX ORDER — CLOTRIMAZOLE 1 %
1 CREAM WITH APPLICATOR VAGINAL AT BEDTIME
Qty: 0.3 G | Refills: 0 | Status: SHIPPED | OUTPATIENT
Start: 2020-06-03 | End: 2020-06-10

## 2020-06-03 NOTE — PROGRESS NOTES
"Tanya Denny is a 40 year old female who is being evaluated via a billable telephone visit.      The patient has been notified of following:     \"This telephone visit will be conducted via a call between you and your physician/provider. We have found that certain health care needs can be provided without the need for a physical exam.  This service lets us provide the care you need with a short phone conversation.  If a prescription is necessary we can send it directly to your pharmacy.  If lab work is needed we can place an order for that and you can then stop by our lab to have the test done at a later time.    Telephone visits are billed at different rates depending on your insurance coverage. During this emergency period, for some insurers they may be billed the same as an in-person visit.  Please reach out to your insurance provider with any questions.    If during the course of the call the physician/provider feels a telephone visit is not appropriate, you will not be charged for this service.\"    Patient has given verbal consent for Telephone visit?  Yes    What phone number would you like to be contacted at?     How would you like to obtain your AVS? MyChart    Subjective     Tanya Denny is a 40 year old female who presents via phone visit today for the following health issues:    HPI  URINARY TRACT SYMPTOMS      Duration: 1 month    Description  dysuria and itching    Intensity:  mild    Accompanying signs and symptoms:  Fever/chills: no   Flank pain no   Nausea and vomiting: no   Vaginal symptoms: itching  Abdominal/Pelvic Pain: no     History  History of frequent UTI's: no   History of kidney stones: no   Sexually Active: YES  Possibility of pregnancy: No menstrual date 5/8/2020    Precipitating or alleviating factors: None    Therapies tried and outcome: pt took monistat for 7 days still having itching       Patient Active Problem List   Diagnosis     CARDIOVASCULAR SCREENING; LDL " GOAL LESS THAN 160     Dyspepsia     Lumbago     Chronic mixed headache syndrome     Uterine leiomyoma, unspecified location     AMA (advanced maternal age) multigravida 35+     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     Past Surgical History:   Procedure Laterality Date     D & C  12/05       Social History     Tobacco Use     Smoking status: Never Smoker     Smokeless tobacco: Never Used   Substance Use Topics     Alcohol use: No     Family History   Problem Relation Age of Onset     Allergies Mother         angioedema in family members unspecified     Diabetes Mother      Hypertension Mother      Diabetes Father      Hypertension Father      Diabetes Maternal Grandmother      Hypertension Maternal Grandmother      Asthma Maternal Grandmother      Diabetes Maternal Grandfather      Hypertension Maternal Grandfather      Asthma Maternal Grandfather      Cancer No family hx of      Cerebrovascular Disease No family hx of      Thyroid Disease No family hx of      Glaucoma No family hx of      Macular Degeneration No family hx of      Breast Cancer No family hx of      Colon Cancer No family hx of      Depression No family hx of      Anxiety Disorder No family hx of      Genetic Disorder No family hx of          Current Outpatient Medications   Medication Sig Dispense Refill     clotrimazole (LOTRIMIN) 1 % vaginal cream Place 1 Applicatorful vaginally At Bedtime for 7 days 0.3 g 0     Cholecalciferol (VITAMIN D PO) Take 1,000 Units by mouth daily       Prenatal Vit-Iron Carbonyl-FA (PRENATABS RX) 29-1 MG TABS TAKE 1 TABLET BY MOUTH EVERY DAY 30 tablet 23     No Known Allergies  Recent Labs   Lab Test 06/07/19  1439 12/22/17  0753 08/28/17  2112 07/05/17  1508  05/06/16  1030 03/27/16  1212   A1C 4.9 5.2  --   --   --  5.0  --    LDL  --   --   --   --   --  82  --    HDL  --   --   --   --   --  61  --    TRIG  --   --   --   --   --  63  --    ALT  --  24  --  15  --   --  20   CR  --  0.73 0.64 0.72   < >  --  0.66   GFRESTIMATED  --   >90 >90 >90  Non African American GFR Calc     < >  --  >90  Non  GFR Calc     GFRESTBLACK  --  >90 >90 >90  African American GFR Calc     < >  --  >90   GFR Calc     POTASSIUM  --  4.1 3.2* 3.9   < >  --  4.3   TSH  --  1.49  --  0.98   < >  --   --     < > = values in this interval not displayed.      BP Readings from Last 3 Encounters:   06/07/19 109/68   05/17/19 100/60   04/01/19 109/73    Wt Readings from Last 3 Encounters:   06/07/19 91 kg (200 lb 9.6 oz)   05/17/19 88.5 kg (195 lb)   04/01/19 90.4 kg (199 lb 6.4 oz)                    Reviewed and updated as needed this visit by Provider         Review of Systems   Constitutional, HEENT, cardiovascular, pulmonary, gi and gu systems are negative, except as otherwise noted.       Objective   Reported vitals:  There were no vitals taken for this visit.   healthy, alert and no distress  PSYCH: Alert and oriented times 3; coherent speech, normal   rate and volume, able to articulate logical thoughts, able   to abstract reason, no tangential thoughts, no hallucinations   or delusions  Her affect is normal  RESP: No cough, no audible wheezing, able to talk in full sentences  Remainder of exam unable to be completed due to telephone visits        Results for orders placed or performed in visit on 06/03/20   UA with Microscopic reflex to Culture (Enedina Aldana; [facility])     Status: Abnormal    Specimen: Midstream Urine   Result Value Ref Range    Color Urine Yellow     Appearance Urine Clear     Glucose Urine Negative NEG^Negative mg/dL    Bilirubin Urine Negative NEG^Negative    Ketones Urine Negative NEG^Negative mg/dL    Specific Gravity Urine 1.014 1.003 - 1.035    Blood Urine Negative NEG^Negative    pH Urine 6.0 5.0 - 7.0 pH    Protein Albumin Urine Negative NEG^Negative mg/dL    Urobilinogen mg/dL Normal 0.0 - 2.0 mg/dL    Nitrite Urine Negative NEG^Negative    Leukocyte Esterase Urine Negative NEG^Negative    Source  Midstream Urine     WBC Urine 0 - 5 OTO5^0 - 5 /HPF    RBC Urine O - 2 OTO2^O - 2 /HPF    Bacteria Urine Few (A) NEG^Negative /HPF    Squamous Epithelial /LPF Urine Few FEW^Few /LPF    Mucous Urine Present (A) NEG^Negative /LPF   Wet prep     Status: None    Specimen: Vagina   Result Value Ref Range    Specimen Description Vagina     Wet Prep No Trichomonas seen     Wet Prep No clue cells seen     Wet Prep No yeast seen     Wet Prep Few  WBC'S seen          Assessment/Plan:  1. Vaginitis and vulvovaginitis  - clotrimazole (LOTRIMIN) 1 % vaginal cream; Place 1 Applicatorful vaginally At Bedtime for 7 days  Dispense: 0.3 g; Refill: 0    No follow-ups on file.      Phone call duration:  8 minutes    Mele Floyd MD

## 2020-07-21 ENCOUNTER — OFFICE VISIT (OUTPATIENT)
Dept: PEDIATRICS | Facility: CLINIC | Age: 40
End: 2020-07-21
Payer: COMMERCIAL

## 2020-07-21 ENCOUNTER — ANCILLARY PROCEDURE (OUTPATIENT)
Dept: GENERAL RADIOLOGY | Facility: CLINIC | Age: 40
End: 2020-07-21
Attending: FAMILY MEDICINE
Payer: COMMERCIAL

## 2020-07-21 VITALS
BODY MASS INDEX: 34.09 KG/M2 | OXYGEN SATURATION: 96 % | HEART RATE: 64 BPM | SYSTOLIC BLOOD PRESSURE: 106 MMHG | WEIGHT: 199.7 LBS | DIASTOLIC BLOOD PRESSURE: 72 MMHG | TEMPERATURE: 97.7 F

## 2020-07-21 DIAGNOSIS — M54.6 CHRONIC MIDLINE THORACIC BACK PAIN: ICD-10-CM

## 2020-07-21 DIAGNOSIS — G89.29 CHRONIC BILATERAL LOW BACK PAIN WITH LEFT-SIDED SCIATICA: Primary | ICD-10-CM

## 2020-07-21 DIAGNOSIS — G89.29 CHRONIC BILATERAL LOW BACK PAIN WITH LEFT-SIDED SCIATICA: ICD-10-CM

## 2020-07-21 DIAGNOSIS — N91.2 ABSENCE OF MENSTRUATION: ICD-10-CM

## 2020-07-21 DIAGNOSIS — M54.42 CHRONIC BILATERAL LOW BACK PAIN WITH LEFT-SIDED SCIATICA: Primary | ICD-10-CM

## 2020-07-21 DIAGNOSIS — M54.42 CHRONIC BILATERAL LOW BACK PAIN WITH LEFT-SIDED SCIATICA: ICD-10-CM

## 2020-07-21 DIAGNOSIS — G89.29 CHRONIC MIDLINE THORACIC BACK PAIN: ICD-10-CM

## 2020-07-21 LAB
CRP SERPL-MCNC: <2.9 MG/L (ref 0–8)
ERYTHROCYTE [SEDIMENTATION RATE] IN BLOOD BY WESTERGREN METHOD: 60 MM/H (ref 0–20)
HCG UR QL: NEGATIVE

## 2020-07-21 PROCEDURE — 36415 COLL VENOUS BLD VENIPUNCTURE: CPT | Performed by: FAMILY MEDICINE

## 2020-07-21 PROCEDURE — 81025 URINE PREGNANCY TEST: CPT | Performed by: FAMILY MEDICINE

## 2020-07-21 PROCEDURE — 86431 RHEUMATOID FACTOR QUANT: CPT | Performed by: FAMILY MEDICINE

## 2020-07-21 PROCEDURE — 86038 ANTINUCLEAR ANTIBODIES: CPT | Performed by: FAMILY MEDICINE

## 2020-07-21 PROCEDURE — 86140 C-REACTIVE PROTEIN: CPT | Performed by: FAMILY MEDICINE

## 2020-07-21 PROCEDURE — 99213 OFFICE O/P EST LOW 20 MIN: CPT | Performed by: FAMILY MEDICINE

## 2020-07-21 PROCEDURE — 85652 RBC SED RATE AUTOMATED: CPT | Performed by: FAMILY MEDICINE

## 2020-07-21 RX ORDER — NAPROXEN 500 MG/1
500 TABLET ORAL 2 TIMES DAILY WITH MEALS
Qty: 30 TABLET | Refills: 0 | Status: SHIPPED | OUTPATIENT
Start: 2020-07-21 | End: 2021-07-09

## 2020-07-21 RX ORDER — METHOCARBAMOL 500 MG/1
500 TABLET, FILM COATED ORAL 4 TIMES DAILY PRN
Qty: 45 TABLET | Refills: 1 | Status: SHIPPED | OUTPATIENT
Start: 2020-07-21 | End: 2020-08-11

## 2020-07-21 RX ORDER — PREDNISONE 20 MG/1
40 TABLET ORAL DAILY
Qty: 10 TABLET | Refills: 0 | Status: SHIPPED | OUTPATIENT
Start: 2020-07-21 | End: 2020-07-26

## 2020-07-21 NOTE — PATIENT INSTRUCTIONS
Patient Education     Understanding Lumbar Radiculopathy    Lumbar radiculopathy is irritation or inflammation of a nerve root in the low back. It causes symptoms that spread out from the back down one or both legs. To understand this condition, it helps to understand the parts of the spine:    Vertebrae. These are bones that stack to form the spine. The lumbar spine contains the 5 bottom vertebrae.    Disks. These are soft pads of tissue between the vertebrae. They act as shock absorbers for the spine.    Spinal canal. This is a tunnel formed within the stacked vertebrae. In the lumbar spine, nerves run through this canal.    Nerves. These branch off and leave the spinal canal, traveling out to parts of the body. As they leave the spinal canal, nerves pass through openings between the vertebrae. The nerve root is the part of the nerve that is closest to the spinal canal.    Sciatic nerve. This is a large nerve formed from several nerve roots in the low back. This nerve extends down the back of the leg to the foot.  With lumbar radiculopathy, nerve roots in the low back become irritated. This leads to pain and symptoms. The sciatic nerve is commonly involved, so the condition is often called sciatica.  What causes lumbar radiculopathy?  Aging, injury, poor posture, extra body weight, and other issues can lead to problems in the low back. These problems may then irritate nerve roots. They include:    Damage to a disk in the lumbar spine. The damaged disk may then press on nearby nerve roots.    Degeneration from wear and tear, and aging. This can lead to narrowing (stenosis) of the openings between the vertebrae. The narrowed openings press on nerve roots as they leave the spinal canal.    Unstable spine. This is when a vertebra slips forward. It can then press on a nerve root.  Other, less common things can put pressure on nerves in the low back. These include diabetes, infection, or a tumor.  Symptoms of lumbar  radiculopathy  These include:    Pain in the low back    Pain, numbness, tingling, or weakness that travels into the buttocks, hip, groin, or leg    Muscle spasms  Treatment for lumbar radiculopathy  In most cases, your healthcare provider will first try treatments that help relieve symptoms. These may include:    Prescription and over-the-counter pain medicines. These help relieve pain, swelling, and irritation.    Limits on positions and activities that increase pain. But lying in bed or avoiding all movement is only recommended for a short period of time.    Physical therapy, including exercises and stretches. This helps decrease pain and increase movement and function.    Steroid shots into the lower back. This may help relieve symptoms for a time.    Weight-loss program. If you are overweight, losing extra pounds may help relieve symptoms.  In some cases, you may need surgery to fix the underlying problem. This depends on the cause, the symptoms, and how long the pain has lasted.  Possible complications  Over time, an irritated and inflamed nerve may become damaged. This may lead to long-lasting (permanent) numbness or weakness in your legs and feet. If symptoms change suddenly or get worse, be sure to let your healthcare provider know.  When to call your healthcare provider  Call your healthcare provider right away if you have any of these:    New pain or pain that gets worse    New or increasing weakness, tingling, or numbness in your leg or foot    Problems controlling your bladder or bowel   Date Last Reviewed: 3/10/2016    6433-7911 [employer]. [address] [place] [address], [place] [address]. All rights reserved. This information is not intended as a substitute for professional medical care. Always follow your healthcare professional's instructions.

## 2020-07-21 NOTE — PROGRESS NOTES
Subjective     Tanya Denny is a 40 year old female who presents to clinic today for the following health issues:    HPI       Back Pain       Duration: 2 month        Specific cause: none    Description:   Location of pain: low back bilateral, middle of back bilateral and upper back bilateral  Character of pain: sharp and dull ache  Pain radiation:radiates into the left leg  New numbness or weakness in legs, not attributed to pain:  no     Intensity: Currently 9/10, At its worst 10/10    History:   Pain interferes with job: YES, No  History of back problems: yes  Any previous MRI or X-rays: None  Sees a specialist for back pain:  No  Therapies tried without relief: cold, heat, Physical Therapy, rest and stretch    Alleviating factors:   Improved by: none      Precipitating factors:  Worsened by: Standing, Sitting and Walking    No trauma or alarming symptoms, worse since 2 child birth span of a year.      Patient Active Problem List   Diagnosis     CARDIOVASCULAR SCREENING; LDL GOAL LESS THAN 160     Dyspepsia     Lumbago     Chronic mixed headache syndrome     Uterine leiomyoma, unspecified location     AMA (advanced maternal age) multigravida 35+     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     Past Surgical History:   Procedure Laterality Date     D & C  12/05       Social History     Tobacco Use     Smoking status: Never Smoker     Smokeless tobacco: Never Used   Substance Use Topics     Alcohol use: No     Family History   Problem Relation Age of Onset     Allergies Mother         angioedema in family members unspecified     Diabetes Mother      Hypertension Mother      Diabetes Father      Hypertension Father      Diabetes Maternal Grandmother      Hypertension Maternal Grandmother      Asthma Maternal Grandmother      Diabetes Maternal Grandfather      Hypertension Maternal Grandfather      Asthma Maternal Grandfather      Cancer No family hx of      Cerebrovascular Disease No family hx of      Thyroid Disease No family  hx of      Glaucoma No family hx of      Macular Degeneration No family hx of      Breast Cancer No family hx of      Colon Cancer No family hx of      Depression No family hx of      Anxiety Disorder No family hx of      Genetic Disorder No family hx of          Current Outpatient Medications   Medication Sig Dispense Refill     Cholecalciferol (VITAMIN D PO) Take 1,000 Units by mouth daily       methocarbamol (ROBAXIN) 500 MG tablet Take 1 tablet (500 mg) by mouth 4 times daily as needed for muscle spasms 45 tablet 1     naproxen (NAPROSYN) 500 MG tablet Take 1 tablet (500 mg) by mouth 2 times daily (with meals) 30 tablet 0     predniSONE (DELTASONE) 20 MG tablet Take 2 tablets (40 mg) by mouth daily for 5 days 10 tablet 0     Prenatal Vit-Iron Carbonyl-FA (PRENATABS RX) 29-1 MG TABS TAKE 1 TABLET BY MOUTH EVERY DAY 30 tablet 23     No Known Allergies  Recent Labs   Lab Test 06/07/19  1439 12/22/17  0753 08/28/17  2112 07/05/17  1508  05/06/16  1030 03/27/16  1212   A1C 4.9 5.2  --   --   --  5.0  --    LDL  --   --   --   --   --  82  --    HDL  --   --   --   --   --  61  --    TRIG  --   --   --   --   --  63  --    ALT  --  24  --  15  --   --  20   CR  --  0.73 0.64 0.72   < >  --  0.66   GFRESTIMATED  --  >90 >90 >90  Non African American GFR Calc     < >  --  >90  Non  GFR Calc     GFRESTBLACK  --  >90 >90 >90  African American GFR Calc     < >  --  >90   GFR Calc     POTASSIUM  --  4.1 3.2* 3.9   < >  --  4.3   TSH  --  1.49  --  0.98   < >  --   --     < > = values in this interval not displayed.      BP Readings from Last 3 Encounters:   07/21/20 106/72   06/07/19 109/68   05/17/19 100/60    Wt Readings from Last 3 Encounters:   07/21/20 90.6 kg (199 lb 11.2 oz)   06/07/19 91 kg (200 lb 9.6 oz)   05/17/19 88.5 kg (195 lb)                    Reviewed and updated as needed this visit by Provider  Med Hx  Surg Hx  Fam Hx         Review of Systems   Constitutional, HEENT,  cardiovascular, pulmonary, GI, , musculoskeletal, neuro, skin, endocrine and psych systems are negative, except as otherwise noted.      Objective    /72 (BP Location: Right arm, Patient Position: Sitting, Cuff Size: Adult Large)   Pulse 64   Temp 97.7  F (36.5  C) (Temporal)   Wt 90.6 kg (199 lb 11.2 oz)   SpO2 96%   BMI 34.09 kg/m    Body mass index is 34.09 kg/m .  Physical Exam  Musculoskeletal:      Thoracic back: She exhibits tenderness and spasm.      Lumbar back: She exhibits tenderness, bony tenderness and spasm.        GENERAL: healthy, alert and no distress            Assessment & Plan     1. Chronic bilateral low back pain with left-sided sciatica  Alternate physical therapy ice and heat  - XR Lumbar Spine 2/3 Views; Future  - ESR: Erythrocyte sedimentation rate  - CRP, inflammation  - Anti Nuclear Rylie IgG by IFA with Reflex  - Rheumatoid factor  - methocarbamol (ROBAXIN) 500 MG tablet; Take 1 tablet (500 mg) by mouth 4 times daily as needed for muscle spasms  Dispense: 45 tablet; Refill: 1  - predniSONE (DELTASONE) 20 MG tablet; Take 2 tablets (40 mg) by mouth daily for 5 days  Dispense: 10 tablet; Refill: 0  - naproxen (NAPROSYN) 500 MG tablet; Take 1 tablet (500 mg) by mouth 2 times daily (with meals)  Dispense: 30 tablet; Refill: 0  - HCG Qual, Urine ([id number])  - XR Thoracic Lumbar Spine 2 Views; Future  - [facility] REFERRAL; Future    2. Chronic midline thoracic back pain  - XR Lumbar Spine 2/3 Views; Future  - ESR: Erythrocyte sedimentation rate  - CRP, inflammation  - Anti Nuclear Rylie IgG by IFA with Reflex  - Rheumatoid factor  - methocarbamol (ROBAXIN) 500 MG tablet; Take 1 tablet (500 mg) by mouth 4 times daily as needed for muscle spasms  Dispense: 45 tablet; Refill: 1  - predniSONE (DELTASONE) 20 MG tablet; Take 2 tablets (40 mg) by mouth daily for 5 days  Dispense: 10 tablet; Refill: 0  - naproxen (NAPROSYN) 500 MG tablet; Take 1 tablet (500 mg) by mouth 2  times daily (with meals)  Dispense: 30 tablet; Refill: 0  - HCG Qual, Urine ([id number])  - XR Thoracic Lumbar Spine 2 Views; Future  - [facility] REFERRAL; Future    3. Absence of menstruation  - HCG Qual, Urine ([id number])       See Patient Instructions    No follow-ups on file.    Mele Floyd MD  [facility]

## 2020-07-22 LAB
ANA SER QL IF: NEGATIVE
RHEUMATOID FACT SER NEPH-ACNC: 8 IU/ML (ref 0–20)

## 2020-08-01 ENCOUNTER — MEDICAL CORRESPONDENCE (OUTPATIENT)
Dept: HEALTH INFORMATION MANAGEMENT | Facility: CLINIC | Age: 40
End: 2020-08-01

## 2020-08-11 ENCOUNTER — VIRTUAL VISIT (OUTPATIENT)
Dept: PEDIATRICS | Facility: CLINIC | Age: 40
End: 2020-08-11
Payer: COMMERCIAL

## 2020-08-11 DIAGNOSIS — M54.6 CHRONIC MIDLINE THORACIC BACK PAIN: ICD-10-CM

## 2020-08-11 DIAGNOSIS — G89.29 CHRONIC BILATERAL LOW BACK PAIN WITH LEFT-SIDED SCIATICA: ICD-10-CM

## 2020-08-11 DIAGNOSIS — M99.03 SOMATIC DYSFUNCTION OF LUMBAR REGION: ICD-10-CM

## 2020-08-11 DIAGNOSIS — R70.0 ELEVATED ERYTHROCYTE SEDIMENTATION RATE: Primary | ICD-10-CM

## 2020-08-11 DIAGNOSIS — M54.42 CHRONIC BILATERAL LOW BACK PAIN WITH LEFT-SIDED SCIATICA: ICD-10-CM

## 2020-08-11 DIAGNOSIS — G89.29 CHRONIC MIDLINE THORACIC BACK PAIN: ICD-10-CM

## 2020-08-11 PROCEDURE — 99214 OFFICE O/P EST MOD 30 MIN: CPT | Mod: 95 | Performed by: FAMILY MEDICINE

## 2020-08-11 RX ORDER — METHOCARBAMOL 500 MG/1
500 TABLET, FILM COATED ORAL 4 TIMES DAILY PRN
Qty: 45 TABLET | Refills: 1 | Status: SHIPPED | OUTPATIENT
Start: 2020-08-11 | End: 2021-07-09

## 2020-08-11 RX ORDER — LIDOCAINE 50 MG/G
1 PATCH TOPICAL EVERY 24 HOURS
Qty: 12 PATCH | Refills: 1 | Status: SHIPPED | OUTPATIENT
Start: 2020-08-11 | End: 2021-09-24

## 2020-08-11 NOTE — PROGRESS NOTES
"Tanya Denny is a 40 year old female who is being evaluated via a billable telephone visit.      The patient has been notified of following:     \"This telephone visit will be conducted via a call between you and your physician/provider. We have found that certain health care needs can be provided without the need for a physical exam.  This service lets us provide the care you need with a short phone conversation.  If a prescription is necessary we can send it directly to your pharmacy.  If lab work is needed we can place an order for that and you can then stop by our lab to have the test done at a later time.    Telephone visits are billed at different rates depending on your insurance coverage. During this emergency period, for some insurers they may be billed the same as an in-person visit.  Please reach out to your insurance provider with any questions.    If during the course of the call the physician/provider feels a telephone visit is not appropriate, you will not be charged for this service.\"    Patient has given verbal consent for Telephone visit?  Yes    What phone number would you like to be contacted at? [phone]    How would you like to obtain your AVS? Mariel Cassidy     Tanya Denny is a 40 year old female who presents via phone visit today for the following health issues:    HPI    Patient was seen for upper and low back pain. Xrays were unremarkable, some improvement with prednisone, naproxen and methocarbamol. Will start therapy tomorrow. ESR was elevated but negative RA and SASHA.    Patient Active Problem List   Diagnosis     CARDIOVASCULAR SCREENING; LDL GOAL LESS THAN 160     Dyspepsia     Lumbago     Chronic mixed headache syndrome     Uterine leiomyoma, unspecified location     AMA (advanced maternal age) multigravida 35+     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     Somatic dysfunction of lumbar region     Past Surgical History:   Procedure Laterality Date     D & C  12/05     "   Social History     Tobacco Use     Smoking status: Never Smoker     Smokeless tobacco: Never Used   Substance Use Topics     Alcohol use: No     Family History   Problem Relation Age of Onset     Allergies Mother         angioedema in family members unspecified     Diabetes Mother      Hypertension Mother      Diabetes Father      Hypertension Father      Diabetes Maternal Grandmother      Hypertension Maternal Grandmother      Asthma Maternal Grandmother      Diabetes Maternal Grandfather      Hypertension Maternal Grandfather      Asthma Maternal Grandfather      Cancer No family hx of      Cerebrovascular Disease No family hx of      Thyroid Disease No family hx of      Glaucoma No family hx of      Macular Degeneration No family hx of      Breast Cancer No family hx of      Colon Cancer No family hx of      Depression No family hx of      Anxiety Disorder No family hx of      Genetic Disorder No family hx of          Current Outpatient Medications   Medication Sig Dispense Refill     Cholecalciferol (VITAMIN D PO) Take 1,000 Units by mouth daily       ferrous fumarate 65 mg, [place]. FE,-Vitamin C 125 mg (VITRON C)  MG TABS tablet Take 1 tablet by mouth daily       lidocaine (LIDODERM) 5 % patch Place 1 patch onto the skin every 24 hours To prevent lidocaine toxicity, patient should be patch free for 12 hrs daily. 12 patch 1     methocarbamol (ROBAXIN) 500 MG tablet Take 1 tablet (500 mg) by mouth 4 times daily as needed for muscle spasms 45 tablet 1     naproxen (NAPROSYN) 500 MG tablet Take 1 tablet (500 mg) by mouth 2 times daily (with meals) 30 tablet 0     Prenatal Vit-Iron Carbonyl-FA (PRENATABS RX) 29-1 MG TABS TAKE 1 TABLET BY MOUTH EVERY DAY 30 tablet 23     No Known Allergies  Recent Labs   Lab Test 06/07/19  1439 12/22/17  0753 08/28/17  2112 07/05/17  1508  05/06/16  1030 03/27/16  1212   A1C 4.9 5.2  --   --   --  5.0  --    LDL  --   --   --   --   --  82  --    HDL  --   --   --   --   --  61   --    TRIG  --   --   --   --   --  63  --    ALT  --  24  --  15  --   --  20   CR  --  0.73 0.64 0.72   < >  --  0.66   GFRESTIMATED  --  >90 >90 >90  Non African American GFR Calc     < >  --  >90  Non  GFR Calc     GFRESTBLACK  --  >90 >90 >90  African American GFR Calc     < >  --  >90   GFR Calc     POTASSIUM  --  4.1 3.2* 3.9   < >  --  4.3   TSH  --  1.49  --  0.98   < >  --   --     < > = values in this interval not displayed.      BP Readings from Last 3 Encounters:   07/21/20 106/72   06/07/19 109/68   05/17/19 100/60    Wt Readings from Last 3 Encounters:   07/21/20 90.6 kg (199 lb 11.2 oz)   06/07/19 91 kg (200 lb 9.6 oz)   05/17/19 88.5 kg (195 lb)                    Reviewed and updated as needed this visit by Provider  Tobacco  Allergies  Meds  Problems  Med Hx  Surg Hx  Fam Hx         Review of Systems   Constitutional, HEENT, cardiovascular, pulmonary, gi and gu systems are negative, except as otherwise noted.       Objective   Reported vitals:  There were no vitals taken for this visit.   healthy, alert and no distress  PSYCH: Alert and oriented times 3; coherent speech, normal   rate and volume, able to articulate logical thoughts, able   to abstract reason, no tangential thoughts, no hallucinations   or delusions  Her affect is normal  RESP: No cough, no audible wheezing, able to talk in full sentences  Remainder of exam unable to be completed due to telephone visits            Assessment/Plan:    1. Somatic dysfunction of lumbar region  Trial of physical therapy, follow up on labs below.  - lidocaine (LIDODERM) 5 % patch; Place 1 patch onto the skin every 24 hours To prevent lidocaine toxicity, patient should be patch free for 12 hrs daily.  Dispense: 12 patch; Refill: 1  - Cyclic Citrullinated Peptide Antibody IgG; Future  - SSA Ro CHERYLE Antibody IgG; Future  - SSB La CHERYLE Antibody IgG; Future  - Scleroderma Antibody Scl70 CHERYLE IgG; Future  - Lupus  Anticoagulant Panel; Future    2. Chronic bilateral low back pain with left-sided sciatica  - methocarbamol (ROBAXIN) 500 MG tablet; Take 1 tablet (500 mg) by mouth 4 times daily as needed for muscle spasms  Dispense: 45 tablet; Refill: 1  - lidocaine (LIDODERM) 5 % patch; Place 1 patch onto the skin every 24 hours To prevent lidocaine toxicity, patient should be patch free for 12 hrs daily.  Dispense: 12 patch; Refill: 1  - Cyclic Citrullinated Peptide Antibody IgG; Future  - SSA Ro CHERYLE Antibody IgG; Future  - SSB La CHERYLE Antibody IgG; Future  - Scleroderma Antibody Scl70 CHERYLE IgG; Future  - Lupus Anticoagulant Panel; Future    3. Chronic midline thoracic back pain  - methocarbamol (ROBAXIN) 500 MG tablet; Take 1 tablet (500 mg) by mouth 4 times daily as needed for muscle spasms  Dispense: 45 tablet; Refill: 1  - lidocaine (LIDODERM) 5 % patch; Place 1 patch onto the skin every 24 hours To prevent lidocaine toxicity, patient should be patch free for 12 hrs daily.  Dispense: 12 patch; Refill: 1    4. Elevated erythrocyte sedimentation rate  - Erythrocyte sedimentation rate auto; Future  - Cyclic Citrullinated Peptide Antibody IgG; Future  - SSA Ro CHERYLE Antibody IgG; Future  - SSB La CHERYLE Antibody IgG; Future  - Scleroderma Antibody Scl70 CHERYLE IgG; Future  - Lupus Anticoagulant Panel; Future    Return in about 1 month (around 9/11/2020) for In-Clinic Visit/back pain.      Phone call duration:  10 minutes    Mele Floyd MD

## 2020-08-12 ENCOUNTER — THERAPY VISIT (OUTPATIENT)
Dept: PHYSICAL THERAPY | Facility: CLINIC | Age: 40
End: 2020-08-12
Attending: FAMILY MEDICINE
Payer: COMMERCIAL

## 2020-08-12 ENCOUNTER — TELEPHONE (OUTPATIENT)
Dept: PEDIATRICS | Facility: CLINIC | Age: 40
End: 2020-08-12

## 2020-08-12 DIAGNOSIS — M99.03 SOMATIC DYSFUNCTION OF LUMBAR REGION: ICD-10-CM

## 2020-08-12 DIAGNOSIS — M54.6 CHRONIC MIDLINE THORACIC BACK PAIN: ICD-10-CM

## 2020-08-12 DIAGNOSIS — M54.42 CHRONIC BILATERAL LOW BACK PAIN WITH LEFT-SIDED SCIATICA: ICD-10-CM

## 2020-08-12 DIAGNOSIS — M99.03 SOMATIC DYSFUNCTION OF LUMBAR REGION: Primary | ICD-10-CM

## 2020-08-12 DIAGNOSIS — M54.42 CHRONIC BILATERAL LOW BACK PAIN WITH LEFT-SIDED SCIATICA: Primary | ICD-10-CM

## 2020-08-12 DIAGNOSIS — G89.29 CHRONIC BILATERAL LOW BACK PAIN WITH LEFT-SIDED SCIATICA: ICD-10-CM

## 2020-08-12 DIAGNOSIS — G89.29 CHRONIC MIDLINE THORACIC BACK PAIN: ICD-10-CM

## 2020-08-12 DIAGNOSIS — R70.0 ELEVATED ERYTHROCYTE SEDIMENTATION RATE: ICD-10-CM

## 2020-08-12 DIAGNOSIS — G89.29 CHRONIC BILATERAL LOW BACK PAIN WITH LEFT-SIDED SCIATICA: Primary | ICD-10-CM

## 2020-08-12 LAB — ERYTHROCYTE [SEDIMENTATION RATE] IN BLOOD BY WESTERGREN METHOD: 60 MM/H (ref 0–20)

## 2020-08-12 PROCEDURE — 36415 COLL VENOUS BLD VENIPUNCTURE: CPT | Performed by: FAMILY MEDICINE

## 2020-08-12 PROCEDURE — 85597 PHOSPHOLIPID PLTLT NEUTRALIZ: CPT | Performed by: FAMILY MEDICINE

## 2020-08-12 PROCEDURE — 97161 PT EVAL LOW COMPLEX 20 MIN: CPT | Mod: GP | Performed by: PHYSICAL THERAPIST

## 2020-08-12 PROCEDURE — 86235 NUCLEAR ANTIGEN ANTIBODY: CPT | Performed by: FAMILY MEDICINE

## 2020-08-12 PROCEDURE — 85732 THROMBOPLASTIN TIME PARTIAL: CPT | Performed by: FAMILY MEDICINE

## 2020-08-12 PROCEDURE — 85730 THROMBOPLASTIN TIME PARTIAL: CPT | Performed by: FAMILY MEDICINE

## 2020-08-12 PROCEDURE — 85652 RBC SED RATE AUTOMATED: CPT | Performed by: FAMILY MEDICINE

## 2020-08-12 PROCEDURE — 00000167 ZZHCL STATISTIC INR NC: Performed by: FAMILY MEDICINE

## 2020-08-12 PROCEDURE — 97110 THERAPEUTIC EXERCISES: CPT | Mod: GP | Performed by: PHYSICAL THERAPIST

## 2020-08-12 PROCEDURE — 97530 THERAPEUTIC ACTIVITIES: CPT | Mod: GP | Performed by: PHYSICAL THERAPIST

## 2020-08-12 PROCEDURE — 00000401 ZZHCL STATISTIC THROMBIN TIME NC: Performed by: FAMILY MEDICINE

## 2020-08-12 PROCEDURE — 85613 RUSSELL VIPER VENOM DILUTED: CPT | Performed by: FAMILY MEDICINE

## 2020-08-12 PROCEDURE — 86200 CCP ANTIBODY: CPT | Performed by: FAMILY MEDICINE

## 2020-08-12 RX ORDER — TRAMADOL HYDROCHLORIDE 50 MG/1
50 TABLET ORAL EVERY 6 HOURS PRN
Qty: 30 TABLET | Refills: 0 | Status: SHIPPED | OUTPATIENT
Start: 2020-08-12 | End: 2020-08-15

## 2020-08-12 NOTE — TELEPHONE ENCOUNTER
Alternatives like lidocaine cream and or diclofenac gel weren't covered by her insurance, so sent a short supply of tramadol.

## 2020-08-12 NOTE — TELEPHONE ENCOUNTER
Lidocaine 5% patch.  Alternative rx request from pharm. rx not covered under pt plan. Routing to provider for review. Jayda Andersen LPN

## 2020-08-13 ENCOUNTER — TELEPHONE (OUTPATIENT)
Dept: PEDIATRICS | Facility: CLINIC | Age: 40
End: 2020-08-13

## 2020-08-13 LAB
CCP AB SER IA-ACNC: 1 U/ML
ENA SCL70 IGG SER IA-ACNC: <0.2 AI (ref 0–0.9)
ENA SS-A IGG SER IA-ACNC: <0.2 AI (ref 0–0.9)
ENA SS-B IGG SER IA-ACNC: <0.2 AI (ref 0–0.9)

## 2020-08-13 NOTE — PROGRESS NOTES
[facility] Initial Evaluation -- Lumbar    Date: August 12, 2020  Tanya Denny is a 40 year old female with a lumbar condition.   Referral: Dr. 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  Return to weight-lifting.    HISTORY:    Present symptoms: ache across lower back into both hips.  Will feel numbness in the L LE when getting up from sitting basim cross apple sauce  Pain quality (sharp/shooting/stabbing/aching/burning/cramping):  Aching, sharp   Paresthesia (yes/no):  no    Present since (onset date): July 2019.     Symptoms (improving/unchanging/worsening):  worsening.     Symptoms commenced as a result of: symptoms began when she was 6 months pregnant with her second baby in 2019.  Notes that she had a baby in February 2019 and had her second baby in November 25, 2019.    Condition occurred in the following environment:   home     Symptoms at onset (back/thigh/leg): across the lower back into both hips and slightly up the midline of the spine  Constant symptoms (back/thigh/leg):  across the lower back into both hips and slightly up the midline of the spine  Intermittent symptoms (back/thigh/leg): numbness L LE    Symptoms are made worse with the following: Always Bending, Always Sitting, Always Rising, Always Standing, Always Walking, Sometimes Lying-supine and either side, Time of day - Always AM, lifting (not able to lift 15# son without significant pain, and Other - Hamstring stretching   Symptoms are made better with the following: Time of day - as the day progresses and PM and On the move/changing positions    Disturbed sleep (yes/no):  yes   Sleeping  postures (prone/sup/side R/L): prone    Previous episodes (0/1-5/6-10/11+): none   Year of first episode: 2019    Previous history: none  Previous treatments: Pt had 2 PT sessions prior to COVID 19 - did mainly strengthening - tended to be more painful after PT      Specific Questions:  Cough/Sneeze/Strain (pos/neg): negative  Bowel/Bladder (normal/abnormal): normal  Gait (normal/abnormal): normal  Medications (nil/NSAIDS/analg/steroids/anticoag/other):  NSAIDS, Muscle relaxants and Other - Sleep, Iron, Tramadol  Medical allergies:  none  General health (excellent/good/fair/poor):  good  Pertinent medical history:  Migraines/Headaches  Imaging (None/Xray/MRI/Other):  X-rays - normal  Recent or major surgery (yes/no):  no  Night pain (yes/no): no  Accidents (yes/no): no  Unexplained weight loss (yes/no): no  Barriers at home: no  Other red flags: no    EXAMINATION    Posture:   Sitting (good/fair/poor): poor  Standing (good/fair/poor):fair  Lordosis (red/acc/normal): decreased  Correction of posture (better/worse/no effect): better    Lateral Shift (right/left/nil): nil  Relevant (yes/no):  no  Other Observations: none    Neurological:    Motor deficit:  Strong and painfree    Reflexes:  Symmetrical LE and brisk  Sensory deficit:  Symmetrical to light touch    Dural signs:  Increased pain with seated SLR    Movement Loss:   Anastacio Mod Min Nil Pain   Flexion  x   Increased LBP   Extension x    Increased LBP   Side Gliding R  x x  Increased LBP   Side Gliding L x    Increased LBP     Test Movements:   During: produces, abolishes, increases, decreases, no effect, centralizing, peripheralizing   After: better, worse, no better, no worse, no effect, centralized, peripheralized    Pretest symptoms standin/10/10 pain across lower back into B hips   Symptoms During Symptoms After ROM increased ROM decreased No Effect   FIS          Rep FIS          EIS          Rep EIS          Pretest symptoms lyin/10 symptoms across  lower back into B hips    Symptoms During Symptoms After ROM increased ROM decreased No Effect   NELSON          Rep NELSON          EIL Increases    No Worse         Rep EIL Increases  PDM    Better    Increased EIS and B SGIS     If required, pretest symptoms: not tested   Symptoms During Symptoms After ROM increased ROM decreased No Effect   SGIS - R          Rep SGIS - R          SGIS - L          Rep SGIS - L            Static Tests:  Sitting slouched:  Not tested  Sitting erect:  better  Standing slouched not tested  Standing erect:  Not tested  Lying prone in extension:  Increased central lower back pain  Long sitting:  Not tested    Other Tests: prone lying decreased pain, B  Lumbar extension mobilization decreased pain and improved lumbar ROM and remained better afterwards  Provisional Classification:  Derangement - Asymmetrical, unilateral, symptoms below knee    Principle of Management:  Education:  Discussed proper sitting posture with use of the lumbar roll.  Discussed avoiding activities that increase lumbar flexion, avoid sitting basim cross applesauce.  Discussed centralization/peripheralization; monitor numbness L LE to make sure there is not a production of symptoms of L LE symptoms with exercises. .       Equipment provided:  No - demonstration how to roll towel for back support  Mechanical therapy (Y/N):  yes   Extension principle:  Prone lying 3' then follow with pressups  Lateral Principle:  no  Flexion principle:  no    Other:  no    ASSESSMENT/PLAN:    Patient is a 40 year old female with lumbar complaints.    Patient has the following significant findings with corresponding treatment plan.                Diagnosis 1:  Low back pain    Pain -  manual therapy, self management, education, directional preference exercise and home program  Decreased ROM/flexibility - manual therapy, therapeutic exercise and home program  Decreased joint mobility - manual therapy, therapeutic exercise and home  program  Impaired muscle performance - neuro re-education and home program  Decreased function - therapeutic activities and home program  Impaired posture - neuro re-education, therapeutic activities and home program    Therapy Evaluation Codes:   1) History comprised of:   Personal factors that impact the plan of care:      None.    Comorbidity factors that impact the plan of care are:      None.     Medications impacting care: Anti-inflammatory, Muscle relaxant and Pain.  2) Examination of Body Systems comprised of:   Body structures and functions that impact the plan of care:      Lumbar spine.   Activity limitations that impact the plan of care are:      Bending, Dressing, Lifting, Sitting, Stairs, Standing, Walking and Sleeping.  3) Clinical presentation characteristics are:   Evolving/Changing.  4) Decision-Making    Low complexity using standardized patient assessment instrument and/or measureable assessment of functional outcome.  Cumulative Therapy Evaluation is: Low complexity.    Previous and current functional limitations:  (See Goal Flow Sheet for this information)    Short term and Long term goals: (See Goal Flow Sheet for this information)     Communication ability:  Patient appears to be able to clearly communicate and understand verbal and written communication and follow directions correctly.  Treatment Explanation - The following has been discussed with the patient:   RX ordered/plan of care  Anticipated outcomes  Possible risks and side effects  This patient would benefit from PT intervention to resume normal activities.   Rehab potential is excellent.    Frequency:  1 X week, once daily  Duration:  for 10 weeks  Discharge Plan:  Achieve all LTG.  Independent in home treatment program.  Reach maximal therapeutic benefit.    Please refer to the daily flowsheet for treatment today, total treatment time and time spent performing 1:1 timed codes.

## 2020-08-13 NOTE — TELEPHONE ENCOUNTER
Patient saw labs over MyChart, she is looking for interpretation of results.    Please advise,    Ernestina Aldridge RN, [facility]

## 2020-08-13 NOTE — TELEPHONE ENCOUNTER
[facility] Call Center    Phone Message    May a detailed message be left on voicemail: yes     Reason for Call: Requesting Results   Name/type of test: labs  Date of test: 8/12  Was test done at a location other than [facility] (Please fill in the location if not [facility])?: No    [facility] but no interpretation was sent.     Action Taken: Message routed to:  Primary Care p [phone]    Travel Screening: Not Applicable         Sudha Zaldivar  Primary Care   [facility] [place]

## 2020-08-15 LAB — LA PPP-IMP: NEGATIVE

## 2020-08-17 ENCOUNTER — THERAPY VISIT (OUTPATIENT)
Dept: PHYSICAL THERAPY | Facility: CLINIC | Age: 40
End: 2020-08-17
Payer: COMMERCIAL

## 2020-08-17 DIAGNOSIS — G89.29 CHRONIC BILATERAL LOW BACK PAIN WITH LEFT-SIDED SCIATICA: ICD-10-CM

## 2020-08-17 DIAGNOSIS — M54.42 CHRONIC BILATERAL LOW BACK PAIN WITH LEFT-SIDED SCIATICA: ICD-10-CM

## 2020-08-17 PROCEDURE — 97140 MANUAL THERAPY 1/> REGIONS: CPT | Mod: GP | Performed by: PHYSICAL THERAPIST

## 2020-08-17 PROCEDURE — 97110 THERAPEUTIC EXERCISES: CPT | Mod: GP | Performed by: PHYSICAL THERAPIST

## 2020-09-01 ENCOUNTER — THERAPY VISIT (OUTPATIENT)
Dept: PHYSICAL THERAPY | Facility: CLINIC | Age: 40
End: 2020-09-01
Payer: COMMERCIAL

## 2020-09-01 DIAGNOSIS — M54.42 CHRONIC BILATERAL LOW BACK PAIN WITH LEFT-SIDED SCIATICA: ICD-10-CM

## 2020-09-01 DIAGNOSIS — G89.29 CHRONIC BILATERAL LOW BACK PAIN WITH LEFT-SIDED SCIATICA: ICD-10-CM

## 2020-09-01 PROCEDURE — 97110 THERAPEUTIC EXERCISES: CPT | Mod: GP | Performed by: PHYSICAL THERAPY ASSISTANT

## 2020-09-01 PROCEDURE — 97140 MANUAL THERAPY 1/> REGIONS: CPT | Mod: GP | Performed by: PHYSICAL THERAPY ASSISTANT

## 2020-09-14 ENCOUNTER — THERAPY VISIT (OUTPATIENT)
Dept: PHYSICAL THERAPY | Facility: CLINIC | Age: 40
End: 2020-09-14
Payer: COMMERCIAL

## 2020-09-14 DIAGNOSIS — G89.29 CHRONIC BILATERAL LOW BACK PAIN WITH LEFT-SIDED SCIATICA: ICD-10-CM

## 2020-09-14 DIAGNOSIS — M54.42 CHRONIC BILATERAL LOW BACK PAIN WITH LEFT-SIDED SCIATICA: ICD-10-CM

## 2020-09-14 PROCEDURE — 97110 THERAPEUTIC EXERCISES: CPT | Mod: GP | Performed by: PHYSICAL THERAPIST

## 2020-09-14 PROCEDURE — 97140 MANUAL THERAPY 1/> REGIONS: CPT | Mod: GP | Performed by: PHYSICAL THERAPIST

## 2020-09-15 NOTE — PROGRESS NOTES
PROGRESS  REPORT    Progress reporting period is from 8/12/2020 to 9/14/2020.       SUBJECTIVE  Patient notes that she is experiencing central lower back pain. Her symptoms are no longer into the L LE nor off to the L of the spine.  Her walking tolerance is improving; she was able to go school shopping with her children for over an hour without increased symptoms.    Patient notes that she is pregnant, about 9 weeks along.  She is still able to lay on her abdomen but has been trying lumbar extension in standing which does seem to be helpful.  Notes that she may have to decrease the frequency of PT as they are having some troubles with a vehicle.  Thus, not able to schedule future PT but will call if and when she can return. .      Current Pain level: 5/10.     Initial Pain level: 9/10.   Changes in function:  Yes (See Goal flowsheet attached for changes in current functional level)  Adverse reaction to treatment or activity: None    OBJECTIVE  Changes noted in objective findings:  Yes, improved LROM, improved sitting posture, improving function.  Objective:   LROM min loss EIS, min B SGIS.  FIS mod loss w pain.   Decreased pain and improved LROM after extension mobilization.   Fair sitting posture, can self correct to good; may slouch when unsupported.       ASSESSMENT/PLAN  Updated problem list and treatment plan: Diagnosis 1:  Low back pain    Pain -  manual therapy, self management, education, directional preference exercise and home program  Decreased ROM/flexibility - manual therapy, therapeutic exercise and home program  Decreased joint mobility - manual therapy, therapeutic exercise and home program  Decreased function - therapeutic activities and home program  Impaired posture - neuro re-education, therapeutic activities and home program  STG/LTGs have been met or progress has been made towards goals:  Yes (See Goal flow sheet completed today.)  Assessment of Progress: The patient's condition is  improving.  Patient is meeting short term goals and is progressing towards long term goals.  Self Management Plans:  Patient has been instructed in a home treatment program.  Patient  has been instructed in self management of symptoms.  I have re-evaluated this patient and find that the nature, scope, duration and intensity of the therapy is appropriate for the medical condition of the patient.  Tanya continues to require the following intervention to meet STG and LTG's:  PT    Recommendations:  This patient would benefit from continued therapy.     Frequency:  2 X a month, once daily  Duration:  for 3 months  Patient has had 4 of the 10 recommended visits.          Please refer to the daily flowsheet for treatment today, total treatment time and time spent performing 1:1 timed codes.

## 2020-11-06 PROBLEM — M54.42 BILATERAL LOW BACK PAIN WITH LEFT-SIDED SCIATICA: Status: RESOLVED | Noted: 2020-08-12 | Resolved: 2020-11-06

## 2020-11-06 NOTE — PROGRESS NOTES
Patient did not return for further treatment.  Please refer to the progress note and goal flowsheet completed on 09/14/20 for discharge information.

## 2020-11-16 ENCOUNTER — HEALTH MAINTENANCE LETTER (OUTPATIENT)
Age: 40
End: 2020-11-16

## 2020-11-18 DIAGNOSIS — Z3A.01 PREGNANCY WITH 7 COMPLETED WEEKS GESTATION: ICD-10-CM

## 2020-11-18 RX ORDER — PRENATAL VIT,CAL 76/IRON/FOLIC 29 MG-1 MG
TABLET ORAL
Qty: 30 TABLET | Refills: 23 | Status: SHIPPED | OUTPATIENT
Start: 2020-11-18 | End: 2021-12-10

## 2021-01-06 ENCOUNTER — OFFICE VISIT (OUTPATIENT)
Dept: PEDIATRICS | Facility: CLINIC | Age: 41
End: 2021-01-06
Payer: COMMERCIAL

## 2021-01-06 ENCOUNTER — TELEPHONE (OUTPATIENT)
Dept: PEDIATRICS | Facility: CLINIC | Age: 41
End: 2021-01-06

## 2021-01-06 VITALS
OXYGEN SATURATION: 100 % | BODY MASS INDEX: 36.76 KG/M2 | HEART RATE: 89 BPM | DIASTOLIC BLOOD PRESSURE: 75 MMHG | WEIGHT: 215.3 LBS | SYSTOLIC BLOOD PRESSURE: 121 MMHG | TEMPERATURE: 97.6 F

## 2021-01-06 DIAGNOSIS — M77.02 MEDIAL EPICONDYLITIS, LEFT: Primary | ICD-10-CM

## 2021-01-06 DIAGNOSIS — M62.830 SPASM OF BACK MUSCLES: ICD-10-CM

## 2021-01-06 PROCEDURE — 99213 OFFICE O/P EST LOW 20 MIN: CPT | Performed by: FAMILY MEDICINE

## 2021-01-06 NOTE — TELEPHONE ENCOUNTER
[facility] Call Center    Phone Message    May a detailed message be left on voicemail: yes     Reason for Call: Cleopatra from [facility] requesting a call back from the nurse in regards to DME order that was brought in by patient.  She is not quite sure what the patient needs so would like a call back for some clarification.  Thank you.    Action Taken: Message routed to:  Primary Care p [phone]    Travel Screening: Not Applicable

## 2021-01-06 NOTE — PATIENT INSTRUCTIONS
Patient Education     Understanding Medial Epicondylitis    Several muscles attach to the arm at the elbow joint. The tough bands of tissue that attach muscle to bones are called tendons. The bone in the upper arm has knobs on the farthest end called epicondyles. Tendons attach some arm muscles to these knobs. The tissues in this area can become irritated.   Epicondylitis is the medical term for a painful elbow over the epicondyle. Medial refers to the inner side of the elbow. Medial epicondylitis is sometimes called  golfer s elbow.     How to say it  [facility]lie[facility]tis   Causes of medial epicondylitis  A painful inner elbow may be caused by:    Using an elbow or hand the same way over and over    Using poor form or too much force in a sport such as golf, tennis, or baseball    Lifting too heavy a weight    Other injuries to the arm or elbow  Symptoms of medial epicondylitis    Pain or tenderness on the inside of the elbow that may travel down the forearm    Pain when moving the wrist    Pain or weakness when gripping something    A crackling sound or grating feeling when moving the elbow    Treatment for medial epicondylitis  Treatments may include:    Avoiding or changing the action that caused the problem. This helps prevent irritating the tissues more.    Prescription or over-the-counter medicines. These help reduce inflammation, swelling, and pain.    Braces. A counter-force brace can help reduce tendon strain. This allows the joint to heal.    Cold or heat packs. These help reduce pain and swelling.    Stretching and other exercises. These improve flexibility and strength.    Physical therapy. This may include exercises or other treatments.    Injections of medicine. This may relieve symptoms.  If other treatments don't relieve symptoms, you may need surgery.   Possible complications  If you don t give your elbow time to heal, symptoms may return or get worse. Follow your healthcare  provider s instructions on resting and treating your elbow.   When to call your healthcare provider  Call your healthcare provider right away if you have any of these:    Fever of 100.4 F (38 C) or higher, or as directed by your provider    Chills    Redness, swelling, or warmth that gets worse    Symptoms that don t get better with prescribed medicines, or get worse    New symptoms  [employer] last reviewed this educational content on 6/1/2019 2000-2020 [employer]. [address] [place] [address], [place] [address]. All rights reserved. This information is not intended as a substitute for professional medical care. Always follow your healthcare professional's instructions.

## 2021-01-06 NOTE — TELEPHONE ENCOUNTER
Patient went to [facility] with the DME order.  Staff there stated that patient was looking for something billable to insurance.  Staff showed patient elbow support, but patient stated she needed something with compression.      They are able to dispense compression arm sleeves, but provider would need to order as such with compression needed.  The weakest compression that they can get is 15-20 mmHg, next step up would be 20-30 mmHg.  They also have Tubigrip that they can sell by length, which would be 8-15 mmHg in a single layer.      Liyah Haynes RN

## 2021-01-06 NOTE — PROGRESS NOTES
Assessment & Plan     Medial epicondylitis, left  - PHYSICAL THERAPY REFERRAL; Future  - Miscellaneous Order for DME - ONLY FOR DME    Spasm of back muscles  - PHYSICAL THERAPY REFERRAL; Future  - Miscellaneous Order for DME - ONLY FOR DME    Patient is 6 months pregnant, not to exceed >3000 mg of tylenol. Alternate between ice and heat.    Return if symptoms worsen or fail to improve.    Mele Floyd MD  [facility]     Tanya Denny is a 41 year old who presents to clinic today for the following health issues     HPI       Musculoskeletal problem/pain  Onset/Duration: 3 months ago  Description  Location: Left elbow, moves to upper arm, shoulder and left upper back.  Joint Swelling: YES  Redness: no  Pain: YES  Warmth: no  Intensity:  moderate, 10/10  Progression of Symptoms:  same and constant  Accompanying signs and symptoms:   Fevers: no  Numbness/tingling/weakness: YES- numbness  History  Trauma to the area: no  Recent illness:  no  Previous similar problem: no  Previous evaluation:  no  Precipitating or alleviating factors:  Aggravating factors include: anytime doing anything  Therapies tried and outcome: heat, ice, massage and support wrap        Review of Systems   Constitutional, HEENT, cardiovascular, pulmonary, GI, , musculoskeletal, neuro, skin, endocrine and psych systems are negative, except as otherwise noted.      Objective    /75   Pulse 89   Temp 97.6  F (36.4  C) (Temporal)   Wt 97.7 kg (215 lb 4.8 oz)   SpO2 100%   BMI 36.76 kg/m    Body mass index is 36.76 kg/m .  Physical Exam  Musculoskeletal:      Left elbow: Tenderness found. Medial epicondyle tenderness noted.      Cervical back: She exhibits spasm.      Left upper arm: Normal.        GENERAL: healthy, alert and no distress

## 2021-01-07 NOTE — TELEPHONE ENCOUNTER
None of the above seem appropriate.She just needs an elbow sleeve or brace, which can cover her cubital area.

## 2021-01-07 NOTE — TELEPHONE ENCOUNTER
Contacted Cleopatra with [facility], informed of provider advice.  Cleopatra will contact the patient, inform her of provider recommendation and states that the patient is aware of what their options are.  She will advise the patient to contact provider if she needs clarification or feels that something different is needed.    Liyah Haynes RN

## 2021-01-12 ENCOUNTER — THERAPY VISIT (OUTPATIENT)
Dept: PHYSICAL THERAPY | Facility: CLINIC | Age: 41
End: 2021-01-12
Attending: FAMILY MEDICINE
Payer: COMMERCIAL

## 2021-01-12 DIAGNOSIS — M62.830 SPASM OF BACK MUSCLES: ICD-10-CM

## 2021-01-12 DIAGNOSIS — M54.2 NECK PAIN: ICD-10-CM

## 2021-01-12 DIAGNOSIS — M79.622 PAIN OF LEFT UPPER ARM: ICD-10-CM

## 2021-01-12 DIAGNOSIS — M77.02 MEDIAL EPICONDYLITIS, LEFT: ICD-10-CM

## 2021-01-12 PROCEDURE — 97110 THERAPEUTIC EXERCISES: CPT | Mod: GP | Performed by: PHYSICAL THERAPIST

## 2021-01-12 PROCEDURE — 97161 PT EVAL LOW COMPLEX 20 MIN: CPT | Mod: GP | Performed by: PHYSICAL THERAPIST

## 2021-01-12 NOTE — PROGRESS NOTES
[facility] Initial Evaluation  Subjective:  The history is provided by the patient. No  was used.   Patient Health History  Tanya Denny being seen for Neck with left arm pain.     Date of Onset: 2019 intermittant, 2020 constant.   Problem occurred: unknown   Pain is reported as 10/10 on pain scale.  General health as reported by patient is good.  Pertinent medical history includes: currently pregnant.     Medical allergies: none.   Surgeries include:  None.     Other medications details: Prenatal vitamins, vit D, iron, .    Current occupation is [employer].                     Therapist Generated HPI Evaluation  Problem details: Is currently pregnancy with baby #8 and is 28 weeks pregnant.  Pain started in 2019 with 7th pregnancy but was intermittent.  It has progressively gotten work throughout 2020.  Does notice the left arm and fingers will tingle with increased use..         Type of problem:  Cervical spine.    This is a chronic condition.  Condition occurred with:  Insidious onset.  Where condition occurred: for unknown reasons.  Patient reports pain:  Cervical left side.  Pain is described as burning, aching and other (tingling)   Pain radiates to:  Shoulder left and upper arm left. Pain is the same all the time.  Since onset symptoms are gradually worsening.  Associated symptoms:  Headache. Symptoms are exacerbated by lifting and other (using the left arm, sleeping on the left side)  and relieved by nothing.      Barriers include:  None as reported by patient.                        Objective:  System                        ROM:  AROM:      Flexion Elbow:  Left:158   Right:158  Extension Elbow:  Left: 5    Right: 5                    Strength:    Flexion Elbow:  Left: 4/5 Pain:    Right: 5/5 Pain:  Extension Elbow: Left: 4/5 Pain:    Right: 5/5 Pain:    Extension Wrist: Left: 4/5 Pain:  Right: 5/5 Pain:                Palpation:  Palpation elbow/wrist: Edema:  Medial Epicondyle (L) 27 cm (R) 26 cm.  Left wrist/elbow tenderness present at: Medial Epicondyle                                  Ed Cervical Evaluation      Movement Loss:    Flexion (Flex): nil  Retraction (RET): min and mod  Extension (EXT): min and mod  Lateral Flexion Right (LF R): nil  Lateral Flexion Left (LF L): nil  Rotation Right (ROT R): mod  Rotation Left (ROT L): mod                                                 ROS    Assessment/Plan:    Patient is a 41 year old female with cervical and left side elbow complaints.    Patient has the following significant findings with corresponding treatment plan.                Diagnosis 1:  Neck with left arm pain  Pain -  hot/cold therapy, US, electric stimulation, manual therapy, self management and education  Decreased ROM/flexibility - manual therapy and therapeutic exercise  Decreased strength - therapeutic exercise and therapeutic activities  Impaired muscle performance - neuro re-education  Decreased function - therapeutic activities        Previous and current functional limitations:  (See Goal Flow Sheet for this information)    Short term and Long term goals: (See Goal Flow Sheet for this information)     Communication ability:  Patient appears to be able to clearly communicate and understand verbal and written communication and follow directions correctly.  Treatment Explanation - The following has been discussed with the patient:   RX ordered/plan of care  Anticipated outcomes  Possible risks and side effects  This patient would benefit from PT intervention to resume normal activities.   Rehab potential is good.    Frequency:  1 X week, once daily  Duration:  for 8 weeks  Discharge Plan:  Achieve all LTG.  Independent in home treatment program.  Reach maximal therapeutic benefit.    Please refer to the daily flowsheet for treatment today, total treatment time and time spent performing 1:1 timed codes.

## 2021-01-14 ENCOUNTER — THERAPY VISIT (OUTPATIENT)
Dept: OCCUPATIONAL THERAPY | Facility: CLINIC | Age: 41
End: 2021-01-14
Payer: COMMERCIAL

## 2021-01-14 DIAGNOSIS — M25.522 LEFT ELBOW PAIN: ICD-10-CM

## 2021-01-14 DIAGNOSIS — M77.02 MEDIAL EPICONDYLITIS OF ELBOW, LEFT: ICD-10-CM

## 2021-01-14 PROCEDURE — 97165 OT EVAL LOW COMPLEX 30 MIN: CPT | Mod: GO | Performed by: OCCUPATIONAL THERAPIST

## 2021-01-14 PROCEDURE — 97035 APP MDLTY 1+ULTRASOUND EA 15: CPT | Mod: GO | Performed by: OCCUPATIONAL THERAPIST

## 2021-01-14 PROCEDURE — 97110 THERAPEUTIC EXERCISES: CPT | Mod: GO | Performed by: OCCUPATIONAL THERAPIST

## 2021-01-14 NOTE — PROGRESS NOTES
Hand Therapy Initial Evaluation    Current Date:  1/14/2021  Referring Physician:Mele Floyd MD    Diagnosis: Medial epicondylitis, left  DOI: 1/12/21 (Date of order)    Subjective:  Tanya Denny is a 41 year old right hand dominant female.  General health as reported by patient is good.  Pertinent medical history includes: currently pregnant.   Medical allergies: none.   Surgeries include:  None.    Other medications details: Prenatal vitamins, vit D, iron, .      Therapist Generated HPI Evaluation  Problem details: Is currently pregnancy with baby #8 and is 28 weeks pregnant.  Pain started in 2019 with 7th pregnancy but was intermittent.  It has progressively gotten work throughout 2020.  Does notice the left arm and fingers will tingle with increased use..        Type of problem: elbow pain  Patient reports symptoms of pain, stiffness/loss of motion, edema, numbness and tingling  of the left elobw which occurred due to an unknown etiology. Since onset symptoms are Gradually getting worse.  Special tests:  none.  Previous treatment: none.        Occupational Profile Information:  Current occupation is [employer],   Prior functional level:  no limitations  Barriers include:none  Mobility: No difficulty  Transportation: drives  Leisure activities/hobbies: exercises, work with  children    Upper Extremity Functional Index Score:  SCORE:   Column Totals: /80: 51   (A lower score indicates greater disability.)    Objective:  Pain Level (Scale 0-10):   1/14/2021   At Rest 10/10   With Use 10/10     Pain Description:  Date 1/14/2021   Location Medial elbow   Pain Quality Aching, Stabbing and locking    Frequency constant     Pain is worst  daytime or nighttime   Exacerbated by  can't tell what aggravates the elbow   Relieved by none   Progression Gradually worsening     Posture  Forward Neck Posture and Rounded Forward Shoulders    Sensation  Decreased Radial Nerve distribution per pt  report    ROM  Pain Report: - none  + mild    ++ moderate    +++ severe   Elbow 1/14/2021 1/14/2021   AROM (PROM) Right Left   Extension 0 0   Flexion 140 140   Supination 80 80   Pronation 85 85     Wrist 1/14/2021 1/14/2021   AROM (PROM) Right Left   Extension 65 60   Flexion 80 80   RD     UD       Resisted Testing  Pain Report:  - none    + mild    ++ moderate    +++ severe   Left 1/14/2021   Elbow Extension -   Elbow Flexion +++   Supination  -   Pronation +++   Wrist Ext with RD, Elbow at side -   Wrist Ext with UD, Elbow at side -   Wrist Ext with RD, Elbow Ext -   Wrist Ext with UD, Elbow Ext -   Wrist Flex with RD, Elbow at side -   Wrist Flex with UD, Elbow at side -   Wrist Flex with RD, Elbow Ext -   Wrist Flex with UD, Elbow Ext -   FDS -     Special Tests   - none    + mild    ++ moderate    +++ severe      Left 1/14/2021   Elbow Flexion Test -   Tinels Cubital Tunnel +   Tinels Guyons Canal -   ULTT Ulnar Nerve      Strength   (Measured in pounds)  Pain Report:  - none    + mild    ++ moderate    +++ severe    1/14/2021 1/14/2021   Trials Right Left   1  2  3 27  34  30 34  14++  28   Average 30 25     Lat Pinch 1/14/2021 1/14/2021   Trials Right Left   1  2  3 11 11   Average       3 Pt Pinch 1/14/2021 1/14/2021   Trials Right Left   1  2  3 11 5   Average       Palpation  Pain Report:  - none    + mild    ++ moderate    +++ severe   Left 1/14/2021   Bicep Muscle -   Distal Bicep Tendon -   [place] of [place] +   Cubital Tunnel  +   MEP +   Flexor Origin ++   Flexor Wad ++   Pronator Teres +   Guyon's Canal -     Assessment:  Patient presents with symptoms consistent with diagnosis of elbow pain  with conservative intervention.     Patient's limitations or Problem List includes:  Pain, Weakness, Decreased , Decreased pinch and Tightness in musculature of the left elbow which interferes with the patient's ability to perform Self Care Tasks (dressing, bathing), Work Tasks, Sleep Patterns,  Recreational Activities and Household Chores as compared to previous level of function.    Rehab Potential:  Excellent - Return to full activity, no limitations    Patient will benefit from skilled Occupational Therapy to increase ROM, flexibility and overall strength and decrease pain to return to previous activity level and resume normal daily tasks and to reach their rehab potential.    Barriers to Learning:  No barrier    Communication Issues:  Patient appears to be able to clearly communicate and understand verbal and written communication and follow directions correctly.     Chart Review: Brief history including review of medical and/or therapy records relating to the presenting problem and Simple history review with patient    Identified Performance Deficits: bathing/showering, dressing, care of others, home establishment and management, meal preparation and cleanup, sleep, work and leisure activities    Assessment of Occupational Performance:  5 or more Performance Deficits    Clinical Decision Making (Complexity): Low complexity    Treatment Explanation:  The following has been discussed with the patient:  RX ordered/plan of care  Anticipated outcomes  Possible risks and side effects    Plan:  Frequency:  1 X week, once daily  Duration:  for 8 weeks  Treatment Plan:    Modalities:  US  Therapeutic Exercise:  AROM, PROM, Tendon Gliding, Isotonics and Isometrics  Neuromuscular re-education:  Nerve Gliding and Kinesiotaping  Manual Techniques:  Friction massage and Myofascial release  Orthotic Fabrication:  Forearm based orthosis  Self Care:  Self Care Tasks, Ergonomic Considerations and Diagnostic Education    Discharge Plan:  Achieve all LTG.  Independent in home treatment program.  Reach maximal therapeutic benefit.    Home Exercise Program:  Warmth for stiffness to flexors  Ice after activity for pain to MEP  Flexor and extensor wad stretches  MFR to flexors  Ulnar nerve glides    Next Visit:  US  TFM to  MEP  Isometric wrist flexor and pronator strengthening  Progress to eccentric wrist flexor strengthening as tolerated

## 2021-03-10 PROBLEM — M77.02 MEDIAL EPICONDYLITIS OF ELBOW, LEFT: Status: RESOLVED | Noted: 2021-01-14 | Resolved: 2021-03-10

## 2021-03-10 PROBLEM — M25.522 LEFT ELBOW PAIN: Status: RESOLVED | Noted: 2021-01-14 | Resolved: 2021-03-10

## 2021-03-10 NOTE — PROGRESS NOTES
Discharge Summary - Hand Therapy    Patient did not return to therapy after the initial evaluationt.  We will assume that patient's goals were met.    D/C from [facility].

## 2021-04-13 PROBLEM — M79.622 PAIN OF LEFT UPPER ARM: Status: RESOLVED | Noted: 2021-01-12 | Resolved: 2021-04-13

## 2021-04-13 PROBLEM — M54.2 NECK PAIN: Status: RESOLVED | Noted: 2021-01-12 | Resolved: 2021-04-13

## 2021-05-04 ENCOUNTER — MEDICAL CORRESPONDENCE (OUTPATIENT)
Dept: HEALTH INFORMATION MANAGEMENT | Facility: CLINIC | Age: 41
End: 2021-05-04

## 2021-05-26 ENCOUNTER — RECORDS - HEALTHEAST (OUTPATIENT)
Dept: ADMINISTRATIVE | Facility: CLINIC | Age: 41
End: 2021-05-26

## 2021-05-28 ENCOUNTER — RECORDS - HEALTHEAST (OUTPATIENT)
Dept: ADMINISTRATIVE | Facility: CLINIC | Age: 41
End: 2021-05-28

## 2021-06-09 ENCOUNTER — MEDICAL CORRESPONDENCE (OUTPATIENT)
Dept: HEALTH INFORMATION MANAGEMENT | Facility: CLINIC | Age: 41
End: 2021-06-09

## 2021-06-10 NOTE — TELEPHONE ENCOUNTER
Tanya is 7 weeks pregnant and bleeding vaginally.     [Negative] : Allergic/Immunologic [FreeTextEntry2] : Never had colonoscopy

## 2021-06-16 NOTE — PROGRESS NOTES
"    Assessment & Plan     Bilateral foot pain  ?tendonitis  Advised orthotics.  - XR Foot Bilateral G/E 3 Views; Future  - Orthopedic  Referral; Future    20  minutes spent on the date of the encounter doing chart review, history and exam, documentation and further activities per the note       BMI:   Estimated body mass index is 35.6 kg/m  as calculated from the following:    Height as of this encounter: 1.632 m (5' 4.25\").    Weight as of this encounter: 94.8 kg (209 lb).   Weight management plan: Discussed healthy diet and exercise guidelines        No follow-ups on file.    Mele Floyd MD  [facility] GREGORY Martínez is a 41 year old who presents for the following health issues  accompanied by her self:    History of Present Illness       She eats 4 or more servings of fruits and vegetables daily.She consumes 0 sweetened beverage(s) daily.She exercises with enough effort to increase her heart rate 9 or less minutes per day.  She exercises with enough effort to increase her heart rate 4 days per week.   She is taking medications regularly.       Musculoskeletal problem/pain/EDEMA  Onset/Duration: 2 1/2 months  Description  Location: foot - bilateral  Joint Swelling: YES  Redness: no  Pain: YES  Warmth: no  Intensity:  6/10  Progression of Symptoms:  same  Accompanying signs and symptoms:   Fevers: chills  Numbness/tingling/weakness: YES  History  Trauma to the area: no  Recent illness:  no  Previous similar problem: YES  Previous evaluation:  YES  Precipitating or alleviating factors:  Aggravating factors include: standing, sitting, laying flat always has swelling  Therapies tried and outcome: support wrap        Review of Systems   Constitutional, HEENT, cardiovascular, pulmonary, GI, , musculoskeletal, neuro, skin, endocrine and psych systems are negative, except as otherwise noted.      Objective    /68 (BP Location: Left arm, Patient Position: Chair, Cuff " "Size: Adult Large)   Pulse 77   Temp 98  F (36.7  C) (Temporal)   Resp 18   Ht 1.632 m (5' 4.25\")   Wt 94.8 kg (209 lb)   LMP  (Exact Date)   SpO2 98%   Breastfeeding Yes   BMI 35.60 kg/m    Body mass index is 35.6 kg/m .  Physical Exam  Musculoskeletal:         General: No swelling, tenderness, deformity or signs of injury.      Right lower leg: No edema.      Left lower leg: No edema.          General: NAD      Results for orders placed or performed in visit on 06/21/21   XR Foot Bilateral G/E 3 Views     Status: None    Narrative    XR FOOT BILATERAL G/E 3 VIEWS 6/21/2021 6:20 PM     HISTORY: Bilateral foot pain; Bilateral foot pain      Impression    IMPRESSION: Bilateral pes planus. Otherwise unremarkable radiographs.    JACY JACOBSEN MD             "

## 2021-06-21 ENCOUNTER — ANCILLARY PROCEDURE (OUTPATIENT)
Dept: GENERAL RADIOLOGY | Facility: CLINIC | Age: 41
End: 2021-06-21
Attending: FAMILY MEDICINE
Payer: COMMERCIAL

## 2021-06-21 ENCOUNTER — OFFICE VISIT (OUTPATIENT)
Dept: FAMILY MEDICINE | Facility: CLINIC | Age: 41
End: 2021-06-21
Payer: COMMERCIAL

## 2021-06-21 VITALS
WEIGHT: 209 LBS | DIASTOLIC BLOOD PRESSURE: 68 MMHG | TEMPERATURE: 98 F | SYSTOLIC BLOOD PRESSURE: 110 MMHG | HEART RATE: 77 BPM | BODY MASS INDEX: 35.68 KG/M2 | OXYGEN SATURATION: 98 % | HEIGHT: 64 IN | RESPIRATION RATE: 18 BRPM

## 2021-06-21 DIAGNOSIS — M79.672 BILATERAL FOOT PAIN: ICD-10-CM

## 2021-06-21 DIAGNOSIS — M79.671 BILATERAL FOOT PAIN: Primary | ICD-10-CM

## 2021-06-21 DIAGNOSIS — M79.672 BILATERAL FOOT PAIN: Primary | ICD-10-CM

## 2021-06-21 DIAGNOSIS — M79.671 BILATERAL FOOT PAIN: ICD-10-CM

## 2021-06-21 PROCEDURE — 73630 X-RAY EXAM OF FOOT: CPT | Mod: 59 | Performed by: RADIOLOGY

## 2021-06-21 PROCEDURE — 99213 OFFICE O/P EST LOW 20 MIN: CPT | Performed by: FAMILY MEDICINE

## 2021-06-21 RX ORDER — NICOTINE POLACRILEX 4 MG/1
20 GUM, CHEWING ORAL DAILY
COMMUNITY
Start: 2021-06-02 | End: 2022-08-31

## 2021-06-21 RX ORDER — IBUPROFEN 600 MG/1
600 TABLET, FILM COATED ORAL 3 TIMES DAILY PRN
Qty: 60 TABLET | Refills: 0 | Status: SHIPPED | OUTPATIENT
Start: 2021-06-21 | End: 2021-07-09

## 2021-06-21 RX ORDER — FERROUS SULFATE 325(65) MG
TABLET ORAL
COMMUNITY
Start: 2021-06-01

## 2021-06-21 ASSESSMENT — PAIN SCALES - GENERAL: PAINLEVEL: SEVERE PAIN (6)

## 2021-06-21 ASSESSMENT — MIFFLIN-ST. JEOR: SCORE: 1606.99

## 2021-07-08 NOTE — PROGRESS NOTES
"Assessment & Plan     Chronic bilateral low back pain with sciatica, sciatica laterality unspecified  Patient with acute on chronic low back pain. Elevated ESR but unremarkable autoimmune work up thus far. Currently nursing,  6. Xray lumbar and thoracic  2020 unremarkable.  - ibuprofen (ADVIL/MOTRIN) 800 MG tablet; Take 1 tablet (800 mg) by mouth every 8 hours as needed for moderate pain  - oxyCODONE (ROXICODONE) 5 MG tablet; Take 1 tablet (5 mg) by mouth every 6 hours as needed for pain  - PHYSICAL THERAPY REFERRAL; Future  - Spine Referral; Future  - ketorolac (TORADOL) injection 30 mg      30  minutes spent on the date of the encounter doing chart review, history and exam, documentation and further activities per the note      No follow-ups on file.    Mele Floyd MD  [facility]    Ange Denny is a 41 year old female who presents to clinic today for the following health issues accompanied by her Self:    HPI     Answers for HPI/ROS submitted by the patient on 2021   Your back pain is: recurring  Where is your back pain located? : right lower back, left lower back, right hip, left hip  How would you describe your back pain? : other  Where does your back pain spread? : right foot  Since you noticed your back pain, how has it changed? : no longer a problem  Does your back pain interfere with your job?: No        Review of Systems   Constitutional, HEENT, cardiovascular, pulmonary, GI, , musculoskeletal, neuro, skin, endocrine and psych systems are negative, except as otherwise noted.      Objective    /68   Pulse 72   Temp 98.8  F (37.1  C) (Temporal)   Resp 18   Ht 1.632 m (5' 4.25\")   Wt 95.9 kg (211 lb 8 oz)   LMP 2021 (Exact Date)   SpO2 100%   Breastfeeding No   BMI 36.02 kg/m    Body mass index is 36.02 kg/m .  Physical Exam  Musculoskeletal:      Lumbar back: She exhibits tenderness, bony tenderness, pain and spasm. " She exhibits normal range of motion.        GENERAL: healthy, alert and no distress  NECK: no adenopathy, no asymmetry, masses, or scars and thyroid normal to palpation  RESP: lungs clear to auscultation - no rales, rhonchi or wheezes  CV: regular rate and rhythm, normal S1 S2, no S3 or S4, no murmur, click or rub, no peripheral edema and peripheral pulses strong  ABDOMEN: soft, nontender, no hepatosplenomegaly, no masses and bowel sounds normal

## 2021-07-09 ENCOUNTER — OFFICE VISIT (OUTPATIENT)
Dept: FAMILY MEDICINE | Facility: CLINIC | Age: 41
End: 2021-07-09
Payer: COMMERCIAL

## 2021-07-09 VITALS
TEMPERATURE: 98.8 F | WEIGHT: 211.5 LBS | RESPIRATION RATE: 18 BRPM | HEART RATE: 72 BPM | BODY MASS INDEX: 36.11 KG/M2 | SYSTOLIC BLOOD PRESSURE: 128 MMHG | OXYGEN SATURATION: 100 % | HEIGHT: 64 IN | DIASTOLIC BLOOD PRESSURE: 68 MMHG

## 2021-07-09 DIAGNOSIS — G89.29 CHRONIC BILATERAL LOW BACK PAIN WITH SCIATICA, SCIATICA LATERALITY UNSPECIFIED: Primary | ICD-10-CM

## 2021-07-09 DIAGNOSIS — M54.40 CHRONIC BILATERAL LOW BACK PAIN WITH SCIATICA, SCIATICA LATERALITY UNSPECIFIED: Primary | ICD-10-CM

## 2021-07-09 PROCEDURE — 96372 THER/PROPH/DIAG INJ SC/IM: CPT | Performed by: FAMILY MEDICINE

## 2021-07-09 PROCEDURE — 99214 OFFICE O/P EST MOD 30 MIN: CPT | Mod: 25 | Performed by: FAMILY MEDICINE

## 2021-07-09 RX ORDER — OXYCODONE HYDROCHLORIDE 5 MG/1
5 TABLET ORAL EVERY 6 HOURS PRN
Qty: 12 TABLET | Refills: 0 | Status: SHIPPED | OUTPATIENT
Start: 2021-07-09 | End: 2021-07-12

## 2021-07-09 RX ORDER — IBUPROFEN 800 MG/1
800 TABLET, FILM COATED ORAL EVERY 8 HOURS PRN
Qty: 60 TABLET | Refills: 1 | Status: SHIPPED | OUTPATIENT
Start: 2021-07-09 | End: 2022-07-22

## 2021-07-09 RX ORDER — KETOROLAC TROMETHAMINE 30 MG/ML
30 INJECTION, SOLUTION INTRAMUSCULAR; INTRAVENOUS ONCE
Status: COMPLETED | OUTPATIENT
Start: 2021-07-09 | End: 2021-07-09

## 2021-07-09 RX ADMIN — KETOROLAC TROMETHAMINE 30 MG: 30 INJECTION, SOLUTION INTRAMUSCULAR; INTRAVENOUS at 10:04

## 2021-07-09 ASSESSMENT — MIFFLIN-ST. JEOR: SCORE: 1618.33

## 2021-07-09 NOTE — NURSING NOTE
Clinic Administered Medication Documentation    Administrations This Visit     ketorolac (TORADOL) injection 30 mg     Admin Date  07/09/2021 Action  Given Dose  30 mg Route  Intramuscular Site  Right Deltoid Administered By  Tina Melchor CMA    Ordering Provider: Mele Floyd MD    Patient Supplied?: No                  Injectable Medication Documentation    Patient was given Ketorolac Tromethamine (Toradol). Prior to medication administration, verified patients identity using patient s name and date of birth. Please see MAR and medication order for additional information. Patient instructed to stay in clinic after the injection but patient declined.      Was entire vial of medication used? Yes  Vial/Syringe: Single dose vial  Expiration Date:  3/2023  Was this medication supplied by the patient? No     Tina Melchor CMA ([facility])

## 2021-07-15 ENCOUNTER — OFFICE VISIT (OUTPATIENT)
Dept: PODIATRY | Facility: CLINIC | Age: 41
End: 2021-07-15
Attending: FAMILY MEDICINE
Payer: COMMERCIAL

## 2021-07-15 VITALS — SYSTOLIC BLOOD PRESSURE: 130 MMHG | HEART RATE: 73 BPM | OXYGEN SATURATION: 100 % | DIASTOLIC BLOOD PRESSURE: 88 MMHG

## 2021-07-15 DIAGNOSIS — M79.671 BILATERAL FOOT PAIN: ICD-10-CM

## 2021-07-15 DIAGNOSIS — M79.672 BILATERAL FOOT PAIN: ICD-10-CM

## 2021-07-15 DIAGNOSIS — R60.0 PERIPHERAL EDEMA: Primary | ICD-10-CM

## 2021-07-15 DIAGNOSIS — M72.2 PLANTAR FASCIITIS, BILATERAL: ICD-10-CM

## 2021-07-15 PROCEDURE — 99204 OFFICE O/P NEW MOD 45 MIN: CPT | Performed by: PODIATRIST

## 2021-07-15 NOTE — PATIENT INSTRUCTIONS
Thanks for coming today.  Ortho[facility] Medicine Clinic  [address] [place] [address]    To schedule future appointments in Ortho Clinic, you may call [phone].    To schedule ordered imaging by your provider:   Call Central Imaging [phone]    To schedule an injection ordered by your provider:  Call Central [facility] Injection scheduling line: [phone]  [facility] available online at:  [email or web address]    Please call if any further questions or concerns ([phone]).  Clinic hours 8 am to 5 pm.    Return to clinic (call) if symptoms worsen or fail to improve.

## 2021-07-15 NOTE — PROGRESS NOTES
Past Medical History:   Diagnosis Date     Migraine      SAB (spontaneous )     1ST TRIMESTER     Patient Active Problem List   Diagnosis     CARDIOVASCULAR SCREENING; LDL GOAL LESS THAN 160     Dyspepsia     Lumbago     Chronic mixed headache syndrome     Uterine leiomyoma, unspecified location     AMA (advanced maternal age) multigravida 35+     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     Somatic dysfunction of lumbar region     Past Surgical History:   Procedure Laterality Date     D & C       Social History     Socioeconomic History     Marital status:      Spouse name: Not on file     Number of children: Not on file     Years of education: Not on file     Highest education level: Not on file   Occupational History     Not on file   Tobacco Use     Smoking status: Never Smoker     Smokeless tobacco: Never Used   Substance and Sexual Activity     Alcohol use: No     Drug use: No     Sexual activity: Yes     Partners: Male     Birth control/protection: None   Other Topics Concern     Parent/sibling w/ CABG, MI or angioplasty before 65F 55M? No      Service No     Blood Transfusions No     Caffeine Concern No     Occupational Exposure No     Hobby Hazards No     Sleep Concern No     Stress Concern No     Weight Concern No     Special Diet Yes     Back Care No     Exercise Yes     Bike Helmet No     Seat Belt Yes     Self-Exams Yes   Social History Narrative    Mother of 4, all starting school this yearWorks in  can bring her kids there too.  is a teacher , out of work now.         How much exercise per week? 3 days     How much calcium per day? 1 serving      How much caffeine per day? 3 cups    How much vitamin D per day?  prenatals    Do you/your family wear seatbelts?  Yes    Do you/your family use safety helmets? na    Do you/your family use sunscreen?No    Do you/your family keep firearms in the home? No    Do you/your family have a smoke detector(s)? Yes        Do you feel safe in your  home? Yes    Has anyone ever touched you in an unwanted manner?      Explain         June 11, 2014 Shaneka Abbasi LPN        Reviewed mikal espinoza 12-         Social Determinants of Health     Financial Resource Strain:      Difficulty of Paying Living Expenses:    Food Insecurity:      Worried About Running Out of Food in the Last Year:      Ran Out of Food in the Last Year:    Transportation Needs:      Lack of Transportation (Medical):      Lack of Transportation (Non-Medical):    Physical Activity:      Days of Exercise per Week:      Minutes of Exercise per Session:    Stress:      Feeling of Stress :    Social Connections:      Frequency of Communication with Friends and Family:      Frequency of Social Gatherings with Friends and Family:      Attends Temple Services:      Active Member of Clubs or Organizations:      Attends Club or Organization Meetings:      Marital Status:    Intimate Partner Violence:      Fear of Current or Ex-Partner:      Emotionally Abused:      Physically Abused:      Sexually Abused:      Family History   Problem Relation Age of Onset     Allergies Mother         angioedema in family members unspecified     Diabetes Mother      Hypertension Mother      Diabetes Father      Hypertension Father      Diabetes Maternal Grandmother      Hypertension Maternal Grandmother      Asthma Maternal Grandmother      Diabetes Maternal Grandfather      Hypertension Maternal Grandfather      Asthma Maternal Grandfather      Cancer No family hx of      Cerebrovascular Disease No family hx of      Thyroid Disease No family hx of      Glaucoma No family hx of      Macular Degeneration No family hx of      Breast Cancer No family hx of      Colon Cancer No family hx of      Depression No family hx of      Anxiety Disorder No family hx of      Genetic Disorder No family hx of      SUBJECTIVE FINDINGS:  A 41-year-old female presents for foot pain bilaterally.  She relates it started about 3 months  ago after the birth of her child.  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  Then she pulls it under her leg and plantarflexes the other one, and that hurts across the top of the foot.  She relates her heels hurt.  She relates she gets swelling in her legs.  She feels that is from some antibiotics she received.  She relates that she has seen [facility], and they did some compression.  They told her to get some tight socks, and those were too tight, and she did not tolerate those.  She did see her primary physician.  I did review Dr. Floyd's 06/21/2021 office notes and also x-rays from 06/21/2021.  She relates no specific injuries.    OBJECTIVE FINDINGS:  DP and PT are 2/4 bilaterally.  She has dorsally contracted digits 2-5 bilaterally.  She has a flexible flat foot, left greater than right.  She has no erythema, no drainage, no odor, no calor bilaterally.  She has minimal to mild peripheral edema bilaterally.  She has pain on palpation of the dorsal tarsometatarsal joint bilaterally.  She has pain on palpation of the plantar heel bilaterally.  There are no gross tendon voids bilaterally.    X-rays reviewed.  The joint and cortical margins are intact.  She has dorsally contracted digits, decreased calcaneal inclination, anterior break in the Syme align, left greater than right noted.    ASSESSMENT AND PLAN:  Plantar fasciitis bilaterally. Mid foot pain bilaterally.  Peripheral edema bilaterally.  Diagnosis and treatment options discussed with the patient.  Prescription for compression socks 15-20 mmHg given and use discussed with her.  I advised her on icing.  The patient is casted for custom foot orthotics.  She is given the phone number and address [facility] to [facility] up.  Prescription for Physical Therapy given and use discussed with her.  Return to clinic and see me in 2 months.

## 2021-07-15 NOTE — LETTER
7/15/2021         RE: Tanya Denny  [address]  [place] [address]        Dear Colleague,    Thank you for referring your patient, Tanya Denny, to the [facility]. Please see a copy of my visit note below.    Past Medical History:   Diagnosis Date     Migraine      SAB (spontaneous )     1ST TRIMESTER     Patient Active Problem List   Diagnosis     CARDIOVASCULAR SCREENING; LDL GOAL LESS THAN 160     Dyspepsia     Lumbago     Chronic mixed headache syndrome     Uterine leiomyoma, unspecified location     AMA (advanced maternal age) multigravida 35+     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     Somatic dysfunction of lumbar region     Past Surgical History:   Procedure Laterality Date     D & C       Social History     Socioeconomic History     Marital status:      Spouse name: Not on file     Number of children: Not on file     Years of education: Not on file     Highest education level: Not on file   Occupational History     Not on file   Tobacco Use     Smoking status: Never Smoker     Smokeless tobacco: Never Used   Substance and Sexual Activity     Alcohol use: No     Drug use: No     Sexual activity: Yes     Partners: Male     Birth control/protection: None   Other Topics Concern     Parent/sibling w/ CABG, MI or angioplasty before 65F 55M? No      Service No     Blood Transfusions No     Caffeine Concern No     Occupational Exposure No     Hobby Hazards No     Sleep Concern No     Stress Concern No     Weight Concern No     Special Diet Yes     Back Care No     Exercise Yes     Bike Helmet No     Seat Belt Yes     Self-Exams Yes   Social History Narrative    Mother of 4, all starting school this yearWorks in  can bring her kids there too.  is a teacher , out of work now.         How much exercise per week? 3 days     How much calcium per day? 1 serving      How much caffeine per day? 3 cups    How much vitamin D per day?   prenatals    Do you/your family wear seatbelts?  Yes    Do you/your family use safety helmets? na    Do you/your family use sunscreen?No    Do you/your family keep firearms in the home? No    Do you/your family have a smoke detector(s)? Yes        Do you feel safe in your home? Yes    Has anyone ever touched you in an unwanted manner?      Explain         June 11, 2014 Shaneka Abbasi LPN        Reviewed mkial espinoza 12-         Social Determinants of Health     Financial Resource Strain:      Difficulty of Paying Living Expenses:    Food Insecurity:      Worried About Running Out of Food in the Last Year:      Ran Out of Food in the Last Year:    Transportation Needs:      Lack of Transportation (Medical):      Lack of Transportation (Non-Medical):    Physical Activity:      Days of Exercise per Week:      Minutes of Exercise per Session:    Stress:      Feeling of Stress :    Social Connections:      Frequency of Communication with Friends and Family:      Frequency of Social Gatherings with Friends and Family:      Attends [employer] Services:      Active Member of Clubs or Organizations:      Attends Club or Organization Meetings:      Marital Status:    Intimate Partner Violence:      Fear of Current or Ex-Partner:      Emotionally Abused:      Physically Abused:      Sexually Abused:      Family History   Problem Relation Age of Onset     Allergies Mother         angioedema in family members unspecified     Diabetes Mother      Hypertension Mother      Diabetes Father      Hypertension Father      Diabetes Maternal Grandmother      Hypertension Maternal Grandmother      Asthma Maternal Grandmother      Diabetes Maternal Grandfather      Hypertension Maternal Grandfather      Asthma Maternal Grandfather      Cancer No family hx of      Cerebrovascular Disease No family hx of      Thyroid Disease No family hx of      Glaucoma No family hx of      Macular Degeneration No family hx of      Breast Cancer No family  hx of      Colon Cancer No family hx of      Depression No family hx of      Anxiety Disorder No family hx of      Genetic Disorder No family hx of      SUBJECTIVE FINDINGS:  A 41-year-old female presents for foot pain bilaterally.  She relates it started about 3 months ago after the birth of her child.  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  Then she pulls it under her leg and plantarflexes the other one, and that hurts across the top of the foot.  She relates her heels hurt.  She relates she gets swelling in her legs.  She feels that is from some antibiotics she received.  She relates that she has seen [facility], and they did some compression.  They told her to get some tight socks, and those were too tight, and she did not tolerate those.  She did see her primary physician.  I did review Dr. Floyd's 06/21/2021 office notes and also x-rays from 06/21/2021.  She relates no specific injuries.    OBJECTIVE FINDINGS:  DP and PT are 2/4 bilaterally.  She has dorsally contracted digits 2-5 bilaterally.  She has a flexible flat foot, left greater than right.  She has no erythema, no drainage, no odor, no calor bilaterally.  She has minimal to mild peripheral edema bilaterally.  She has pain on palpation of the dorsal tarsometatarsal joint bilaterally.  She has pain on palpation of the plantar heel bilaterally.  There are no gross tendon voids bilaterally.    X-rays reviewed.  The joint and cortical margins are intact.  She has dorsally contracted digits, decreased calcaneal inclination, anterior break in the Syme align, left greater than right noted.    ASSESSMENT AND PLAN:  Plantar fasciitis bilaterally. Mid foot pain bilaterally.  Peripheral edema bilaterally.  Diagnosis and treatment options discussed with the patient.  Prescription for compression socks 15-20 mmHg given and use discussed with her.  I advised her on icing.  The patient is casted for custom foot  orthotics.  She is given the phone number and address to [facility] to pick those up.  Prescription for Physical Therapy given and use discussed with her.  Return to clinic and see me in 2 months.          Again, thank you for allowing me to participate in the care of your patient.        Sincerely,        Des Mckeon DPM

## 2021-07-15 NOTE — NURSING NOTE
Tanya Denny's chief complaint for this visit includes:  Chief Complaint   Patient presents with     New Patient     bilateral foot pain for 3 months     PCP: Amira Early    Referring Provider:  Mele Floyd MD  [id number] [facility]  [place] [id number]    /88 (BP Location: Right arm, Patient Position: Sitting, Cuff Size: Adult Regular)   Pulse 73   LMP 07/05/2021 (Exact Date)   SpO2 100%   Data Unavailable     Do you need any medication refills at today's visit? No    Genoveva Epperson CMA

## 2021-07-16 ENCOUNTER — TRANSFERRED RECORDS (OUTPATIENT)
Dept: HEALTH INFORMATION MANAGEMENT | Facility: CLINIC | Age: 41
End: 2021-07-16

## 2021-07-23 ENCOUNTER — TRANSFERRED RECORDS (OUTPATIENT)
Dept: HEALTH INFORMATION MANAGEMENT | Facility: CLINIC | Age: 41
End: 2021-07-23

## 2021-08-06 ENCOUNTER — THERAPY VISIT (OUTPATIENT)
Dept: PHYSICAL THERAPY | Facility: CLINIC | Age: 41
End: 2021-08-06
Payer: COMMERCIAL

## 2021-08-06 DIAGNOSIS — M79.672 LEFT FOOT PAIN: Primary | ICD-10-CM

## 2021-08-06 PROCEDURE — 97110 THERAPEUTIC EXERCISES: CPT | Mod: GP | Performed by: PHYSICAL THERAPIST

## 2021-08-06 PROCEDURE — 97161 PT EVAL LOW COMPLEX 20 MIN: CPT | Mod: GP | Performed by: PHYSICAL THERAPIST

## 2021-08-06 NOTE — PROGRESS NOTES
[facility] Initial Evaluation -- Lower Extremity    Evaluation Date: August 6, 2021  Tanya Denny is a 41 year old female with a L foot condition.   Referral: Dr. Mckeon  Work mechanical stresses: prolonged standing   Employment status: [employer], 3 hour shifts  Leisure mechanical stresses: being with her family  Functional disability score: LEFS 80/80  VAS score (0-10): 7-10/10  Patient goals/expectations:  Alleviate foot pain with sitting with her foot in plantar flexion (main goal).  Be on her feet for 2-3 hours without pain     HISTORY:    Present symptoms:pain on the lateral dorsum of the L foot  when sitting in a chair with her feet in a plantar flexed position; feet resting against the chair legs, toes on the floor.. The pain will linger for a couple of hours.    Pain quality (sharp/shooting/stabbing/aching/burning/cramping):  Sharp, ache    Present since (onset date): July 2021    Symptoms (improving/unchanging/worsening):  unchanging.      Symptoms commenced as a result of: unknown   Condition occurred in the following environment: home     Symptoms at onset: lateral dorsum L foot  Paresthesia (yes/no):  no  Spinal history: yes-currently in physical therapy for her back at a different PT group/clinic       Constant symptoms: none  Intermittent symptoms: lateral dorsum L foot    Symptoms are worse with the following: sitting with her feet in full plantar flexion, soles of feet against the chair legs, toes on the floor and with standing, walking > 2 hours.    Symptoms are better with the following: avoiding the painful activity    Continued use makes the pain (better/worse/no effect): no effect    Disturbed night (yes/no): no      Pain at rest (yes/no):  Only if sitting with feet in the plantar flexed position, then symptoms will remain for a couple hours  Site (back/hip/knee/ankle/foot):  Ankle/foot as having PT for her back (she does have lower back pain with referral into  the L LE)    Other questions (swelling/clicking/locking/giving way/falling):  none     Previous episodes: none  Previous treatments: none    Specific Questions:  General health (excellent/good/fair/poor):  Good/excellent  Pertinent medical history includes: None  Medications (nil/NSAIDS/analg/steroids/anticoag/other):  Avoiding medication d/t breastfeeding.  Only taking Vitamins  Medical allergies:  Pork gelatin/protein  Imaging (none/Xray/MRI/other):  X-rays-Bilateral pes planus. Otherwise unremarkable radiographs.  Recent or major surgery (yes/no):  6th child born early April 2021  Night pain (yes/no):  no  Accidents (yes/no):  no  Unexplained weight loss (yes/no):  no  Barriers at home: no  Other red flags: no    Sites for physical examination (back/hip/knee/ankle/foot/other): ankle/foot    EXAMINATION    Posture:  Sitting (good/fair/poor): poor    Correction of Posture (better/worse/no effect/NA): better for her back, NE on foot as feet flat on floor  Standing (good/fair/poor): fair  Other observations:  Low arches bilaterally    Neurological: (NA/motor/sensory/reflexes/dural): not assessed    Baselines (pain or functional activity): pain when sitting with her feet in a plantar flexed position, soles of feet against the front chair legs, toes on the floor    Extremities (Hip / Knee / Ankle / Foot): ankle    Movement Loss Anastacio Mod Min Nil Pain   Flexion        Extension        Abduction        Adduction        Internal Rotation        External Rotation        Dorsiflexion     L 2 deg, R 10 deg   Plantarflexion     L 70 deg, R 60 deg   Inversion     Full, symmetrical   Eversion     Full, symmetrical     Passive Movement (+/- over pressure)/(PDM/ERP):  DF 4 deg on the L - NE  Resisted Test Response (pain): weakness on the L with DF 3/5, great toe extension 4+/5, otherwise strong, strain lateral foot with PF, PF/DF, PF/DF  Other Tests: tenderness at the posterior lateral joint line and anterior lateral joint  line    Spine:  Not assessed as patient having PT elsewhere for lower back  Movement loss:   Effect of repeated movements:   Effect of static positioning:   Spine testing (not relevant/relevant/secondary problem): secondary (strength loss does corollate with lower back referral)    Baseline Symptoms: sharp pain lateral dorsum of foot, proximal when feet are plantar flexed, soles against chair legs with her toes on the floor  Repeated Tests Symptom Response Mechanical Response   Active/Passive movement, resisted test, functional test During -  Produce, Abolish, Increase, Decrease, NE After -  Better, Worse, NB, NW, NE Effect -   ? or ? ROM, strength or key functional test No   Effect   Passive DF with belt No Effect  Feels good to do    Better    Able to sit in painful position w/o the pain being produced    Standing passive DF (looks like Soleus stretch) Produce  Strain distal lateral shin    No Worse     Still able to sit in aggravating position without Prod symptoms                     Effect of static positioning                  Provisional Classification (Extremity/Spine):  Extremity - Derangement      Principle of Management:   Education:  Discussed trying to find a box that she could put her foot on to assist to lift the L LE to 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  Continue to avoid putting her ankle in offensive position to allow her ankle/foot to heal.      Equipment provided:  none  Exercise and dosage:  passive DF 10 reps 6 times a day.  Try the exercise before nursing to determine if able to nurse for a while prior to symptoms commencing if not able to find something to assist in raising her leg similar to the height of her foot when in plantar flexion    ASSESSMENT/PLAN:    Patient is a 41 year old female with left side ankle/foot complaints.    Patient has the following significant findings with corresponding treatment plan.                Diagnosis  1:  L dorsum of foot pain    Pain -  manual therapy, self management, education, directional preference exercise and home program  Decreased ROM/flexibility - manual therapy, therapeutic exercise and home program  Decreased joint mobility - manual therapy, therapeutic exercise and home program  Decreased function - therapeutic activities and home program    Therapy Evaluation Codes:   1) History comprised of:   Personal factors that impact the plan of care:      None.    Comorbidity factors that impact the plan of care are:      None.     Medications impacting care: None.  2) Examination of Body Systems comprised of:   Body structures and functions that impact the plan of care:      Ankle.   Activity limitations that impact the plan of care are:      Standing and prolonged plantar flexion when sitting.  3) Clinical presentation characteristics are:   Stable/Uncomplicated.  4) Decision-Making    Low complexity using standardized patient assessment instrument and/or measureable assessment of functional outcome.  Cumulative Therapy Evaluation is: Low complexity.    Previous and current functional limitations:  (See Goal Flow Sheet for this information)    Short term and Long term goals: (See Goal Flow Sheet for this information)     Communication ability:  Patient appears to be able to clearly communicate and understand verbal and written communication and follow directions correctly.  Treatment Explanation - The following has been discussed with the patient:   RX ordered/plan of care  Anticipated outcomes  Possible risks and side effects  This patient would benefit from PT intervention to resume normal activities.   Rehab potential is excellent.    Frequency:  1 X week, once daily  Duration:  for 4-8 visits  Discharge Plan:  Achieve all LTG.  Independent in home treatment program.  Reach maximal therapeutic benefit.    Please refer to the daily flowsheet for treatment today, total treatment time and time spent  performing 1:1 timed codes.     Answers for HPI/ROS submitted by the patient on 8/6/2021  Reason for Visit:: My feet  When problem began:: 8/6/2021  How problem occurred:: I don't know  Number scale: 7/10  General health as reported by patient: good, excellent  Please check all that apply to your current or past medical history: none  Medical allergies: none  Surgeries: none  Medications you are currently taking: none  What are your primary job tasks: prolonged sitting

## 2021-08-18 ENCOUNTER — THERAPY VISIT (OUTPATIENT)
Dept: PHYSICAL THERAPY | Facility: CLINIC | Age: 41
End: 2021-08-18
Payer: COMMERCIAL

## 2021-08-18 DIAGNOSIS — M79.672 LEFT FOOT PAIN: Primary | ICD-10-CM

## 2021-08-18 PROCEDURE — 97110 THERAPEUTIC EXERCISES: CPT | Mod: GP | Performed by: PHYSICAL THERAPY ASSISTANT

## 2021-08-18 PROCEDURE — 97140 MANUAL THERAPY 1/> REGIONS: CPT | Mod: GP | Performed by: PHYSICAL THERAPY ASSISTANT

## 2021-08-20 ENCOUNTER — THERAPY VISIT (OUTPATIENT)
Dept: PHYSICAL THERAPY | Facility: CLINIC | Age: 41
End: 2021-08-20
Payer: COMMERCIAL

## 2021-08-20 DIAGNOSIS — M79.672 LEFT FOOT PAIN: Primary | ICD-10-CM

## 2021-08-20 PROCEDURE — 97110 THERAPEUTIC EXERCISES: CPT | Mod: GP | Performed by: PHYSICAL THERAPIST

## 2021-08-20 PROCEDURE — 97140 MANUAL THERAPY 1/> REGIONS: CPT | Mod: GP | Performed by: PHYSICAL THERAPIST

## 2021-09-14 NOTE — PROGRESS NOTES
SUBJECTIVE:   CC: Tanya Denny is an 41 year old woman who presents for preventive health visit.     Patient has been advised of split billing requirements and indicates understanding: Yes  HPI  Healthy Habits:     Getting at least 3 servings of Calcium per day:  Yes    Bi-annual eye exam: No    Dental care twice a year:  Yes    Sleep apnea or symptoms of sleep apnea:  None    Diet:  Regular     Frequency of exercise:  1-3 days/week    Duration of exercise:  30 minutes    Taking medications regularly:  Yes    Medication side effects:  Not applicable    PHQ-2 Total Score: 0    Additional concerns today:  No    Back pain and bilateral foot pain has resolved.    Today's PHQ-2 Score:   PHQ-2 ( 1999 [employer]) 9/17/2021   Q1: Little interest or pleasure in doing things 0   Q2: Feeling down, depressed or hopeless 0   PHQ-2 Score 0   Q1: Little interest or pleasure in doing things Not at all   Q2: Feeling down, depressed or hopeless Not at all   PHQ-2 Score 0       Abuse: Current or Past (Physical, Sexual or Emotional) - No  Do you feel safe in your environment? Yes    Have you ever done Advance Care Planning? (For example, a Health Directive, POLST, or a discussion with a medical provider or your loved ones about your wishes): No, advance care planning information given to patient to review.  Patient declined advance care planning discussion at this time.    Social History     Tobacco Use     Smoking status: Never Smoker     Smokeless tobacco: Never Used   Substance Use Topics     Alcohol use: No         Alcohol Use 9/17/2021   Prescreen: >3 drinks/day or >7 drinks/week? No   No flowsheet data found.    Reviewed orders with patient.  Reviewed health maintenance and updated orders accordingly - Yes  BP Readings from Last 3 Encounters:   09/17/21 102/80   09/16/21 124/84   07/15/21 130/88    Wt Readings from Last 3 Encounters:   09/17/21 97.3 kg (214 lb 6.4 oz)   07/09/21 95.9 kg (211 lb 8 oz)   06/21/21 94.8 kg  (209 lb)                  Patient Active Problem List   Diagnosis     CARDIOVASCULAR SCREENING; LDL GOAL LESS THAN 160     Dyspepsia     Lumbago     Chronic mixed headache syndrome     Uterine leiomyoma, unspecified location     AMA (advanced maternal age) multigravida 35+     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     Somatic dysfunction of lumbar region     Left foot pain     Past Surgical History:   Procedure Laterality Date     D & C  12/05       Social History     Tobacco Use     Smoking status: Never Smoker     Smokeless tobacco: Never Used   Substance Use Topics     Alcohol use: No     Family History   Problem Relation Age of Onset     Allergies Mother         angioedema in family members unspecified     Diabetes Mother      Hypertension Mother      Diabetes Father      Hypertension Father      Diabetes Maternal Grandmother      Hypertension Maternal Grandmother      Asthma Maternal Grandmother      Diabetes Maternal Grandfather      Hypertension Maternal Grandfather      Asthma Maternal Grandfather      Cancer No family hx of      Cerebrovascular Disease No family hx of      Thyroid Disease No family hx of      Glaucoma No family hx of      Macular Degeneration No family hx of      Breast Cancer No family hx of      Colon Cancer No family hx of      Depression No family hx of      Anxiety Disorder No family hx of      Genetic Disorder No family hx of          Current Outpatient Medications   Medication Sig Dispense Refill     carbamide peroxide (DEBROX) 6.5 % otic solution Place 5 drops Into the left ear 2 times daily 15 mL 0     Cholecalciferol (VITAMIN D PO) Take 1,000 Units by mouth daily       ferrous fumarate 65 mg, [place]. FE,-Vitamin C 125 mg (VITRON C)  MG TABS tablet Take 1 tablet by mouth daily       ferrous sulfate (FEROSUL) 325 (65 Fe) MG tablet TAKE 1 TABLET BY MOUTH EVERY DAY WITH BREAKFAST       fluconazole (DIFLUCAN) 150 MG tablet Take 1 tablet (150 mg) by mouth once a week for 4 doses 4 tablet 0      ibuprofen (ADVIL/MOTRIN) 800 MG tablet Take 1 tablet (800 mg) by mouth every 8 hours as needed for moderate pain 60 tablet 1     omeprazole 20 MG tablet Take 20 mg by mouth daily       Prenatal Vit-Iron Carbonyl-FA (PRENATABS RX) 29-1 MG TABS TAKE 1 TABLET BY MOUTH EVERY DAY 30 tablet 23     Allergies   Allergen Reactions     Pork Gelatin [Pork (Porcine) Protein] GI Disturbance     Recent Labs   Lab Test 09/17/21  1044 06/07/19  1439 12/22/17  0753 08/28/17  2112 07/05/17  1508 07/05/17  1508 06/14/16  1333 05/06/16  1030 03/27/16  1212 08/19/14  1422   A1C  --  4.9 5.2  --   --   --   --  5.0  --    < >   *  --   --   --   --   --   --  82  --   --    HDL 61  --   --   --   --   --   --  61  --   --    TRIG 105  --   --   --   --   --   --  63  --   --    ALT  --   --  24  --   --  15  --   --  20  --    CR  --   --  0.73 0.64   < > 0.72   < >  --  0.66  --    GFRESTIMATED  --   --  >90 >90   < > >90  Non  GFR Calc     < >  --  >90  Non  GFR Calc    --    GFRESTBLACK  --   --  >90 >90   < > >90  African American GFR Calc     < >  --  >90   GFR Calc    --    POTASSIUM  --   --  4.1 3.2*   < > 3.9   < >  --  4.3  --    TSH 0.79  --  1.49  --    < > 0.98   < >  --   --   --     < > = values in this interval not displayed.        Breast Cancer Screening:  Any new diagnosis of family breast, ovarian, or bowel cancer? No    FHS-7: No flowsheet data found.    Mammogram Screening - Offered annual screening and updated Health Maintenance for mutual plan based on risk factor consideration    Pertinent mammograms are reviewed under the imaging tab.    History of abnormal Pap smear: NO - age 30- 65 PAP every 3 years recommended  NO - age 30-65 PAP every 5 years with negative HPV co-testing recommended  PAP / HPV Latest Ref Rng & Units 5/6/2016 8/29/2012 11/18/2010   PAP (Historical) - NIL NIL NIL   HPV16 NEG Negative - -   HPV18 NEG Negative - -   HRHPV NEG Negative - -      Reviewed and updated as needed this visit by clinical staff  Tobacco  Allergies  Meds   Med Hx  Surg Hx  Fam Hx  Soc Hx        Reviewed and updated as needed this visit by Provider                Past Medical History:   Diagnosis Date     Migraine      SAB (spontaneous )     1ST TRIMESTER      Past Surgical History:   Procedure Laterality Date     D & C       OB History    Para Term  AB Living   9 6 6 0 2 6   SAB TAB Ectopic Multiple Live Births   2 0 0 0 6      # Outcome Date GA Lbr Alexandru/2nd Weight Sex Delivery Anes PTL Lv   9             8 Term 19 39w0d  3.969 kg (8 lb 12 oz) M  None N ADAM      Apgar1: 8  Apgar5: 9   7 Term 10/17/13 40w6d 05:31 / 00:05 3.912 kg (8 lb 10 oz) F Vag-Spont None N ADAM      Birth Comments: none      Name: ECHO PAYNE      Apgar1: 9  Apgar5: 9   6 Term 03/11/10 39w5d   F    ADAM      Birth Comments: [place]   5 Term 06 39w5d   M    ADAM      Birth Comments: [place]   4 Term 05 41w0d   M    ADAM      Birth Comments: [place]   3 SAB 10/01/04 4w0d             Birth Comments: [facility]&C   2 Term 01 39w4d   F    ADAM      Birth Comments: [place]   1 SAB                Review of Systems  CONSTITUTIONAL: NEGATIVE for fever, chills, change in weight  INTEGUMENTARU/SKIN: NEGATIVE for worrisome rashes, moles or lesions  EYES: NEGATIVE for vision changes or irritation  ENT: NEGATIVE for ear, mouth and throat problems  RESP: NEGATIVE for significant cough or SOB  BREAST: NEGATIVE for masses, tenderness or discharge  CV: NEGATIVE for chest pain, palpitations or peripheral edema  GI: NEGATIVE for nausea, abdominal pain, heartburn, or change in bowel habits  : NEGATIVE for unusual urinary or vaginal symptoms. Periods are regular.  MUSCULOSKELETAL: NEGATIVE for significant arthralgias or myalgia  NEURO: NEGATIVE for weakness, dizziness or paresthesias  ENDOCRINE: NEGATIVE for temperature intolerance, skin/hair  "changes  PSYCHIATRIC: NEGATIVE for changes in mood or affect     OBJECTIVE:   /80   Pulse 74   Temp 98  F (36.7  C) (Temporal)   Resp 16   Ht 1.632 m (5' 4.25\")   Wt 97.3 kg (214 lb 6.4 oz)   LMP 09/08/2021 (Exact Date)   SpO2 99%   BMI 36.52 kg/m    Physical Exam  GENERAL: healthy, alert and no distress  EYES: Eyes grossly normal to inspection, PERRL and conjunctivae and sclerae normal  HENT: ear canals and cerumen impaction bilateral TM, nose and mouth without ulcers or lesions  NECK: no adenopathy, no asymmetry, masses, or scars and thyroid normal to palpation  RESP: lungs clear to auscultation - no rales, rhonchi or wheezes  BREAST: normal without masses, tenderness or nipple discharge and no palpable axillary masses or adenopathy  CV: regular rate and rhythm, normal S1 S2, no S3 or S4, no murmur, click or rub, no peripheral edema and peripheral pulses strong  ABDOMEN: soft, nontender, no hepatosplenomegaly, no masses and bowel sounds normal  MS: no gross musculoskeletal defects noted, no edema  SKIN: no suspicious lesions or rashes  NEURO: Normal strength and tone, mentation intact and speech normal  PSYCH: mentation appears normal, affect normal/bright        ASSESSMENT/PLAN:   (Z00.00) Routine general medical examination at a health care facility  (primary encounter diagnosis)  Comment: See Order  Plan: REVIEW OF HEALTH MAINTENANCE PROTOCOL ORDERS            (E55.9) Vitamin D deficiency  Comment: See order  Plan: Vitamin D Deficiency      (Z13.29) Screening for endocrine disorder  Comment: See order  Plan: TSH with free T4 reflex         (Z13.220) Lipid screening  Comment: See order  Plan: Lipid panel reflex to direct LDL Fasting          (N76.0) Vaginitis and vulvovaginitis  Comment: Recent diagnosis, improved but returned again. Labs and urgent care results reviewed.  Plan: fluconazole (DIFLUCAN) 150 MG tablet      (G43.809) Other migraine without status migrainosus, not intractable  Comment: " "Stable, currently asymptmatic  Plan: MIGRAINE ACTION PLAN         (M99.03) Somatic dysfunction of lumbar region  Comment:Improved, continue physical therapy as scheduled  Plan: Urine Drugs of Abuse Screen Panel 13      (Z01.00) Examination of eyes and vision  Comment: See order  Plan: OPTOMETRY REFERRAL      (H61.23) Bilateral impacted cerumen  Comment: See order  Plan: carbamide peroxide (DEBROX) 6.5 % otic solution           Patient has been advised of split billing requirements and indicates understanding: Yes  COUNSELING:  Reviewed preventive health counseling, as reflected in patient instructions       Regular exercise       Healthy diet/nutrition       Vision screening       Hearing screening       Safe sex practices/STD prevention       Colon cancer screening       Consider Hep C screening for all patients one time for ages 18-79 years       Advance Care Planning    Estimated body mass index is 36.52 kg/m  as calculated from the following:    Height as of this encounter: 1.632 m (5' 4.25\").    Weight as of this encounter: 97.3 kg (214 lb 6.4 oz).    Weight management plan: Discussed healthy diet and exercise guidelines    She reports that she has never smoked. She has never used smokeless tobacco.      Counseling Resources:  ATP IV Guidelines  Pooled Cohorts Equation Calculator  Breast Cancer Risk Calculator  BRCA-Related Cancer Risk Assessment: FHS-7 Tool  FRAX Risk Assessment  ICSI Preventive Guidelines  Dietary Guidelines for Americans, 2010  USDA's MyPlate  ASA Prophylaxis  Lung CA Screening    Mele Floyd MD  [facility]  "

## 2021-09-16 ENCOUNTER — OFFICE VISIT (OUTPATIENT)
Dept: PODIATRY | Facility: CLINIC | Age: 41
End: 2021-09-16
Payer: COMMERCIAL

## 2021-09-16 VITALS — DIASTOLIC BLOOD PRESSURE: 84 MMHG | SYSTOLIC BLOOD PRESSURE: 124 MMHG | OXYGEN SATURATION: 97 % | HEART RATE: 68 BPM

## 2021-09-16 DIAGNOSIS — M79.672 BILATERAL FOOT PAIN: Primary | ICD-10-CM

## 2021-09-16 DIAGNOSIS — R60.0 PERIPHERAL EDEMA: ICD-10-CM

## 2021-09-16 DIAGNOSIS — M79.671 BILATERAL FOOT PAIN: Primary | ICD-10-CM

## 2021-09-16 DIAGNOSIS — M72.2 PLANTAR FASCIITIS, BILATERAL: ICD-10-CM

## 2021-09-16 PROCEDURE — 99213 OFFICE O/P EST LOW 20 MIN: CPT | Performed by: PODIATRIST

## 2021-09-16 NOTE — PROGRESS NOTES
Past Medical History:   Diagnosis Date     Migraine      SAB (spontaneous )     1ST TRIMESTER     Patient Active Problem List   Diagnosis     CARDIOVASCULAR SCREENING; LDL GOAL LESS THAN 160     Dyspepsia     Lumbago     Chronic mixed headache syndrome     Uterine leiomyoma, unspecified location     AMA (advanced maternal age) multigravida 35+     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     Somatic dysfunction of lumbar region     Left foot pain     Past Surgical History:   Procedure Laterality Date     D & C       Social History     Socioeconomic History     Marital status:      Spouse name: Not on file     Number of children: Not on file     Years of education: Not on file     Highest education level: Not on file   Occupational History     Not on file   Tobacco Use     Smoking status: Never Smoker     Smokeless tobacco: Never Used   Substance and Sexual Activity     Alcohol use: No     Drug use: No     Sexual activity: Yes     Partners: Male     Birth control/protection: None   Other Topics Concern     Parent/sibling w/ CABG, MI or angioplasty before 65F 55M? No      Service No     Blood Transfusions No     Caffeine Concern No     Occupational Exposure No     Hobby Hazards No     Sleep Concern No     Stress Concern No     Weight Concern No     Special Diet Yes     Back Care No     Exercise Yes     Bike Helmet No     Seat Belt Yes     Self-Exams Yes   Social History Narrative    Mother of 4, all starting school this yearWorks in  can bring her kids there too.  is a teacher , out of work now.         How much exercise per week? 3 days     How much calcium per day? 1 serving      How much caffeine per day? 3 cups    How much vitamin D per day?  prenatals    Do you/your family wear seatbelts?  Yes    Do you/your family use safety helmets? na    Do you/your family use sunscreen?No    Do you/your family keep firearms in the home? No    Do you/your family have a smoke detector(s)? Yes        Do you  feel safe in your home? Yes    Has anyone ever touched you in an unwanted manner?      Explain         June 11, 2014 Shaneka Abbasi LPN        Reviewed mikal espinoza 12-         Social Determinants of Health     Financial Resource Strain:      Difficulty of Paying Living Expenses:    Food Insecurity:      Worried About Running Out of Food in the Last Year:      Ran Out of Food in the Last Year:    Transportation Needs:      Lack of Transportation (Medical):      Lack of Transportation (Non-Medical):    Physical Activity:      Days of Exercise per Week:      Minutes of Exercise per Session:    Stress:      Feeling of Stress :    Social Connections:      Frequency of Communication with Friends and Family:      Frequency of Social Gatherings with Friends and Family:      Attends Judaism Services:      Active Member of Clubs or Organizations:      Attends Club or Organization Meetings:      Marital Status:    Intimate Partner Violence:      Fear of Current or Ex-Partner:      Emotionally Abused:      Physically Abused:      Sexually Abused:      Family History   Problem Relation Age of Onset     Allergies Mother         angioedema in family members unspecified     Diabetes Mother      Hypertension Mother      Diabetes Father      Hypertension Father      Diabetes Maternal Grandmother      Hypertension Maternal Grandmother      Asthma Maternal Grandmother      Diabetes Maternal Grandfather      Hypertension Maternal Grandfather      Asthma Maternal Grandfather      Cancer No family hx of      Cerebrovascular Disease No family hx of      Thyroid Disease No family hx of      Glaucoma No family hx of      Macular Degeneration No family hx of      Breast Cancer No family hx of      Colon Cancer No family hx of      Depression No family hx of      Anxiety Disorder No family hx of      Genetic Disorder No family hx of      SUBJECTIVE FINDINGS:  A 41-year-old returns to clinic for plantar fasciitis and mid foot pain  bilaterally, peripheral edema.  She relates she got the compression socks and those are good, those have helped.  She has not got the foot orthotics.  She has done physical therapy.  She relates that she has got more flexibility and it is a little bit better, but there are times when it still really hurts.  She says at times she can walk and do about a mile, and at other times she cannot do any running or any activity.  I did review physical therapy notes.    OBJECTIVE FINDINGS:  DP and PT are 2/4 bilaterally.  She has good range of motion bilaterally.  There is no erythema, no drainage, no odor, no calor, no gross tendon voids bilaterally.    ASSESSMENT AND PLAN:  Plantar fasciitis bilaterally.  Mid foot pain bilaterally.  Peripheral edema bilaterally.  This is improved.  Diagnosis and treatment options discussed with her.  Continue the home physical therapy and the compression socks.  We will check with Orthotics and Prosthetics to see if we can figure out what is going on with her foot orthotics and return to clinic and see me as needed.

## 2021-09-16 NOTE — NURSING NOTE
Tanya Denny's chief complaint for this visit includes:  Chief Complaint   Patient presents with     RECHECK     bilateral foot pain - physical therapy did help when she could be consistent     PCP: Amira Early    Referring Provider:  No referring provider defined for this encounter.    /84 (BP Location: Right arm, Patient Position: Sitting, Cuff Size: Adult Regular)   Pulse 68   SpO2 97%   Data Unavailable     Do you need any medication refills at today's visit? Lucrecia Epperson CMA

## 2021-09-16 NOTE — PATIENT INSTRUCTIONS
Thanks for coming today.  [facility]  [address] [place] [address]    To schedule future appointments in Ortho Clinic, you may call [phone].    To schedule ordered imaging by your provider:   Call [facility] [phone]    To schedule an injection ordered by your provider:  Call [facility] Injection scheduling line: [phone]  [email or web address] available online at:  [email or web address]    Please call if any further questions or concerns ([phone]).  Clinic hours 8 am to 5 pm.    Return to clinic (call) if symptoms worsen or fail to improve.

## 2021-09-16 NOTE — LETTER
2021         RE: Tanya Denny  [address]  [place] [address]        Dear Colleague,    Thank you for referring your patient, Tanya Denny, to the [facility]. Please see a copy of my visit note below.    Past Medical History:   Diagnosis Date     Migraine      SAB (spontaneous )     1ST TRIMESTER     Patient Active Problem List   Diagnosis     CARDIOVASCULAR SCREENING; LDL GOAL LESS THAN 160     Dyspepsia     Lumbago     Chronic mixed headache syndrome     Uterine leiomyoma, unspecified location     AMA (advanced maternal age) multigravida 35+     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     Somatic dysfunction of lumbar region     Left foot pain     Past Surgical History:   Procedure Laterality Date     D & C       Social History     Socioeconomic History     Marital status:      Spouse name: Not on file     Number of children: Not on file     Years of education: Not on file     Highest education level: Not on file   Occupational History     Not on file   Tobacco Use     Smoking status: Never Smoker     Smokeless tobacco: Never Used   Substance and Sexual Activity     Alcohol use: No     Drug use: No     Sexual activity: Yes     Partners: Male     Birth control/protection: None   Other Topics Concern     Parent/sibling w/ CABG, MI or angioplasty before 65F 55M? No      Service No     Blood Transfusions No     Caffeine Concern No     Occupational Exposure No     Hobby Hazards No     Sleep Concern No     Stress Concern No     Weight Concern No     Special Diet Yes     Back Care No     Exercise Yes     Bike Helmet No     Seat Belt Yes     Self-Exams Yes   Social History Narrative    Mother of 4, all starting school this yearWorks in  can bring her kids there too.  is a teacher , out of work now.         How much exercise per week? 3 days     How much calcium per day? 1 serving      How much caffeine per day? 3 cups    How much  vitamin D per day?  prenatals    Do you/your family wear seatbelts?  Yes    Do you/your family use safety helmets? na    Do you/your family use sunscreen?No    Do you/your family keep firearms in the home? No    Do you/your family have a smoke detector(s)? Yes        Do you feel safe in your home? Yes    Has anyone ever touched you in an unwanted manner?      Explain         June 11, 2014 Shaneka Abbasi LPN        Reviewed mikal espinoza 12-         Social Determinants of Health     Financial Resource Strain:      Difficulty of Paying Living Expenses:    Food Insecurity:      Worried About Running Out of Food in the Last Year:      Ran Out of Food in the Last Year:    Transportation Needs:      Lack of Transportation (Medical):      Lack of Transportation (Non-Medical):    Physical Activity:      Days of Exercise per Week:      Minutes of Exercise per Session:    Stress:      Feeling of Stress :    Social Connections:      Frequency of Communication with Friends and Family:      Frequency of Social Gatherings with Friends and Family:      Attends Catholic Services:      Active Member of Clubs or Organizations:      Attends Club or Organization Meetings:      Marital Status:    Intimate Partner Violence:      Fear of Current or Ex-Partner:      Emotionally Abused:      Physically Abused:      Sexually Abused:      Family History   Problem Relation Age of Onset     Allergies Mother         angioedema in family members unspecified     Diabetes Mother      Hypertension Mother      Diabetes Father      Hypertension Father      Diabetes Maternal Grandmother      Hypertension Maternal Grandmother      Asthma Maternal Grandmother      Diabetes Maternal Grandfather      Hypertension Maternal Grandfather      Asthma Maternal Grandfather      Cancer No family hx of      Cerebrovascular Disease No family hx of      Thyroid Disease No family hx of      Glaucoma No family hx of      Macular Degeneration No family hx of       Breast Cancer No family hx of      Colon Cancer No family hx of      Depression No family hx of      Anxiety Disorder No family hx of      Genetic Disorder No family hx of      SUBJECTIVE FINDINGS:  A 41-year-old returns to clinic for plantar fasciitis and mid foot pain bilaterally, peripheral edema.  She relates she got the compression socks and those are good, those have helped.  She has not got the foot orthotics.  She has done physical therapy.  She relates that she has got more flexibility and it is a little bit better, but there are times when it still really hurts.  She says at times she can walk and do about a mile, and at other times she cannot do any running or any activity.  I did review physical therapy notes.    OBJECTIVE FINDINGS:  DP and PT are 2/4 bilaterally.  She has good range of motion bilaterally.  There is no erythema, no drainage, no odor, no calor, no gross tendon voids bilaterally.    ASSESSMENT AND PLAN:  Plantar fasciitis bilaterally.  Mid foot pain bilaterally.  Peripheral edema bilaterally.  This is improved.  Diagnosis and treatment options discussed with her.  Continue the home physical therapy and the compression socks.  We will check with Orthotics and Prosthetics to see if we can figure out what is going on with her foot orthotics and return to clinic and see me as needed.            Again, thank you for allowing me to participate in the care of your patient.        Sincerely,        Des Mckeon DPM

## 2021-09-17 ENCOUNTER — OFFICE VISIT (OUTPATIENT)
Dept: FAMILY MEDICINE | Facility: CLINIC | Age: 41
End: 2021-09-17
Payer: COMMERCIAL

## 2021-09-17 VITALS
HEART RATE: 74 BPM | RESPIRATION RATE: 16 BRPM | DIASTOLIC BLOOD PRESSURE: 80 MMHG | HEIGHT: 64 IN | TEMPERATURE: 98 F | SYSTOLIC BLOOD PRESSURE: 102 MMHG | WEIGHT: 214.4 LBS | BODY MASS INDEX: 36.6 KG/M2 | OXYGEN SATURATION: 99 %

## 2021-09-17 DIAGNOSIS — H61.23 BILATERAL IMPACTED CERUMEN: ICD-10-CM

## 2021-09-17 DIAGNOSIS — Z01.00 EXAMINATION OF EYES AND VISION: ICD-10-CM

## 2021-09-17 DIAGNOSIS — Z00.00 ROUTINE GENERAL MEDICAL EXAMINATION AT A HEALTH CARE FACILITY: Primary | ICD-10-CM

## 2021-09-17 DIAGNOSIS — M99.03 SOMATIC DYSFUNCTION OF LUMBAR REGION: ICD-10-CM

## 2021-09-17 DIAGNOSIS — G43.809 OTHER MIGRAINE WITHOUT STATUS MIGRAINOSUS, NOT INTRACTABLE: ICD-10-CM

## 2021-09-17 DIAGNOSIS — Z13.220 LIPID SCREENING: ICD-10-CM

## 2021-09-17 DIAGNOSIS — N76.0 VAGINITIS AND VULVOVAGINITIS: ICD-10-CM

## 2021-09-17 DIAGNOSIS — Z13.29 SCREENING FOR ENDOCRINE DISORDER: ICD-10-CM

## 2021-09-17 DIAGNOSIS — E55.9 VITAMIN D DEFICIENCY: ICD-10-CM

## 2021-09-17 LAB
AMPHETAMINES UR QL: NOT DETECTED
BARBITURATES UR QL SCN: NOT DETECTED
BENZODIAZ UR QL SCN: NOT DETECTED
BUPRENORPHINE UR QL: NOT DETECTED
CANNABINOIDS UR QL: NOT DETECTED
CHOLEST SERPL-MCNC: 199 MG/DL
COCAINE UR QL SCN: NOT DETECTED
D-METHAMPHET UR QL: NOT DETECTED
FASTING STATUS PATIENT QL REPORTED: YES
HDLC SERPL-MCNC: 61 MG/DL
LDLC SERPL CALC-MCNC: 117 MG/DL
METHADONE UR QL SCN: NOT DETECTED
NONHDLC SERPL-MCNC: 138 MG/DL
OPIATES UR QL SCN: NOT DETECTED
OXYCODONE UR QL SCN: NOT DETECTED
PCP UR QL SCN: NOT DETECTED
PROPOXYPH UR QL: NOT DETECTED
TRICYCLICS UR QL SCN: NOT DETECTED
TRIGL SERPL-MCNC: 105 MG/DL
TSH SERPL DL<=0.005 MIU/L-ACNC: 0.79 MU/L (ref 0.4–4)

## 2021-09-17 PROCEDURE — 99396 PREV VISIT EST AGE 40-64: CPT | Performed by: FAMILY MEDICINE

## 2021-09-17 PROCEDURE — 80061 LIPID PANEL: CPT | Performed by: FAMILY MEDICINE

## 2021-09-17 PROCEDURE — 36415 COLL VENOUS BLD VENIPUNCTURE: CPT | Performed by: FAMILY MEDICINE

## 2021-09-17 PROCEDURE — 82306 VITAMIN D 25 HYDROXY: CPT | Performed by: FAMILY MEDICINE

## 2021-09-17 PROCEDURE — 84443 ASSAY THYROID STIM HORMONE: CPT | Performed by: FAMILY MEDICINE

## 2021-09-17 PROCEDURE — 99213 OFFICE O/P EST LOW 20 MIN: CPT | Mod: 25 | Performed by: FAMILY MEDICINE

## 2021-09-17 PROCEDURE — 80306 DRUG TEST PRSMV INSTRMNT: CPT | Performed by: FAMILY MEDICINE

## 2021-09-17 RX ORDER — FLUCONAZOLE 150 MG/1
150 TABLET ORAL WEEKLY
Qty: 4 TABLET | Refills: 0 | Status: SHIPPED | OUTPATIENT
Start: 2021-09-17 | End: 2021-10-09

## 2021-09-17 ASSESSMENT — ENCOUNTER SYMPTOMS
CHILLS: 0
PALPITATIONS: 0
DIARRHEA: 0
COUGH: 0
FEVER: 0
DYSURIA: 0
EYE PAIN: 0
CONSTIPATION: 0
ARTHRALGIAS: 0
MYALGIAS: 0
HEADACHES: 0
HEMATURIA: 0
NERVOUS/ANXIOUS: 0
DIZZINESS: 0
NAUSEA: 0
FREQUENCY: 0
SORE THROAT: 0
BREAST MASS: 0
HEMATOCHEZIA: 0
ABDOMINAL PAIN: 0
WEAKNESS: 0
SHORTNESS OF BREATH: 0
HEARTBURN: 0
JOINT SWELLING: 0
PARESTHESIAS: 0

## 2021-09-17 ASSESSMENT — MIFFLIN-ST. JEOR: SCORE: 1631.48

## 2021-09-17 ASSESSMENT — PAIN SCALES - GENERAL: PAINLEVEL: NO PAIN (0)

## 2021-09-18 ENCOUNTER — HEALTH MAINTENANCE LETTER (OUTPATIENT)
Age: 41
End: 2021-09-18

## 2021-09-18 LAB — DEPRECATED CALCIDIOL+CALCIFEROL SERPL-MC: 42 UG/L (ref 20–75)

## 2021-10-26 NOTE — PROGRESS NOTES
PROGRESS  REPORT    Progress reporting period is from 8/6/2021 to 8/20/2021.       SUBJECTIVE  Patient relates that she is better with regards to B ankle and foot pain.  Likes the red band strengthening.  Able to walk 30 minutes today.  Not able to bike due to too much plantarflexion.      Current Pain level:  2-3/10 dorsum of the R foot   Initial Pain level: 10/10 (at worst).   Changes in function:  Yes (See Goal flowsheet attached for changes in current functional level)  Adverse reaction to treatment or activity: None    OBJECTIVE  Changes noted in objective findings:  Yes, improved DF range of motion   Objective:   L DF 10 deg (improved from 0 deg DF).    Decreased pain with improved ROM after ankle mobilization and passive DF    ASSESSMENT/PLAN  Updated problem list and treatment plan: Diagnosis 1:  B dorsum of foot pain    Pain -  home program  Decreased ROM/flexibility - home program  Decreased joint mobility - home program  Decreased function - home program  STG/LTGs have been met or progress has been made towards goals:  Yes (See Goal flow sheet completed today.)  Assessment of Progress: The patient's condition is improving.  Self Management Plans:  Patient has been instructed in a home treatment program.  Patient  has been instructed in self management of symptoms.    Recommendations:  This patient is ready to be discharged from therapy and continue their home treatment program.  Follow up with MD as needed    Please refer to the daily flowsheet for treatment today, total treatment time and time spent performing 1:1 timed codes.

## 2021-11-08 PROBLEM — M79.672 LEFT FOOT PAIN: Status: RESOLVED | Noted: 2021-08-06 | Resolved: 2021-11-08

## 2021-12-10 DIAGNOSIS — Z3A.01 PREGNANCY WITH 7 COMPLETED WEEKS GESTATION: ICD-10-CM

## 2021-12-10 RX ORDER — PRENATAL VIT,CAL 76/IRON/FOLIC 29 MG-1 MG
TABLET ORAL
Qty: 30 TABLET | Refills: 23 | Status: SHIPPED | OUTPATIENT
Start: 2021-12-10 | End: 2023-12-12

## 2021-12-10 NOTE — TELEPHONE ENCOUNTER
Pending Prescriptions:                       Disp   Refills    Prenatal Vit-Iron Carbonyl-FA (PRENATABS R*30 tab*23       Sig: TAKE 1 TABLET BY MOUTH EVERY DAY    Routing refill request to provider for review/approval because:  A break in medication (Rx is only good for one year)

## 2021-12-14 NOTE — PROGRESS NOTES
Patient did not return for further treatment and no additional progress was noted.  Please refer to the progress note and goal flowsheet completed on 8/20/2021 for discharge information.

## 2022-02-02 ENCOUNTER — OFFICE VISIT (OUTPATIENT)
Dept: FAMILY MEDICINE | Facility: CLINIC | Age: 42
End: 2022-02-02
Payer: COMMERCIAL

## 2022-02-02 ENCOUNTER — ANCILLARY PROCEDURE (OUTPATIENT)
Dept: GENERAL RADIOLOGY | Facility: CLINIC | Age: 42
End: 2022-02-02
Attending: FAMILY MEDICINE
Payer: COMMERCIAL

## 2022-02-02 DIAGNOSIS — R06.02 SOB (SHORTNESS OF BREATH): ICD-10-CM

## 2022-02-02 DIAGNOSIS — R06.02 SOB (SHORTNESS OF BREATH): Primary | ICD-10-CM

## 2022-02-02 LAB
ALBUMIN SERPL-MCNC: 3.4 G/DL (ref 3.4–5)
ALP SERPL-CCNC: 69 U/L (ref 40–150)
ALT SERPL W P-5'-P-CCNC: 23 U/L (ref 0–50)
ANION GAP SERPL CALCULATED.3IONS-SCNC: 3 MMOL/L (ref 3–14)
AST SERPL W P-5'-P-CCNC: 15 U/L (ref 0–45)
BASOPHILS # BLD AUTO: 0 10E3/UL (ref 0–0.2)
BASOPHILS NFR BLD AUTO: 0 %
BILIRUB SERPL-MCNC: 0.2 MG/DL (ref 0.2–1.3)
BUN SERPL-MCNC: 8 MG/DL (ref 7–30)
CALCIUM SERPL-MCNC: 8.6 MG/DL (ref 8.5–10.1)
CHLORIDE BLD-SCNC: 108 MMOL/L (ref 94–109)
CO2 SERPL-SCNC: 29 MMOL/L (ref 20–32)
CREAT SERPL-MCNC: 0.73 MG/DL (ref 0.52–1.04)
EOSINOPHIL # BLD AUTO: 0.1 10E3/UL (ref 0–0.7)
EOSINOPHIL NFR BLD AUTO: 2 %
ERYTHROCYTE [DISTWIDTH] IN BLOOD BY AUTOMATED COUNT: 13.7 % (ref 10–15)
GFR SERPL CREATININE-BSD FRML MDRD: >90 ML/MIN/1.73M2
GLUCOSE BLD-MCNC: 109 MG/DL (ref 70–99)
HCT VFR BLD AUTO: 37.6 % (ref 35–47)
HGB BLD-MCNC: 12.3 G/DL (ref 11.7–15.7)
IRON SATN MFR SERPL: 22 % (ref 15–46)
IRON SERPL-MCNC: 94 UG/DL (ref 35–180)
LYMPHOCYTES # BLD AUTO: 1.7 10E3/UL (ref 0.8–5.3)
LYMPHOCYTES NFR BLD AUTO: 26 %
MCH RBC QN AUTO: 26.6 PG (ref 26.5–33)
MCHC RBC AUTO-ENTMCNC: 32.7 G/DL (ref 31.5–36.5)
MCV RBC AUTO: 81 FL (ref 78–100)
MONOCYTES # BLD AUTO: 0.4 10E3/UL (ref 0–1.3)
MONOCYTES NFR BLD AUTO: 6 %
NEUTROPHILS # BLD AUTO: 4.5 10E3/UL (ref 1.6–8.3)
NEUTROPHILS NFR BLD AUTO: 66 %
NT-PROBNP SERPL-MCNC: 76 PG/ML (ref 0–125)
PLATELET # BLD AUTO: 315 10E3/UL (ref 150–450)
POTASSIUM BLD-SCNC: 3.8 MMOL/L (ref 3.4–5.3)
PROT SERPL-MCNC: 7.5 G/DL (ref 6.8–8.8)
RBC # BLD AUTO: 4.63 10E6/UL (ref 3.8–5.2)
SODIUM SERPL-SCNC: 140 MMOL/L (ref 133–144)
TIBC SERPL-MCNC: 421 UG/DL (ref 240–430)
TSH SERPL DL<=0.005 MIU/L-ACNC: 0.8 MU/L (ref 0.4–4)
WBC # BLD AUTO: 6.7 10E3/UL (ref 4–11)

## 2022-02-02 PROCEDURE — 71046 X-RAY EXAM CHEST 2 VIEWS: CPT | Performed by: RADIOLOGY

## 2022-02-02 PROCEDURE — 99214 OFFICE O/P EST MOD 30 MIN: CPT | Performed by: FAMILY MEDICINE

## 2022-02-02 PROCEDURE — 36415 COLL VENOUS BLD VENIPUNCTURE: CPT | Performed by: FAMILY MEDICINE

## 2022-02-02 PROCEDURE — 83880 ASSAY OF NATRIURETIC PEPTIDE: CPT | Performed by: FAMILY MEDICINE

## 2022-02-02 PROCEDURE — 80050 GENERAL HEALTH PANEL: CPT | Performed by: FAMILY MEDICINE

## 2022-02-02 PROCEDURE — 83550 IRON BINDING TEST: CPT | Performed by: FAMILY MEDICINE

## 2022-02-02 NOTE — PROGRESS NOTES
Assessment & Plan     SOB (shortness of breath)  Differentials for deconditioning, Reactive airway, doubt CAD but diagnostic orders as below, further plans will depend on results and her clinical status. She is quite worried as her father recently  of an MI.  - XR Chest 2 Views; Future  - BNP-N terminal pro; Future  - CBC with platelets and differential; Future  - Comprehensive metabolic panel  - Iron and iron binding capacity; Future  - TSH with free T4 reflex; Future  - General PFT Lab (Please always keep checked); Future  - CT Coronary Calcium Scan; Future  - BNP-N terminal pro  - CBC with platelets and differential  - Iron and iron binding capacity  - TSH with free T4 reflex        Return if symptoms worsen or fail to improve.    Mele Floyd MD  [facility] GREGORY Martínez is a 42 year old who presents for the following health issues  accompanied by her Self.    HPI   Dyspnea  Patient complains of shortness of breath exertional and while cleaning or doing house chores..  She denies cough, post nasal drip, productive cough, shortness of breath, sputum production, tightness in chest, unresolving pneumonia and wheezing. Symptoms began 1 month ago, waxing and waning since that time. 10 months post partum. Weight has increased 15 pounds pounds over last 6 months.  Appetite has been unaffected.       Review of Systems   Constitutional, HEENT, cardiovascular, pulmonary, GI, , musculoskeletal, neuro, skin, endocrine and psych systems are negative, except as otherwise noted.      Objective    /70   Pulse 75   Temp 97.5  F (36.4  C)   Resp 18   Wt 99.8 kg (220 lb)   SpO2 100%   BMI 37.47 kg/m    Body mass index is 37.47 kg/m .  Physical Exam   GENERAL: healthy, alert and no distress  EYES: Eyes grossly normal to inspection, PERRL and conjunctivae and sclerae normal  HENT: ear canals and TM's normal, nose and mouth without ulcers or lesions  NECK: no adenopathy, no  asymmetry, masses, or scars and thyroid normal to palpation  RESP: lungs clear to auscultation - no rales, rhonchi or wheezes  CV: regular rate and rhythm, normal S1 S2, no S3 or S4, no murmur, click or rub, no peripheral edema and peripheral pulses strong  ABDOMEN: soft, nontender, no hepatosplenomegaly, no masses and bowel sounds normal  MS: no gross musculoskeletal defects noted, no edema

## 2022-02-03 VITALS
HEART RATE: 75 BPM | SYSTOLIC BLOOD PRESSURE: 125 MMHG | DIASTOLIC BLOOD PRESSURE: 70 MMHG | TEMPERATURE: 97.5 F | WEIGHT: 220 LBS | OXYGEN SATURATION: 100 % | BODY MASS INDEX: 37.47 KG/M2 | RESPIRATION RATE: 18 BRPM

## 2022-02-21 ENCOUNTER — TELEPHONE (OUTPATIENT)
Dept: FAMILY MEDICINE | Facility: CLINIC | Age: 42
End: 2022-02-21
Payer: COMMERCIAL

## 2022-02-21 NOTE — TELEPHONE ENCOUNTER
Patient has appointment scheduled to see HI on 2/23 for physical and chest pain. RN, please triage chest pain if appropriate.

## 2022-02-22 NOTE — TELEPHONE ENCOUNTER
No need to triage chest pain. She was just evaluated by me and ACS ruled out. I will see her 2/23. Thank you.  Mele Floyd MD on 2/22/2022 at 2:43 AM

## 2022-03-23 DIAGNOSIS — R60.0 PERIPHERAL EDEMA: ICD-10-CM

## 2022-03-23 DIAGNOSIS — M72.2 PLANTAR FASCIITIS, BILATERAL: ICD-10-CM

## 2022-03-23 DIAGNOSIS — M79.671 BILATERAL FOOT PAIN: Primary | ICD-10-CM

## 2022-03-23 DIAGNOSIS — M79.672 BILATERAL FOOT PAIN: Primary | ICD-10-CM

## 2022-05-29 DIAGNOSIS — N76.0 VAGINITIS AND VULVOVAGINITIS: ICD-10-CM

## 2022-05-31 DIAGNOSIS — B37.31 CANDIDA VAGINITIS: Primary | ICD-10-CM

## 2022-05-31 RX ORDER — FLUCONAZOLE 150 MG/1
150 TABLET ORAL
Qty: 3 TABLET | Refills: 0 | Status: SHIPPED | OUTPATIENT
Start: 2022-05-31 | End: 2022-06-07

## 2022-06-01 RX ORDER — FLUCONAZOLE 150 MG/1
150 TABLET ORAL WEEKLY
Qty: 2 TABLET | Refills: 1 | OUTPATIENT
Start: 2022-06-01 | End: 2022-06-23

## 2022-06-14 ENCOUNTER — TELEPHONE (OUTPATIENT)
Dept: FAMILY MEDICINE | Facility: CLINIC | Age: 42
End: 2022-06-14
Payer: COMMERCIAL

## 2022-06-14 NOTE — TELEPHONE ENCOUNTER
Patient is requesting appointment this week for multiple concerns and pain. Please add to any same day or virtual appointment this week. Thank you. The patient would need to be notified of time and day.     Mele Floyd MD on 6/14/2022 at 12:34 PM

## 2022-06-15 NOTE — TELEPHONE ENCOUNTER
Staff called and set up appointment    Next 5 appointments (look out 90 days)    Jun 16, 2022  9:00 AM  (Arrive by 8:40 AM)  Provider Visit with Mele Floyd MD  [facility] ([facility] [place] ) [address], [address]  [facility] [address] [phone]

## 2022-06-16 ENCOUNTER — VIRTUAL VISIT (OUTPATIENT)
Dept: FAMILY MEDICINE | Facility: CLINIC | Age: 42
End: 2022-06-16
Payer: COMMERCIAL

## 2022-06-16 DIAGNOSIS — R10.2 PELVIC PAIN IN FEMALE: Primary | ICD-10-CM

## 2022-06-16 PROBLEM — E66.9 OBESITY: Status: ACTIVE | Noted: 2019-02-01

## 2022-06-16 PROBLEM — J45.909 ASTHMA: Status: ACTIVE | Noted: 2022-06-16

## 2022-06-16 PROBLEM — N71.9 ENDOMETRITIS: Status: ACTIVE | Noted: 2021-04-26

## 2022-06-16 PROBLEM — O09.523 MULTIGRAVIDA OF ADVANCED MATERNAL AGE IN THIRD TRIMESTER: Status: ACTIVE | Noted: 2019-02-01

## 2022-06-16 PROBLEM — K21.9 GERD (GASTROESOPHAGEAL REFLUX DISEASE): Status: ACTIVE | Noted: 2018-07-30

## 2022-06-16 PROCEDURE — 99214 OFFICE O/P EST MOD 30 MIN: CPT | Mod: 95 | Performed by: FAMILY MEDICINE

## 2022-06-16 PROCEDURE — 80053 COMPREHEN METABOLIC PANEL: CPT | Performed by: FAMILY MEDICINE

## 2022-06-16 RX ORDER — IBUPROFEN 600 MG/1
600 TABLET, FILM COATED ORAL EVERY 6 HOURS PRN
Qty: 30 TABLET | Refills: 0 | Status: SHIPPED | OUTPATIENT
Start: 2022-06-16 | End: 2023-01-06

## 2022-06-16 RX ORDER — ETONOGESTREL AND ETHINYL ESTRADIOL .12; .015 MG/D; MG/D
RING VAGINAL
COMMUNITY
Start: 2022-05-26 | End: 2022-07-13

## 2022-06-16 NOTE — PROGRESS NOTES
"Tanya is a 42 year old who is being evaluated via a billable telephone visit.      What phone number would you like to be contacted at? [phone]  How would you like to obtain your AVS? MyChart    Assessment & Plan     Pelvic pain in female  Differentials for ovarian cyst, fibroid, cystitis, endometritis or vaginitis.   Diagnostic orders as below further plans will depend on the patient's clinical status.  - ibuprofen (ADVIL/MOTRIN) 600 MG tablet; Take 1 tablet (600 mg) by mouth every 6 hours as needed for moderate pain  - CBC with platelets and differential; Future  - UA with Microscopic reflex to Culture - lab collect; Future  - Comprehensive metabolic panel  - Lipase; Future  - HCG qualitative urine; Future  - Chlamydia trachomatis PCR; Future  - US Pelvic Complete with Transvaginal; Future    Estimated body mass index is 37.47 kg/m  as calculated from the following:    Height as of 9/17/21: 1.632 m (5' 4.25\").    Weight as of 2/2/22: 99.8 kg (220 lb).   Weight management plan: Discussed healthy diet and exercise guidelines        No follow-ups on file.    Mele Floyd MD  [facility] GREGORY Martínez is a 42 year old accompanied by her Self., presenting for the following health issues:  No chief complaint on file.    LMP; 06/1 06/8  LIZZETTE RING removed by patient 06/10.  Abdominal Pain      Duration: 06/10. Treated for yeast  06/09    Description (location/character/radiation): cramping, burning with urination, dysuria, vaginal discomfort, no hematuria or fever       Associated flank pain: None    Intensity:  mild, moderate    Accompanying signs and symptoms:        Fever/Chills: no        Gas/Bloating: no        Nausea/vomitting: no        Diarrhea: no        Dysuria or Hematuria: no     History (previous similar pain/trauma/previous testing): NO    Precipitating or alleviating factors:       Pain worse with eating/BM/urination: N/A       Pain relieved by BM: no     Therapies " tried and outcome: ibuprofen            Review of Systems   Constitutional, HEENT, cardiovascular, pulmonary, GI, , musculoskeletal, neuro, skin, endocrine and psych systems are negative, except as otherwise noted.      Objective           Vitals:  No vitals were obtained today due to virtual visit.    Physical Exam   healthy, alert and no distress  PSYCH: Alert and oriented times 3; coherent speech, normal   rate and volume, able to articulate logical thoughts, able   to abstract reason, no tangential thoughts, no hallucinations   or delusions  Her affect is normal  RESP: No cough, no audible wheezing, able to talk in full sentences  Remainder of exam unable to be completed due to telephone visits            Phone call duration: 30  minutes    .  ..

## 2022-06-20 ENCOUNTER — ANCILLARY PROCEDURE (OUTPATIENT)
Dept: ULTRASOUND IMAGING | Facility: CLINIC | Age: 42
End: 2022-06-20
Attending: FAMILY MEDICINE
Payer: COMMERCIAL

## 2022-06-20 ENCOUNTER — LAB (OUTPATIENT)
Dept: LAB | Facility: CLINIC | Age: 42
End: 2022-06-20
Payer: COMMERCIAL

## 2022-06-20 DIAGNOSIS — R10.2 PELVIC PAIN IN FEMALE: ICD-10-CM

## 2022-06-20 LAB
ALBUMIN SERPL-MCNC: 3.4 G/DL (ref 3.4–5)
ALBUMIN UR-MCNC: NEGATIVE MG/DL
ALP SERPL-CCNC: 68 U/L (ref 40–150)
ALT SERPL W P-5'-P-CCNC: 27 U/L (ref 0–50)
ANION GAP SERPL CALCULATED.3IONS-SCNC: 5 MMOL/L (ref 3–14)
APPEARANCE UR: CLEAR
AST SERPL W P-5'-P-CCNC: 14 U/L (ref 0–45)
BASOPHILS # BLD AUTO: 0 10E3/UL (ref 0–0.2)
BASOPHILS NFR BLD AUTO: 0 %
BILIRUB SERPL-MCNC: 0.4 MG/DL (ref 0.2–1.3)
BILIRUB UR QL STRIP: NEGATIVE
BUN SERPL-MCNC: 9 MG/DL (ref 7–30)
CALCIUM SERPL-MCNC: 8.7 MG/DL (ref 8.5–10.1)
CHLORIDE BLD-SCNC: 108 MMOL/L (ref 94–109)
CO2 SERPL-SCNC: 27 MMOL/L (ref 20–32)
COLOR UR AUTO: ABNORMAL
CREAT SERPL-MCNC: 0.68 MG/DL (ref 0.52–1.04)
EOSINOPHIL # BLD AUTO: 0.1 10E3/UL (ref 0–0.7)
EOSINOPHIL NFR BLD AUTO: 1 %
ERYTHROCYTE [DISTWIDTH] IN BLOOD BY AUTOMATED COUNT: 12.3 % (ref 10–15)
GFR SERPL CREATININE-BSD FRML MDRD: >90 ML/MIN/1.73M2
GLUCOSE BLD-MCNC: 117 MG/DL (ref 70–99)
GLUCOSE UR STRIP-MCNC: NEGATIVE MG/DL
HCG UR QL: NEGATIVE
HCT VFR BLD AUTO: 36.8 % (ref 35–47)
HGB BLD-MCNC: 12.3 G/DL (ref 11.7–15.7)
HGB UR QL STRIP: ABNORMAL
IMM GRANULOCYTES # BLD: 0 10E3/UL
IMM GRANULOCYTES NFR BLD: 0 %
KETONES UR STRIP-MCNC: NEGATIVE MG/DL
LEUKOCYTE ESTERASE UR QL STRIP: NEGATIVE
LIPASE SERPL-CCNC: 165 U/L (ref 73–393)
LYMPHOCYTES # BLD AUTO: 2.1 10E3/UL (ref 0.8–5.3)
LYMPHOCYTES NFR BLD AUTO: 32 %
MCH RBC QN AUTO: 27.3 PG (ref 26.5–33)
MCHC RBC AUTO-ENTMCNC: 33.4 G/DL (ref 31.5–36.5)
MCV RBC AUTO: 82 FL (ref 78–100)
MONOCYTES # BLD AUTO: 0.5 10E3/UL (ref 0–1.3)
MONOCYTES NFR BLD AUTO: 7 %
NEUTROPHILS # BLD AUTO: 4 10E3/UL (ref 1.6–8.3)
NEUTROPHILS NFR BLD AUTO: 60 %
NITRATE UR QL: NEGATIVE
NRBC # BLD AUTO: 0 10E3/UL
NRBC BLD AUTO-RTO: 0 /100
PH UR STRIP: 6.5 [PH] (ref 5–7)
PLATELET # BLD AUTO: 218 10E3/UL (ref 150–450)
POTASSIUM BLD-SCNC: 3.8 MMOL/L (ref 3.4–5.3)
PROT SERPL-MCNC: 7.3 G/DL (ref 6.8–8.8)
RBC # BLD AUTO: 4.5 10E6/UL (ref 3.8–5.2)
RBC #/AREA URNS AUTO: ABNORMAL /HPF
SODIUM SERPL-SCNC: 140 MMOL/L (ref 133–144)
SP GR UR STRIP: 1.01 (ref 1–1.03)
SQUAMOUS #/AREA URNS AUTO: ABNORMAL /LPF
UROBILINOGEN UR STRIP-MCNC: NORMAL MG/DL
WBC # BLD AUTO: 6.8 10E3/UL (ref 4–11)
WBC #/AREA URNS AUTO: ABNORMAL /HPF

## 2022-06-20 PROCEDURE — 83690 ASSAY OF LIPASE: CPT

## 2022-06-20 PROCEDURE — 81025 URINE PREGNANCY TEST: CPT

## 2022-06-20 PROCEDURE — 36415 COLL VENOUS BLD VENIPUNCTURE: CPT

## 2022-06-20 PROCEDURE — 87491 CHLMYD TRACH DNA AMP PROBE: CPT

## 2022-06-20 PROCEDURE — 81001 URINALYSIS AUTO W/SCOPE: CPT

## 2022-06-20 PROCEDURE — 76830 TRANSVAGINAL US NON-OB: CPT | Performed by: STUDENT IN AN ORGANIZED HEALTH CARE EDUCATION/TRAINING PROGRAM

## 2022-06-20 PROCEDURE — 85025 COMPLETE CBC W/AUTO DIFF WBC: CPT

## 2022-06-20 PROCEDURE — 76856 US EXAM PELVIC COMPLETE: CPT | Performed by: STUDENT IN AN ORGANIZED HEALTH CARE EDUCATION/TRAINING PROGRAM

## 2022-06-21 LAB — C TRACH DNA SPEC QL NAA+PROBE: NEGATIVE

## 2022-07-09 ENCOUNTER — MYC MEDICAL ADVICE (OUTPATIENT)
Dept: FAMILY MEDICINE | Facility: CLINIC | Age: 42
End: 2022-07-09

## 2022-07-10 ENCOUNTER — OFFICE VISIT (OUTPATIENT)
Dept: URGENT CARE | Facility: URGENT CARE | Age: 42
End: 2022-07-10
Payer: COMMERCIAL

## 2022-07-10 VITALS
DIASTOLIC BLOOD PRESSURE: 79 MMHG | HEART RATE: 75 BPM | BODY MASS INDEX: 36.31 KG/M2 | SYSTOLIC BLOOD PRESSURE: 114 MMHG | WEIGHT: 213.2 LBS | TEMPERATURE: 98.3 F | OXYGEN SATURATION: 97 % | RESPIRATION RATE: 20 BRPM

## 2022-07-10 DIAGNOSIS — N89.8 VAGINAL DISCHARGE: Primary | ICD-10-CM

## 2022-07-10 DIAGNOSIS — B37.31 CANDIDIASIS OF VAGINA: ICD-10-CM

## 2022-07-10 DIAGNOSIS — E66.01 MORBID OBESITY (H): ICD-10-CM

## 2022-07-10 LAB
ALBUMIN UR-MCNC: NEGATIVE MG/DL
APPEARANCE UR: CLEAR
BACTERIA #/AREA URNS HPF: ABNORMAL /HPF
BILIRUB UR QL STRIP: NEGATIVE
CLUE CELLS: ABNORMAL
COLOR UR AUTO: YELLOW
GLUCOSE UR STRIP-MCNC: NEGATIVE MG/DL
HGB UR QL STRIP: ABNORMAL
KETONES UR STRIP-MCNC: NEGATIVE MG/DL
LEUKOCYTE ESTERASE UR QL STRIP: NEGATIVE
NITRATE UR QL: NEGATIVE
PH UR STRIP: 6.5 [PH] (ref 5–7)
RBC #/AREA URNS AUTO: ABNORMAL /HPF
SP GR UR STRIP: 1.01 (ref 1–1.03)
SQUAMOUS #/AREA URNS AUTO: ABNORMAL /LPF
TRICHOMONAS, WET PREP: ABNORMAL
UROBILINOGEN UR STRIP-ACNC: 0.2 E.U./DL
WBC #/AREA URNS AUTO: ABNORMAL /HPF
WBC'S/HIGH POWER FIELD, WET PREP: ABNORMAL
YEAST, WET PREP: PRESENT

## 2022-07-10 PROCEDURE — 99203 OFFICE O/P NEW LOW 30 MIN: CPT | Performed by: PHYSICIAN ASSISTANT

## 2022-07-10 PROCEDURE — 87210 SMEAR WET MOUNT SALINE/INK: CPT | Performed by: PHYSICIAN ASSISTANT

## 2022-07-10 PROCEDURE — 81001 URINALYSIS AUTO W/SCOPE: CPT | Performed by: PHYSICIAN ASSISTANT

## 2022-07-10 RX ORDER — CLOTRIMAZOLE 1 %
CREAM (GRAM) TOPICAL 2 TIMES DAILY
Qty: 12 G | Refills: 0 | Status: SHIPPED | OUTPATIENT
Start: 2022-07-10 | End: 2023-01-06

## 2022-07-10 NOTE — PROGRESS NOTES
SUBJECTIVE:   Tanya Denny is a 42 year old female presenting with a chief complaint of   Chief Complaint   Patient presents with     Vaginal Problem     Rash     Left breast       She is an established patient of [facility].  Patient presents with left nipple yeast infection and vaginal discharge and itching.  Patient has had before and is asking for treatment.  She is currently nursing and has called pediatrician for treatment for her baby for thrush.  States itches and hurts.  Not diabetic. Recent menses.          Review of Systems   Genitourinary: Positive for vaginal discharge.   Skin: Positive for rash.   All other systems reviewed and are negative.      Past Medical History:   Diagnosis Date     Migraine      SAB (spontaneous )     1ST TRIMESTER     Family History   Problem Relation Age of Onset     Allergies Mother         angioedema in family members unspecified     Diabetes Mother      Hypertension Mother      Diabetes Father      Hypertension Father      Diabetes Maternal Grandmother      Hypertension Maternal Grandmother      Asthma Maternal Grandmother      Diabetes Maternal Grandfather      Hypertension Maternal Grandfather      Asthma Maternal Grandfather      Cancer No family hx of      Cerebrovascular Disease No family hx of      Thyroid Disease No family hx of      Glaucoma No family hx of      Macular Degeneration No family hx of      Breast Cancer No family hx of      Colon Cancer No family hx of      Depression No family hx of      Anxiety Disorder No family hx of      Genetic Disorder No family hx of      Current Outpatient Medications   Medication Sig Dispense Refill     carbamide peroxide (DEBROX) 6.5 % otic solution Place 5 drops Into the left ear 2 times daily 15 mL 0     Cholecalciferol (VITAMIN D PO) Take 1,000 Units by mouth daily       clotrimazole (LOTRIMIN) 1 % external cream Apply topically 2 times daily 12 g 0     ELURYNG 0.12-0.015 MG/24HR vaginal ring INSERT 1  RING EVERY 4WKS ON OR BEFORE 5TH DAY OF CYCLE. REMOVE AFTER 3WKS. AFTER 1WK INSERT NEW RING       ferrous fumarate 65 mg, [place]. FE,-Vitamin C 125 mg (VITRON C)  MG TABS tablet Take 1 tablet by mouth daily       ferrous sulfate (FEROSUL) 325 (65 Fe) MG tablet TAKE 1 TABLET BY MOUTH EVERY DAY WITH BREAKFAST       ibuprofen (ADVIL/MOTRIN) 600 MG tablet Take 1 tablet (600 mg) by mouth every 6 hours as needed for moderate pain 30 tablet 0     ibuprofen (ADVIL/MOTRIN) 800 MG tablet Take 1 tablet (800 mg) by mouth every 8 hours as needed for moderate pain 60 tablet 1     miconazole (MONISTAT 1 DAY OR NIGHT) 1200 & 2 MG & % kit As directed 1 kit 1     omeprazole 20 MG tablet Take 20 mg by mouth daily       Prenatal Vit-Iron Carbonyl-FA (PRENATABS RX) 29-1 MG TABS TAKE 1 TABLET BY MOUTH EVERY DAY 30 tablet 23     Social History     Tobacco Use     Smoking status: Never Smoker     Smokeless tobacco: Never Used   Substance Use Topics     Alcohol use: No       OBJECTIVE  /79   Pulse 75   Temp 98.3  F (36.8  C) (Tympanic)   Resp 20   Wt 96.7 kg (213 lb 3.2 oz)   SpO2 97%   BMI 36.31 kg/m      Physical Exam  Vitals and nursing note reviewed.   Constitutional:       Appearance: Normal appearance. She is obese.   Eyes:      Extraocular Movements: Extraocular movements intact.      Conjunctiva/sclera: Conjunctivae normal.   Cardiovascular:      Rate and Rhythm: Normal rate.   Neurological:      General: No focal deficit present.      Mental Status: She is alert.   Psychiatric:         Mood and Affect: Mood normal.         Behavior: Behavior normal.         Labs:  Results for orders placed or performed in visit on 07/10/22 (from the past 24 hour(s))   Wet prep - lab collect    Specimen: Vagina; Swab   Result Value Ref Range    Trichomonas Absent Absent    Yeast Present (A) Absent    Clue Cells Absent Absent    WBCs/high power field 1+ (A) None   UA Macro with Reflex to Micro and Culture - lab collect    Specimen:  Urine, Clean Catch   Result Value Ref Range    Color Urine Yellow Colorless, Straw, Light Yellow, Yellow    Appearance Urine Clear Clear    Glucose Urine Negative Negative mg/dL    Bilirubin Urine Negative Negative    Ketones Urine Negative Negative mg/dL    Specific Gravity Urine 1.010 1.003 - 1.035    Blood Urine Small (A) Negative    pH Urine 6.5 5.0 - 7.0    Protein Albumin Urine Negative Negative mg/dL    Urobilinogen Urine 0.2 0.2, 1.0 E.U./dL    Nitrite Urine Negative Negative    Leukocyte Esterase Urine Negative Negative   Urine Microscopic   Result Value Ref Range    Bacteria Urine Moderate (A) None Seen /HPF    RBC Urine 2-5 (A) 0-2 /HPF /HPF    WBC Urine 0-5 0-5 /HPF /HPF    Squamous Epithelials Urine Few (A) None Seen /LPF    Narrative    Urine Culture not indicated       X-Ray was not done.    ASSESSMENT:      ICD-10-CM    1. Vaginal discharge  N89.8 Wet prep - lab collect     UA Macro with Reflex to Micro and Culture - lab collect     Wet prep - lab collect     UA Macro with Reflex to Micro and Culture - lab collect     Urine Microscopic   2. Candidiasis of vagina  B37.3 miconazole (MONISTAT 1 DAY OR NIGHT) 1200 & 2 MG & % kit     clotrimazole (LOTRIMIN) 1 % external cream   3. Morbid obesity (H)  E66.01         Medical Decision Making:    Differential Diagnosis:  yeast    Serious Comorbid Conditions:  Adult:  reviewed    PLAN:    Rx for monistat and clotrimazole for nipple.  Discussed probiotics or yogurt daily.      Followup:    If not improving or if condition worsens, follow up with your Primary Care Provider, If not improving or if conditions worsens over the next 12-24 hours, go to the Emergency Department    There are no Patient Instructions on file for this visit.

## 2022-07-13 ENCOUNTER — OFFICE VISIT (OUTPATIENT)
Dept: FAMILY MEDICINE | Facility: CLINIC | Age: 42
End: 2022-07-13
Payer: COMMERCIAL

## 2022-07-13 VITALS
DIASTOLIC BLOOD PRESSURE: 78 MMHG | RESPIRATION RATE: 16 BRPM | HEART RATE: 84 BPM | OXYGEN SATURATION: 100 % | HEIGHT: 64 IN | BODY MASS INDEX: 37.2 KG/M2 | WEIGHT: 217.9 LBS | TEMPERATURE: 98.1 F | SYSTOLIC BLOOD PRESSURE: 110 MMHG

## 2022-07-13 DIAGNOSIS — B35.4 TINEA CORPORIS: ICD-10-CM

## 2022-07-13 DIAGNOSIS — B37.31 CANDIDA VAGINITIS: Primary | ICD-10-CM

## 2022-07-13 DIAGNOSIS — E66.01 MORBID OBESITY (H): ICD-10-CM

## 2022-07-13 PROCEDURE — 99213 OFFICE O/P EST LOW 20 MIN: CPT | Performed by: FAMILY MEDICINE

## 2022-07-13 RX ORDER — FLUCONAZOLE 150 MG/1
150 TABLET ORAL
Qty: 3 TABLET | Refills: 0 | Status: SHIPPED | OUTPATIENT
Start: 2022-07-13 | End: 2022-07-20

## 2022-07-13 NOTE — PROGRESS NOTES
Answers for HPI/ROS submitted by the patient on 7/13/2022  How many servings of fruits and vegetables do you eat daily?: 2-3  On average, how many sweetened beverages do you drink each day (Examples: soda, juice, sweet tea, etc.  Do NOT count diet or artificially sweetened beverages)?: 0  How many minutes a day do you exercise enough to make your heart beat faster?: 9 or less  How many days a week do you exercise enough to make your heart beat faster?: 3 or less  How many days per week do you miss taking your medication?: 0  What is the reason for your visit today?: Check up  When did your symptoms begin?: Today  What are your symptoms?: No  How would you describe these symptoms?: Mild  Have you had these symptoms before?: No  Is there anything that makes you feel worse?: No  Is there anything that makes you feel better?: Yes      Assessment & Plan     Candida vaginitis  - fluconazole (DIFLUCAN) 150 MG tablet; Take 1 tablet (150 mg) by mouth every 72 hours for 3 doses    Tinea corporis  Continue clotrimazole, much improved.  Morbid obesity (H)  Weight management plan: Discussed healthy diet and exercise guidelines     No follow-ups on file.    Mele Floyd MD  [facility] GREGORY Martínez is a 42 year old accompanied by her Self, presenting for the following health issues:  Rash        Rash  Onset/Duration: 1.5 weeks  Description  Location: Both breast and  symptoms.  Character:   Itching: Yes  Intensity:  mild  Progression of Symptoms:  improving  Accompanying signs and symptoms:   Fever: No  Body aches or joint pain: No  Sore throat symptoms: No  Recent cold symptoms: No  History:           Previous episodes of similar rash: None  New exposures:  None  Recent travel: No  Exposure to similar rash: No  Precipitating or alleviating factors:   Therapies tried and outcome: lotrimin cream - helps with rash      ED/UC Followup:    Facility:  [facility]  "[place]  Date of visit: 7/10/2022  Reason for visit:candidiasis of vagina  Current Status: Still has vaginal discharge and yeast infection. Patient states she was prescribed medication, but pharmacy did not have it.      Review of Systems   Constitutional, HEENT, cardiovascular, pulmonary, GI, , musculoskeletal, neuro, skin, endocrine and psych systems are negative, except as otherwise noted.      Objective    /78   Pulse 84   Temp 98.1  F (36.7  C) (Temporal)   Resp 16   Ht 1.627 m (5' 4.06\")   Wt 98.8 kg (217 lb 14.4 oz)   SpO2 100%   BMI 37.34 kg/m    Body mass index is 37.34 kg/m .  Physical Exam   GENERAL: healthy, alert and no distress  NECK: no adenopathy, no asymmetry, masses, or scars and thyroid normal to palpation  RESP: lungs clear to auscultation - no rales, rhonchi or wheezes  BREAST: normal without masses, tenderness or nipple discharge and no palpable axillary masses or adenopathy  CV: regular rate and rhythm, normal S1 S2, no S3 or S4, no murmur, click or rub, no peripheral edema and peripheral pulses strong  ABDOMEN: soft, nontender, no hepatosplenomegaly, no masses and bowel sounds normal                .  ..  Answers for HPI/ROS submitted by the patient on 7/13/2022  How many servings of fruits and vegetables do you eat daily?: 2-3  On average, how many sweetened beverages do you drink each day (Examples: soda, juice, sweet tea, etc.  Do NOT count diet or artificially sweetened beverages)?: 0  How many minutes a day do you exercise enough to make your heart beat faster?: 9 or less  How many days a week do you exercise enough to make your heart beat faster?: 3 or less  How many days per week do you miss taking your medication?: 0  What is the reason for your visit today?: Check up  When did your symptoms begin?: Today  What are your symptoms?: No  How would you describe these symptoms?: Mild  Have you had these symptoms before?: No  Is there anything that makes you feel worse?: No  Is " there anything that makes you feel better?: Yes      Answers for HPI/ROS submitted by the patient on 7/13/2022  How many servings of fruits and vegetables do you eat daily?: 2-3  On average, how many sweetened beverages do you drink each day (Examples: soda, juice, sweet tea, etc.  Do NOT count diet or artificially sweetened beverages)?: 0  How many minutes a day do you exercise enough to make your heart beat faster?: 9 or less  How many days a week do you exercise enough to make your heart beat faster?: 3 or less  How many days per week do you miss taking your medication?: 0  What is the reason for your visit today?: Check up  When did your symptoms begin?: Today  What are your symptoms?: No  How would you describe these symptoms?: Mild  Have you had these symptoms before?: No  Is there anything that makes you feel worse?: No  Is there anything that makes you feel better?: Yes

## 2022-07-14 NOTE — TELEPHONE ENCOUNTER
This medication was rerouted back to sending nurse from provider.  It appears that the prescription needs to be changed to a 7 day dose of miconazole to be covered by insurance.  Provider will need to do the med change request.  RN unable to do this.

## 2022-07-14 NOTE — TELEPHONE ENCOUNTER
Pending Prescriptions:                       Disp   Refills    miconazole (MONISTAT 1 DAY OR NIGHT) 1200 *1 kit  1        Sig: USE AS DIRECTED.    Routing refill request to provider for review/approval because:  Medication is reported/historical

## 2022-07-22 ENCOUNTER — OFFICE VISIT (OUTPATIENT)
Dept: FAMILY MEDICINE | Facility: CLINIC | Age: 42
End: 2022-07-22
Payer: COMMERCIAL

## 2022-07-22 VITALS
RESPIRATION RATE: 18 BRPM | BODY MASS INDEX: 36.88 KG/M2 | OXYGEN SATURATION: 99 % | WEIGHT: 216 LBS | TEMPERATURE: 97 F | DIASTOLIC BLOOD PRESSURE: 76 MMHG | SYSTOLIC BLOOD PRESSURE: 124 MMHG | HEIGHT: 64 IN | HEART RATE: 82 BPM

## 2022-07-22 DIAGNOSIS — M54.42 ACUTE BILATERAL LOW BACK PAIN WITH LEFT-SIDED SCIATICA: Primary | ICD-10-CM

## 2022-07-22 DIAGNOSIS — R10.2 PELVIC AND PERINEAL PAIN: ICD-10-CM

## 2022-07-22 PROBLEM — E66.9 OBESITY: Status: RESOLVED | Noted: 2019-02-01 | Resolved: 2022-07-22

## 2022-07-22 PROBLEM — J45.909 ASTHMA: Status: RESOLVED | Noted: 2022-06-16 | Resolved: 2022-07-22

## 2022-07-22 PROCEDURE — 99214 OFFICE O/P EST MOD 30 MIN: CPT | Performed by: FAMILY MEDICINE

## 2022-07-22 ASSESSMENT — PAIN SCALES - GENERAL: PAINLEVEL: SEVERE PAIN (7)

## 2022-08-15 ENCOUNTER — THERAPY VISIT (OUTPATIENT)
Dept: PHYSICAL THERAPY | Facility: CLINIC | Age: 42
End: 2022-08-15
Payer: COMMERCIAL

## 2022-08-15 DIAGNOSIS — M54.17 LUMBOSACRAL RADICULOPATHY: ICD-10-CM

## 2022-08-15 PROCEDURE — 97110 THERAPEUTIC EXERCISES: CPT | Mod: GP | Performed by: PHYSICAL THERAPIST

## 2022-08-15 PROCEDURE — 97530 THERAPEUTIC ACTIVITIES: CPT | Mod: GP | Performed by: PHYSICAL THERAPIST

## 2022-08-15 NOTE — PROGRESS NOTES
[facility] Initial Evaluation -- Lumbar    Date: August 15, 2022  Tanya Denny is a 42 year old female with a low back condition.   Referral: Dr. Floyd  Work mechanical stresses:    Employment status:  Part time  Leisure mechanical stresses: play with children (8 children - 21 yrs down to 14 mo)  Functional disability score (CASSANDRA/STarT Back):  31.11/Medium (5)  VAS score (0-10): 6-7/10  Patient goals/expectations:  Walk without back pain (she likes to walk at least 1 mile).      HISTORY:    Present symptoms: tailbone down B LEs to posterior ankles, L > R   Pain quality (sharp/shooting/stabbing/aching/burning/cramping):  Deep ache   Paresthesia (yes/no):  no    Present since (onset date): April 2021.     Symptoms (improving/unchanging/worsening):  unchanging.     Symptoms commenced as a result of: delivery of her 8th delivery (difficulty delivery of placenta)   Condition occurred in the following environment:   hospital     Symptoms at onset (back/thigh/leg): felt symptoms in her legs  Constant symptoms (back/thigh/leg): tailbone and LEs  Intermittent symptoms (back/thigh/leg):     Symptoms are made worse with the following: Always Sitting, Always Rising, Always Standing, Time of day - No effect, Sometimes When still and Other - Lifting and playing with her children   Symptoms are made better with the following: Sometimes Walking, Sometimes On the move and Other - acupuncture (no lasting relief), cupping (2 days relief), pressups when feels severe pain    Disturbed sleep (yes/no):  no Sleeping postures (prone/sup/side R/L): prone    Year of first episode: April 2001    Previous history: back and LE symptoms  Previous treatments: PT in past has helped      Specific Questions:  Cough/Sneeze/Strain (pos/neg): negative  Bowel/Bladder (normal/abnormal): normal  Gait (normal/abnormal): slowed  Medications (nil/NSAIDS/analg/steroids/anticoag/other):  NSAIDS and Other -  vitamins  Medical allergies:  Pork gelatin  General health (excellent/good/fair/poor):  good  Pertinent medical history:  Overweight  Imaging (None/Xray/MRI/Other):    Not recent; 2020 - Presumed transitional lumbosacral anatomy with lumbarization of S1 vertebral body.   No acute fracture or subluxation of the visualized thoracic spine. No substantial degenerative changes of the thoracic spine with a few tiny anterior osteophytes.   No acute fracture or dislocation of the lumbar spine. No substantial degenerative changes.   No substantial coronal curvature of the spine.   Visualized lungs are clear. Visualized bowel gas pattern is nonobstructive. No acute osseous abnormality. SI joints are normal.   Recent or major surgery (yes/no):  Birth of daughter 2021  Night pain (yes/no): no  Accidents (yes/no): no  Unexplained weight loss (yes/no): no  Barriers at home: no-just pushes through to take care of her children  Other red flags: no    EXAMINATION    Posture:   Sitting (good/fair/poor): poor  Standing (good/fair/poor):fair  Lordosis (red/acc/normal): normal  Correction of posture (better/worse/no effect): better    Lateral Shift (right/left/nil): nil  Relevant (yes/no):  no  Other Observations: none    Neurological:    Motor deficit:  Strong and without pain for B LEs  Reflexes:  Symmetrical and brisk B LE  Sensory deficit:  Symmetrical to light touch  Dural signs:  Inc symptoms on L near end range seated SLR    Movement Loss:   Anastacio Mod Min Nil Pain   Flexion    x Pulling LBP/LEs   Extension  x x  Pain across the low back   Side Gliding R   x x Tailbone at end range   Side Gliding L    x Tailbone at end range     Test Movements:   During: produces, abolishes, increases, decreases, no effect, centralizing, peripheralizing   After: better, worse, no better, no worse, no effect, centralized, peripheralized    Pretest symptoms standin-7/10 tailbone through posterior LEs to heels   Symptoms During Symptoms  After ROM increased ROM decreased No Effect   FIS        Rep FIS        EIS Increases    No Worse         Rep EIS Increases    No Worse      x     Pretest symptoms lyin-7/10  tailbone through posterior LEs to heels   Symptoms During Symptoms After ROM increased ROM decreased No Effect   NELSON        Rep NELSON        EIL Increases    No Worse         Rep EIL Increases    Better    Inc EIS ROM and dec tension with seated SLR       If required, pretest symptoms: not tested   Symptoms During Symptoms After ROM increased ROM decreased No Effect   SGIS - R        Rep SGIS - R        SGIS - L        Rep SGIS - L          Static Tests:  Sitting slouched:  Not assessed      Sitting erect:  Sitting with lumbar roll felt good  Standing slouched: not assessed      Standing erect:  Not assessed  Lying prone in extension:  Decreased back and abolished LE sym  Long sitting:  Not assessed.     Other Tests: tender to palpation over sacrum, tailbone, PSIS and lower back    Provisional Classification:  Derangement - Asymmetrical, unilateral, symptoms below knee    Principle of Management:  Education:  Discussed trial of sitting on a rolled towel in the shape of a U to suspend her tailbone to determine impact on sitting tolerance.  Can use rolled towel to support low back.  Avoid slouched sitting, prolonged and/or repetitive bending forward.  Will assess extension as decreased pain, improved LROM and decreased tension on the L with seated SLR.       Equipment provided:  none  Mechanical therapy (Y/N):  yes   Extension principle:  Prone lying in sustained extension 3 minutes followed by pressups 10 reps, goal is 6-8 times a day    Lateral Principle:    Flexion principle:      Other:  Sit on towel in shape of U to suspend tailbone when sitting.     ASSESSMENT/PLAN:    Patient is a 42 year old female with lumbar complaints.    Patient has the following significant findings with corresponding treatment plan.                Diagnosis 1:   Lumbosacral radiculopathy    Pain -  manual therapy, self management, education, directional preference exercise and home program  Decreased ROM/flexibility - manual therapy, therapeutic exercise and home program  Decreased strength - therapeutic exercise, therapeutic activities and home program  Impaired muscle performance - neuro re-education and home program  Decreased function - therapeutic activities and home program  Impaired posture - neuro re-education, therapeutic activities and home program    Therapy Evaluation Codes:   1) History comprised of:   Personal factors that impact the plan of care:      Time since onset of symptoms.    Comorbidity factors that impact the plan of care are:      Numbness/tingling and Overweight.     Medications impacting care: None.  2) Examination of Body Systems comprised of:   Body structures and functions that impact the plan of care:      Lumbar spine, Pelvis and Sacral illiac joint.   Activity limitations that impact the plan of care are:      Bending, Driving, Dressing, Lifting, Sitting and Standing.  3) Clinical presentation characteristics are:   Stable/Uncomplicated.  4) Decision-Making    Low complexity using standardized patient assessment instrument and/or measureable assessment of functional outcome.  Cumulative Therapy Evaluation is: Low complexity.    Previous and current functional limitations:  (See Goal Flow Sheet for this information)    Short term and Long term goals: (See Goal Flow Sheet for this information)     Communication ability:  Patient appears to be able to clearly communicate and understand verbal and written communication and follow directions correctly.  Treatment Explanation - The following has been discussed with the patient:   RX ordered/plan of care  Anticipated outcomes  Possible risks and side effects  This patient would benefit from PT intervention to resume normal activities.   Rehab potential is good.    Frequency:  1 X week, once  daily  Duration:  for 8 weeks  Discharge Plan:  Achieve all LTG.  Independent in home treatment program.  Reach maximal therapeutic benefit.    Please refer to the daily flowsheet for treatment today, total treatment time and time spent performing 1:1 timed codes.

## 2022-08-16 NOTE — PROGRESS NOTES
[facility]    OUTPATIENT Physical Therapy ORTHOPEDIC EVALUATION  PLAN OF TREATMENT FOR OUTPATIENT REHABILITATION  (COMPLETE FOR INITIAL CLAIMS ONLY)  Patient's Last Name, First Name, M.I.  YOB: 1980  Tanya Denny    Provider s Name:  [facility]   Medical Record No.  [id number]   Start of Care Date:  08/15/22   Onset Date:    (April 2021)   Type:     _X__PT   ___OT Medical Diagnosis:    Encounter Diagnosis   Name Primary?    Lumbosacral radiculopathy         Treatment Diagnosis:  low back pain with B LE symptoms        Goals:     08/15/22 0500   Body Part   Goals listed below are for low back   Goal #1   Goal #1 standing   Previous Functional Level No restrictions   Current Functional Level Minutes patient can stand   Performance level severe pain within a minutes, but will push to 10' with swaying back/forth to keep moving   STG Target Performance Minutes patient will be able to stand   Performance level 10 minutes without increased pain   Rationale for housekeeping tasks such as vacuuming, bed making, mowing, gardening;for personal hygiene;for meal preparation;for safe household ambulation   Due date 09/05/22   LTG Target Performance Minutes patient will be able to stand   Performance Level 30 minutes without back/tailbone or LE symptoms   Rationale for housekeeping tasks such as vacuuming, bed making, mowing;for personal hygiene;for meal preparation;for safe household ambulation   Due date 10/14/22         Therapy Frequency:  1 time a week  Predicted Duration of Therapy Intervention:  8 weeks    Aissatou Negrete, PT                 I CERTIFY THE NEED FOR THESE SERVICES FURNISHED UNDER        THIS PLAN OF TREATMENT AND WHILE UNDER MY CARE     (Physician co-signature of this document indicates review and certification of the therapy plan).                      Certification Date From:  08/15/22   Certification Date To:  10/14/22    Referring Provider:  Mele Floyd    Initial Assessment        See Epic Evaluation SOC Date: 08/15/22

## 2022-08-23 ENCOUNTER — THERAPY VISIT (OUTPATIENT)
Dept: PHYSICAL THERAPY | Facility: CLINIC | Age: 42
End: 2022-08-23
Payer: COMMERCIAL

## 2022-08-23 DIAGNOSIS — M54.17 LUMBOSACRAL RADICULOPATHY: Primary | ICD-10-CM

## 2022-08-23 PROCEDURE — 97110 THERAPEUTIC EXERCISES: CPT | Mod: CQ | Performed by: PHYSICAL THERAPY ASSISTANT

## 2022-08-30 ENCOUNTER — THERAPY VISIT (OUTPATIENT)
Dept: PHYSICAL THERAPY | Facility: CLINIC | Age: 42
End: 2022-08-30
Payer: COMMERCIAL

## 2022-08-30 DIAGNOSIS — M54.17 LUMBOSACRAL RADICULOPATHY: Primary | ICD-10-CM

## 2022-08-30 PROCEDURE — 97110 THERAPEUTIC EXERCISES: CPT | Mod: CQ | Performed by: PHYSICAL THERAPY ASSISTANT

## 2022-08-31 DIAGNOSIS — K21.9 GASTROESOPHAGEAL REFLUX DISEASE WITHOUT ESOPHAGITIS: Primary | ICD-10-CM

## 2022-08-31 RX ORDER — NICOTINE POLACRILEX 4 MG/1
20 GUM, CHEWING ORAL DAILY
Qty: 90 TABLET | Refills: 3 | Status: SHIPPED | OUTPATIENT
Start: 2022-08-31 | End: 2023-01-06

## 2022-10-03 ENCOUNTER — OFFICE VISIT (OUTPATIENT)
Dept: PEDIATRICS | Facility: CLINIC | Age: 42
End: 2022-10-03
Payer: COMMERCIAL

## 2022-10-03 ENCOUNTER — ANCILLARY PROCEDURE (OUTPATIENT)
Dept: CT IMAGING | Facility: CLINIC | Age: 42
End: 2022-10-03
Attending: STUDENT IN AN ORGANIZED HEALTH CARE EDUCATION/TRAINING PROGRAM
Payer: COMMERCIAL

## 2022-10-03 ENCOUNTER — TELEPHONE (OUTPATIENT)
Dept: OBGYN | Facility: CLINIC | Age: 42
End: 2022-10-03

## 2022-10-03 ENCOUNTER — NURSE TRIAGE (OUTPATIENT)
Dept: FAMILY MEDICINE | Facility: CLINIC | Age: 42
End: 2022-10-03

## 2022-10-03 ENCOUNTER — ANCILLARY PROCEDURE (OUTPATIENT)
Dept: ULTRASOUND IMAGING | Facility: CLINIC | Age: 42
End: 2022-10-03
Attending: STUDENT IN AN ORGANIZED HEALTH CARE EDUCATION/TRAINING PROGRAM
Payer: COMMERCIAL

## 2022-10-03 VITALS
SYSTOLIC BLOOD PRESSURE: 130 MMHG | RESPIRATION RATE: 18 BRPM | OXYGEN SATURATION: 100 % | TEMPERATURE: 98.9 F | DIASTOLIC BLOOD PRESSURE: 90 MMHG | HEART RATE: 72 BPM

## 2022-10-03 DIAGNOSIS — R10.2 PELVIC PAIN IN FEMALE: ICD-10-CM

## 2022-10-03 DIAGNOSIS — N83.201 HEMORRHAGIC CYST OF RIGHT OVARY: Primary | ICD-10-CM

## 2022-10-03 DIAGNOSIS — R10.30 LOWER ABDOMINAL PAIN: ICD-10-CM

## 2022-10-03 LAB
ALBUMIN SERPL-MCNC: 3.7 G/DL (ref 3.4–5)
ALBUMIN UR-MCNC: NEGATIVE MG/DL
ALP SERPL-CCNC: 67 U/L (ref 40–150)
ALT SERPL W P-5'-P-CCNC: 22 U/L (ref 0–50)
ANION GAP SERPL CALCULATED.3IONS-SCNC: 4 MMOL/L (ref 3–14)
APPEARANCE UR: CLEAR
AST SERPL W P-5'-P-CCNC: 13 U/L (ref 0–45)
BILIRUB SERPL-MCNC: 0.3 MG/DL (ref 0.2–1.3)
BILIRUB UR QL STRIP: NEGATIVE
BUN SERPL-MCNC: 9 MG/DL (ref 7–30)
CALCIUM SERPL-MCNC: 9.2 MG/DL (ref 8.5–10.1)
CHLORIDE BLD-SCNC: 106 MMOL/L (ref 94–109)
CLUE CELLS: ABNORMAL
CO2 SERPL-SCNC: 28 MMOL/L (ref 20–32)
COLOR UR AUTO: COLORLESS
CREAT SERPL-MCNC: 0.68 MG/DL (ref 0.52–1.04)
ERYTHROCYTE [DISTWIDTH] IN BLOOD BY AUTOMATED COUNT: 12.7 % (ref 10–15)
GFR SERPL CREATININE-BSD FRML MDRD: >90 ML/MIN/1.73M2
GLUCOSE BLD-MCNC: 92 MG/DL (ref 70–99)
GLUCOSE UR STRIP-MCNC: NEGATIVE MG/DL
HCG UR QL: NEGATIVE
HCT VFR BLD AUTO: 33.4 % (ref 35–47)
HGB BLD-MCNC: 11.2 G/DL (ref 11.7–15.7)
HGB UR QL STRIP: ABNORMAL
KETONES UR STRIP-MCNC: NEGATIVE MG/DL
LEUKOCYTE ESTERASE UR QL STRIP: NEGATIVE
MCH RBC QN AUTO: 26.5 PG (ref 26.5–33)
MCHC RBC AUTO-ENTMCNC: 33.5 G/DL (ref 31.5–36.5)
MCV RBC AUTO: 79 FL (ref 78–100)
NITRATE UR QL: NEGATIVE
PH UR STRIP: 7 [PH] (ref 5–7)
PLATELET # BLD AUTO: 231 10E3/UL (ref 150–450)
POTASSIUM BLD-SCNC: 3.6 MMOL/L (ref 3.4–5.3)
PROT SERPL-MCNC: 7.8 G/DL (ref 6.8–8.8)
RADIOLOGIST FLAGS: NORMAL
RBC # BLD AUTO: 4.22 10E6/UL (ref 3.8–5.2)
RBC #/AREA URNS AUTO: ABNORMAL /HPF
SKIP: ABNORMAL
SODIUM SERPL-SCNC: 138 MMOL/L (ref 133–144)
SP GR UR STRIP: 1.01 (ref 1–1.03)
SQUAMOUS #/AREA URNS AUTO: ABNORMAL /LPF
TRICHOMONAS, WET PREP: ABNORMAL
UROBILINOGEN UR STRIP-MCNC: NORMAL MG/DL
WBC # BLD AUTO: 9.2 10E3/UL (ref 4–11)
WBC #/AREA URNS AUTO: ABNORMAL /HPF
WBC'S/HIGH POWER FIELD, WET PREP: ABNORMAL
YEAST, WET PREP: ABNORMAL

## 2022-10-03 PROCEDURE — 87210 SMEAR WET MOUNT SALINE/INK: CPT | Performed by: STUDENT IN AN ORGANIZED HEALTH CARE EDUCATION/TRAINING PROGRAM

## 2022-10-03 PROCEDURE — 80053 COMPREHEN METABOLIC PANEL: CPT | Performed by: STUDENT IN AN ORGANIZED HEALTH CARE EDUCATION/TRAINING PROGRAM

## 2022-10-03 PROCEDURE — 74177 CT ABD & PELVIS W/CONTRAST: CPT | Mod: GC | Performed by: RADIOLOGY

## 2022-10-03 PROCEDURE — 81025 URINE PREGNANCY TEST: CPT | Performed by: STUDENT IN AN ORGANIZED HEALTH CARE EDUCATION/TRAINING PROGRAM

## 2022-10-03 PROCEDURE — 81001 URINALYSIS AUTO W/SCOPE: CPT | Performed by: STUDENT IN AN ORGANIZED HEALTH CARE EDUCATION/TRAINING PROGRAM

## 2022-10-03 PROCEDURE — 85027 COMPLETE CBC AUTOMATED: CPT | Performed by: STUDENT IN AN ORGANIZED HEALTH CARE EDUCATION/TRAINING PROGRAM

## 2022-10-03 PROCEDURE — 76856 US EXAM PELVIC COMPLETE: CPT | Performed by: RADIOLOGY

## 2022-10-03 PROCEDURE — 99215 OFFICE O/P EST HI 40 MIN: CPT | Performed by: STUDENT IN AN ORGANIZED HEALTH CARE EDUCATION/TRAINING PROGRAM

## 2022-10-03 PROCEDURE — 36415 COLL VENOUS BLD VENIPUNCTURE: CPT | Performed by: STUDENT IN AN ORGANIZED HEALTH CARE EDUCATION/TRAINING PROGRAM

## 2022-10-03 PROCEDURE — 76830 TRANSVAGINAL US NON-OB: CPT | Performed by: RADIOLOGY

## 2022-10-03 RX ORDER — IOPAMIDOL 755 MG/ML
132 INJECTION, SOLUTION INTRAVASCULAR ONCE
Status: COMPLETED | OUTPATIENT
Start: 2022-10-03 | End: 2022-10-03

## 2022-10-03 RX ADMIN — IOPAMIDOL 132 ML: 755 INJECTION, SOLUTION INTRAVASCULAR at 17:38

## 2022-10-03 NOTE — TELEPHONE ENCOUNTER
Called and spoke with patient.     She is willing to go to [facility].    PCP to do provider to provider call.     Gloria HARTLEYN, RN

## 2022-10-03 NOTE — TELEPHONE ENCOUNTER
Received a call from the call center with the patient on the other line stating that she has had abdominal pain since Friday and now it is at a 10.    Patient has not been seen in the office for over 3 years and instructed the patient to go to the ER to be evaluated.

## 2022-10-03 NOTE — TELEPHONE ENCOUNTER
Provider Response to 2nd Level Triage Request    I have reviewed the RN documentation. My recommendation is:  Refer to Acute and Diagnostic Services ([facility]) clinic.     Referral to Acute and Diagnostic Services        Day of Diagnosis services are indicated.  The [facility]ostics [facility] Clinic has been contacted at [phone] ([place]) to confirm patient acceptance.     The transition to Acute & Diagnostic Services Clinic has been discussed with patient, and she agrees with next level of care.  Patient understands that evaluation/treatment at [facility] typically takes significantly longer than in clinic/urgent care (>2 hours).    [facility] Referral placed: Yes  Patient has transportation arranged and will travel to the [facility] without delay: No    Patient aware not to eat or drink. Unsure

## 2022-10-03 NOTE — PROGRESS NOTES
Assessment & Plan     Hemorrhagic cyst of right ovary  Patient presents with pelvic/lower abdominal pain without changes in bowel or urinary habits. Periods have been normal. Wet prep and UA are normal. Pregnancy test is negative. I recommended starting with pelvic ultrasound to evaluate the female organs and there is a hemorrhagic cyst in the right ovary however she is more tender over the LLQ so I am concerned I may be missing something such as diverticulitis that could only be picked up on a CT scan so I recommended proceeding with CT to further evaluate. The CT showed some radiopaque foreign bodies within the colon which unclear in significance. Patient denies ingestion of vitamins or pills or other substances that would appear radiopaque. The CT shows normal appendix and no sign of diverticulitis. The cyst in the right ovary was again seen. I advised that the pain is likely from the ovarian cyst and that if the cyst were to rupture that she may have intense pain that then resolves after the initial sharp pain. If the pain is persisting, worsening or she develops fever, chills, nausea, vomiting or any other new symptoms to be seen in ER or urgent care. Patient agrees with plan of care.     Pelvic pain in female  - US Pelvic Complete with Transvaginal  - CT Abdomen Pelvis w Contrast    Lower abdominal pain  - IV access  - CBC with platelets  - Comprehensive metabolic panel  - UA macro with reflex to Microscopic and Culture - Clinc Collect  - Wet prep - Clinic Collect  - HCG Qual, Urine ([id number])  - Comprehensive metabolic panel  - CBC with platelets  - Urine Microscopic  - CT Abdomen Pelvis w Contrast     50 minutes spent on the date of the encounter doing chart review, interpretation of tests, patient visit and documentation       No follow-ups on file.    Brittany Cuellar, [facility]    Ange Martínez is a 42 year old female who presents to clinic today for the  following health issues:  Chief Complaint   Patient presents with     Abdominal Pain     HPI  LMP 9/16  Patient reports sharp lower abdominal pain constant x 3 days. When it is at its worst rates it 9/10. Today she is rating the pain from 6-7/10 to 10/10.  She denies fever, nausea, vomiting, black or bloody stools, constipation, diarrhea, urinary frequency, dysuria, blood in urine. Last BM this morning, has 3 soft stools per day.  LMP 9/16/22. No history of appendectomy or diverticulitis.     Patient Active Problem List   Diagnosis     Dyspepsia     Lumbago     Chronic mixed headache syndrome     Uterine leiomyoma, unspecified location     AMA (advanced maternal age) multigravida 35+     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     Somatic dysfunction of lumbar region     Endometritis     GERD (gastroesophageal reflux disease)     Multigravida of advanced maternal age in third trimester     Morbid obesity (H)     Lumbosacral radiculopathy     Current Outpatient Medications   Medication     Cholecalciferol (VITAMIN D PO)     clotrimazole (LOTRIMIN) 1 % external cream     ferrous sulfate (FEROSUL) 325 (65 Fe) MG tablet     ibuprofen (ADVIL/MOTRIN) 600 MG tablet     omeprazole 20 MG tablet     Prenatal Vit-Iron Carbonyl-FA (PRENATABS RX) 29-1 MG TABS     No current facility-administered medications for this visit.         Review of Systems  Constitutional, HEENT, cardiovascular, pulmonary, GI, , musculoskeletal, neuro, skin, endocrine and psych systems are negative, except as otherwise noted.      Objective    BP (!) 130/90 (BP Location: Left arm, Patient Position: Sitting, Cuff Size: Adult Regular)   Pulse 72   Temp 98.9  F (37.2  C) (Oral)   Resp 18   SpO2 100%   Physical Exam   GENERAL: healthy, alert and no distress  RESP: lungs clear to auscultation - no rales, rhonchi or wheezes  CV: regular rate and rhythm, normal S1 S2, no S3 or S4, no murmur, click or rub, no peripheral edema   ABDOMEN: tenderness LLQ and lower midline, mild  tender to palpation RLQ, no rebound tenderness and no organomegaly or masses  MS: no gross musculoskeletal defects noted, no edema  SKIN: no suspicious lesions or rashes  NEURO: Normal strength and tone, mentation intact and speech normal  PSYCH: mentation appears normal, affect normal/bright    Results for orders placed or performed in visit on 10/03/22   US Pelvic Complete with Transvaginal     Status: None    Narrative    EXAMINATION: US PELVIC TRANSABDOMINAL AND TRANSVAGINAL, 10/3/2022 3:30  PM     COMPARISON: 6/20/2022    HISTORY: Midline pelvic abdominal pain for 3 days    TECHNIQUE: The pelvis was scanned in standard fashion with  transabdominal and transvaginal transducer(s) using both grey scale  and color Doppler techniques.    FINDINGS:  The uterus measures 8.1 x 4.3 x 6.2 cm, and there is no evidence of a  focal fibroid.  The endometrium is within normal limits and measures 9  mm. There is no free fluid in the pelvis.    The right ovary measures 4.5 x 3.5 x 3.9 and the left ovary measures  3.3 x 2.1 x 2.8. The right ovary contains a 3.5 x 3.3 x 2.5 cm  hemorrhagic cyst. Trace free fluid within the pelvis. There is normal  blood flow to the ovaries.        Impression    IMPRESSION:   1.  3.5 cm hemorrhagic cyst in the right ovary. Follow up ultrasound  6-8 weeks to ensure resolution.  2.  Trace free fluid within the pelvis.     I have personally reviewed the examination and initial interpretation  and I agree with the findings.    COLBY GRIMES MD         SYSTEM ID:  [id number]   CT Abdomen Pelvis w Contrast     Status: None   Result Value Ref Range    Radiologist flags Ovarian cyst     Narrative    Examination: CT ABDOMEN PELVIS W CONTRAST, 10/3/2022 5:41 PM    Technique:  Helical CT images from the lung bases through the  symphysis pubis were obtained with contrast.  Coronal reformatted  images were generated at a workstation for further assessment.    Contrast:  132 mL Isovue-370 IV    Comparison: Same  day pelvic ultrasound, abdomen and pelvis CT  8/31/2016    History: midline abdominal pain, US shows right ovarian cyst but pt  has no pain with palpation on RLQ, r/o other etiology for pain; Pelvic  pain in female; Lower abdominal pain    Findings:    Abdomen and pelvis:   Liver: No suspicious liver lesions. Focal fatty change along the  falciform ligament.  Gallbladder: No gallstones. No evidence of acute cholecystitis.  Spleen: Normal size. Multiple perisplenic varices.  Pancreas: No suspicious pancreatic lesions. The pancreatic duct is not  dilated.  Adrenal glands: No adrenal nodules.  Urinary system: No kidney masses. No hydronephrosis, hydroureter, or  obstructing renal stones. Urinary bladder is unremarkable.  Reproductive organs: 3.4 x 3.8 cm cystic lesion in the right adnexa  with layering hyperdensity consistent with the hemorrhagic right  ovarian cyst. The uterus and left ovary are within normal limits.  Bowel: Multiple retained radiopaque foreign bodies within the cecum,  ascending colon at the hepatic flexure and proximal transverse colon,  likely undigested pills. Normal caliber large and small bowel. No  abnormal bowel wall thickening or enhancement. No pneumatosis, portal  venous gas, or intra-abdominal free air. The appendix in the right  lower quadrant is within normal limits.  Lymph nodes: No retroperitoneal, mesenteric, or pelvic  lymphadenopathy.  Fluid: Trace, likely physiologic pelvic free fluid.  Vessels: No infrarenal aortic aneurysm. The portal, superior  mesenteric, and splenic veins are patent.    Lung bases: No consolidation or pleural effusion.    Bones and soft tissues: No suspicious osseous lesions.      Impression    Impression:   1.  Multiple radiopaque foreign bodies within the colon which are of  uncertain clinical significance, most likely undigested pills. No  evidence for obstruction or perforation.  2.  Normal-appearing appendix.  3.  Right ovarian hemorrhagic cyst measuring  approximately 3.8 cm.  Recommend follow-up pelvic ultrasound in 6-8 weeks to ensure  resolution given pelvic pain.     [Consider Follow Up: Ovarian cyst]    This report will be copied to the [facility] to ensure a  provider acknowledges the finding. Access Center is available Monday  through Friday 8am-3:30 pm.       I have personally reviewed the examination and initial interpretation  and I agree with the findings.    ABRAM BLUM MD         SYSTEM ID:  [id number]   UA macro with reflex to Microscopic and Culture - Clinc Collect     Status: Abnormal    Specimen: Urine, NOS   Result Value Ref Range    Color Urine Colorless Colorless, Straw, Light Yellow, Yellow    Appearance Urine Clear Clear    Glucose Urine Negative Negative mg/dL    Bilirubin Urine Negative Negative    Ketones Urine Negative Negative mg/dL    Specific Gravity Urine 1.007 0.999 - 1.035    Blood Urine Small (A) Negative    pH Urine 7.0 5.0 - 7.0    Protein Albumin Urine Negative Negative mg/dL    Urobilinogen Urine Normal Normal, 2.0 mg/dL    Nitrite Urine Negative Negative    Leukocyte Esterase Urine Negative Negative    SKIP     HCG Qual, Urine ([id number])     Status: Normal   Result Value Ref Range    hCG Urine Qualitative Negative Negative   Comprehensive metabolic panel     Status: Normal   Result Value Ref Range    Sodium 138 133 - 144 mmol/L    Potassium 3.6 3.4 - 5.3 mmol/L    Chloride 106 94 - 109 mmol/L    Carbon Dioxide (CO2) 28 20 - 32 mmol/L    Anion Gap 4 3 - 14 mmol/L    Urea Nitrogen 9 7 - 30 mg/dL    Creatinine 0.68 0.52 - 1.04 mg/dL    Calcium 9.2 8.5 - 10.1 mg/dL    Glucose 92 70 - 99 mg/dL    Alkaline Phosphatase 67 40 - 150 U/L    AST 13 0 - 45 U/L    ALT 22 0 - 50 U/L    Protein Total 7.8 6.8 - 8.8 g/dL    Albumin 3.7 3.4 - 5.0 g/dL    Bilirubin Total 0.3 0.2 - 1.3 mg/dL    GFR Estimate >90 >60 mL/min/1.73m2   CBC with platelets     Status: Abnormal   Result Value Ref Range    WBC Count 9.2 4.0 - 11.0 10e3/uL     RBC Count 4.22 3.80 - 5.20 10e6/uL    Hemoglobin 11.2 (L) 11.7 - 15.7 g/dL    Hematocrit 33.4 (L) 35.0 - 47.0 %    MCV 79 78 - 100 fL    MCH 26.5 26.5 - 33.0 pg    MCHC 33.5 31.5 - 36.5 g/dL    RDW 12.7 10.0 - 15.0 %    Platelet Count 231 150 - 450 10e3/uL   Urine Microscopic     Status: Abnormal   Result Value Ref Range    RBC Urine 2-5 (A) 0-2 /HPF /HPF    WBC Urine 0-5 0-5 /HPF /HPF    Squamous Epithelials Urine Few (A) None Seen /LPF    Narrative    Urine Culture not indicated   Wet prep - Clinic Collect     Status: Abnormal    Specimen: Vagina; Swab   Result Value Ref Range    Trichomonas Absent Absent    Yeast Absent Absent    Clue Cells Absent Absent    WBCs/high power field 1+ (A) None

## 2022-10-03 NOTE — PROGRESS NOTES
{PROVIDER CHARTING PREFERENCE:244698}    Ange Martínez is a 42 year old presenting for the following health issues:  Abdominal Pain      HPI Patient states that she developed lower mid abd pain for the past 3 days . She states that this pain is sharp and constant. Denies N/V/D and further symptoms. No UTI SX     {additonal problems for provider to add (Optional):272959}    Review of Systems   {ROS COMP (Optional):537008}      Objective    BP (!) 134/90 (BP Location: Left arm, Patient Position: Sitting, Cuff Size: Adult Regular)   Pulse 80   Temp 98.9  F (37.2  C) (Oral)   Resp 18   SpO2 100%   There is no height or weight on file to calculate BMI.  Physical Exam   {Exam List (Optional):395525}    {Diagnostic Test Results (Optional):884249}    {AMBULATORY ATTESTATION (Optional):228860}

## 2022-10-03 NOTE — TELEPHONE ENCOUNTER
S-(situation): abdominal pain    B-(background): Woke up Fri. 9/30/22 with lower abdominal pain, sudden onset.  That was the worst day.     A-(assessment): Sharp lower abdominal pain constant x3 days.  Was at its worst 9/30, rates 10 out of 10 that day.  Today 6-7 out of 10.  She denies fever, nausea, vomiting, black or bloody stools, constipation, diarrhea, urinary frequency, dysuria, blood in urine. Last BM this morning, has 3 soft stools per day.  LMP 9/16/22.    R-(recommendations): Go to ED/UC now or office with provider approval.  She would prefer to be seen in clinic by a female provider, doesn't want to go and sit in UC/ER.  GIANLUCA Emery R.N.        Reason for Disposition    Constant abdominal pain lasting > 2 hours    Additional Information    Negative: SEVERE abdominal pain (e.g., excruciating)    Negative: Bloody, black, or tarry bowel movements  (Exception: Chronic-unchanged black-grey bowel movements and is taking iron pills or Pepto-Bismol.)    Negative: Vomiting red blood or black (coffee ground) material    Negative: Vomiting bile (green color)    Negative: Patient sounds very sick or weak to the triager    Negative: Passed out (i.e., fainted, collapsed and was not responding)    Negative: Shock suspected (e.g., cold/pale/clammy skin, too weak to stand, low BP, rapid pulse)    Negative: Sounds like a life-threatening emergency to the triager    Negative: Chest pain    Negative: Pain is mainly in upper abdomen (if needed ask: 'is it mainly above the belly button?')    Negative: Abdominal pain and pregnant < 20 weeks    Negative: Abdominal pain and pregnant 20 or more weeks    Negative: Vomiting and abdomen looks much more swollen than usual    Negative: White of the eyes have turned yellow (i.e., jaundice)    Negative: Blood in urine (red, pink, or tea-colored)    Negative: Fever > 103 F (39.4 C)    Negative: Fever > 101 F (38.3 C) and over 60 years of age    Negative: Fever > 100.0 F (37.8 C) and has  "diabetes mellitus or a weak immune system (e.g., HIV positive, cancer chemotherapy, organ transplant, splenectomy, chronic steroids)    Negative: Fever > 100.0 F (37.8 C) and bedridden (e.g., nursing home patient, stroke, chronic illness, recovering from surgery)    Negative: Pregnant or could be pregnant (i.e., missed last menstrual period)    Answer Assessment - Initial Assessment Questions  1. LOCATION: \"Where does it hurt?\"       Lower abdomen in the middle  2. RADIATION: \"Does the pain shoot anywhere else?\" (e.g., chest, back)      Constant pain,non radiating  3. ONSET: \"When did the pain begin?\" (e.g., minutes, hours or days ago)       Fri morning woke up with it.9/30  4. SUDDEN: \"Gradual or sudden onset?\"      sudden  5. PATTERN \"Does the pain come and go, or is it constant?\"     - If constant: \"Is it getting better, staying the same, or worsening?\"       (Note: Constant means the pain never goes away completely; most serious pain is constant and it progresses)      - If intermittent: \"How long does it last?\" \"Do you have pain now?\"      (Note: Intermittent means the pain goes away completely between bouts)      Pain is constant  6. SEVERITY: \"How bad is the pain?\"  (e.g., Scale 1-10; mild, moderate, or severe)    - MILD (1-3): doesn't interfere with normal activities, abdomen soft and not tender to touch     - MODERATE (4-7): interferes with normal activities or awakens from sleep, abdomen tender to touch     - SEVERE (8-10): excruciating pain, doubled over, unable to do any normal activities       Worse Fri. 9/30 10 out of 10, less severe each day since 6-7 out of 10  7. RECURRENT SYMPTOM: \"Have you ever had this type of stomach pain before?\" If Yes, ask: \"When was the last time?\" and \"What happened that time?\"       never  8. CAUSE: \"What do you think is causing the stomach pain?\"      Does not know  9. RELIEVING/AGGRAVATING FACTORS: \"What makes it better or worse?\" (e.g., movement, antacids, bowel " "movement)      Ibuprofen and Tylenol  10. OTHER SYMPTOMS: \"Do you have any other symptoms?\" (e.g., back pain, diarrhea, fever, urination pain, vomiting)        Denies all  11. PREGNANCY: \"Is there any chance you are pregnant?\" \"When was your last menstrual period?\"        LMP 9/16    Protocols used: ABDOMINAL PAIN - FEMALE-A-OH      "

## 2022-11-14 PROBLEM — M54.17 LUMBOSACRAL RADICULOPATHY: Status: RESOLVED | Noted: 2022-08-15 | Resolved: 2022-11-14

## 2022-11-14 NOTE — PROGRESS NOTES
Discharge Note    Progress reporting period is from initial evaluation date (please see noted date below) to Aug 30, 2022.  Linked Episodes   Type: Episode: Status: Noted: Resolved: Last update: Updated by:   PHYSICAL THERAPY low back pain 8/15/2022 Active 8/15/2022  8/30/2022  1:38 PM Aissatou Negrete, PT      Comments:       Tanya failed to follow up and current status is unknown.  Please see information below for last relevant information on current status.  Patient seen for 3 visits.    SUBJECTIVE  Subjective changes noted by patient:  Pt reports side glides for the lumbar spine exercise was helpful overall but pain comes and goes. [place] and increased low back and R leg pain. Swelling in foot is a little better. Current sym LB and L leg light pain down to L foot. HEP 2-3x day and is helpful when does it. If over does reps will get sore. Heels B are tender weightbearing and not weight bearing-was present before [place] but now more significant [place] the [place].  .  Current pain level is  (5-6/10 LB and lightly into L leg down to foot).     Previous pain level was  10/10.   Changes in function:  Yes (See Goal flowsheet attached for changes in current functional level)  Adverse reaction to treatment or activity: None    OBJECTIVE  Changes noted in objective findings: Pockets of swelling at lateral L ankle; LROM FIS nil loss with pull; EIS min/mod loss +; R SG min loss L SG ni loss     ASSESSMENT/PLAN  Diagnosis: low back pain with B LE symptoms   Updated problem list and treatment plan:   Pain - HEP  Decreased ROM/flexibility - HEP  Decreased function - HEP  STG/LTGs have been met or progress has been made towards goals:  Yes, please see goal flowsheet for most current information  Assessment of Progress: current status is unknown.    Last current status:     Self Management Plans:  HEP  I have re-evaluated this patient and find that the nature, scope, duration  and intensity of the therapy is appropriate for the medical condition of the patient.  Tanya continues to require the following intervention to meet STG and LTG's:  HEP.    Recommendations:  Discharge with current home program.  Patient to follow up with MD as needed.    Please refer to the daily flowsheet for treatment today, total treatment time and time spent performing 1:1 timed codes.

## 2022-11-20 ENCOUNTER — HEALTH MAINTENANCE LETTER (OUTPATIENT)
Age: 42
End: 2022-11-20

## 2023-01-06 ENCOUNTER — OFFICE VISIT (OUTPATIENT)
Dept: FAMILY MEDICINE | Facility: CLINIC | Age: 43
End: 2023-01-06
Payer: COMMERCIAL

## 2023-01-06 VITALS
OXYGEN SATURATION: 100 % | SYSTOLIC BLOOD PRESSURE: 120 MMHG | BODY MASS INDEX: 37.01 KG/M2 | TEMPERATURE: 97.7 F | HEIGHT: 64 IN | WEIGHT: 216.8 LBS | DIASTOLIC BLOOD PRESSURE: 88 MMHG | HEART RATE: 78 BPM

## 2023-01-06 DIAGNOSIS — Z13.228 SCREENING FOR ENDOCRINE, METABOLIC AND IMMUNITY DISORDER: ICD-10-CM

## 2023-01-06 DIAGNOSIS — N62 MACROMASTIA: ICD-10-CM

## 2023-01-06 DIAGNOSIS — Z13.220 LIPID SCREENING: ICD-10-CM

## 2023-01-06 DIAGNOSIS — Z13.0 SCREENING FOR ENDOCRINE, METABOLIC AND IMMUNITY DISORDER: ICD-10-CM

## 2023-01-06 DIAGNOSIS — E65 AXILLARY FAT PAD: ICD-10-CM

## 2023-01-06 DIAGNOSIS — Z00.00 ROUTINE GENERAL MEDICAL EXAMINATION AT A HEALTH CARE FACILITY: Primary | ICD-10-CM

## 2023-01-06 DIAGNOSIS — E66.01 MORBID OBESITY (H): ICD-10-CM

## 2023-01-06 DIAGNOSIS — R10.2 PELVIC PAIN IN FEMALE: ICD-10-CM

## 2023-01-06 DIAGNOSIS — Z79.899 ENCOUNTER FOR LONG-TERM (CURRENT) USE OF MEDICATIONS: ICD-10-CM

## 2023-01-06 DIAGNOSIS — Z13.29 SCREENING FOR ENDOCRINE, METABOLIC AND IMMUNITY DISORDER: ICD-10-CM

## 2023-01-06 DIAGNOSIS — Z12.31 ENCOUNTER FOR SCREENING MAMMOGRAM FOR BREAST CANCER: ICD-10-CM

## 2023-01-06 DIAGNOSIS — G44.209 TENSION HEADACHE: ICD-10-CM

## 2023-01-06 DIAGNOSIS — E61.1 IRON DEFICIENCY: ICD-10-CM

## 2023-01-06 DIAGNOSIS — K21.9 GASTROESOPHAGEAL REFLUX DISEASE WITHOUT ESOPHAGITIS: ICD-10-CM

## 2023-01-06 LAB
BASOPHILS # BLD AUTO: 0 10E3/UL (ref 0–0.2)
BASOPHILS NFR BLD AUTO: 0 %
CHOLEST SERPL-MCNC: 191 MG/DL
EOSINOPHIL # BLD AUTO: 0.1 10E3/UL (ref 0–0.7)
EOSINOPHIL NFR BLD AUTO: 1 %
ERYTHROCYTE [DISTWIDTH] IN BLOOD BY AUTOMATED COUNT: 13.3 % (ref 10–15)
FASTING STATUS PATIENT QL REPORTED: ABNORMAL
FERRITIN SERPL-MCNC: 10 NG/ML (ref 12–150)
HCT VFR BLD AUTO: 35.8 % (ref 35–47)
HDLC SERPL-MCNC: 57 MG/DL
HGB BLD-MCNC: 11.7 G/DL (ref 11.7–15.7)
IMM GRANULOCYTES # BLD: 0 10E3/UL
IMM GRANULOCYTES NFR BLD: 0 %
IRON SATN MFR SERPL: 10 % (ref 15–46)
IRON SERPL-MCNC: 39 UG/DL (ref 35–180)
LDLC SERPL CALC-MCNC: 120 MG/DL
LYMPHOCYTES # BLD AUTO: 1.6 10E3/UL (ref 0.8–5.3)
LYMPHOCYTES NFR BLD AUTO: 22 %
MCH RBC QN AUTO: 25.2 PG (ref 26.5–33)
MCHC RBC AUTO-ENTMCNC: 32.7 G/DL (ref 31.5–36.5)
MCV RBC AUTO: 77 FL (ref 78–100)
MONOCYTES # BLD AUTO: 0.5 10E3/UL (ref 0–1.3)
MONOCYTES NFR BLD AUTO: 7 %
NEUTROPHILS # BLD AUTO: 5.2 10E3/UL (ref 1.6–8.3)
NEUTROPHILS NFR BLD AUTO: 70 %
NONHDLC SERPL-MCNC: 134 MG/DL
PLATELET # BLD AUTO: 258 10E3/UL (ref 150–450)
RBC # BLD AUTO: 4.64 10E6/UL (ref 3.8–5.2)
TIBC SERPL-MCNC: 379 UG/DL (ref 240–430)
TRIGL SERPL-MCNC: 71 MG/DL
TSH SERPL DL<=0.005 MIU/L-ACNC: 0.78 MU/L (ref 0.4–4)
WBC # BLD AUTO: 7.4 10E3/UL (ref 4–11)

## 2023-01-06 PROCEDURE — 83540 ASSAY OF IRON: CPT | Performed by: FAMILY MEDICINE

## 2023-01-06 PROCEDURE — 85025 COMPLETE CBC W/AUTO DIFF WBC: CPT | Performed by: FAMILY MEDICINE

## 2023-01-06 PROCEDURE — 99213 OFFICE O/P EST LOW 20 MIN: CPT | Mod: 25 | Performed by: FAMILY MEDICINE

## 2023-01-06 PROCEDURE — 82728 ASSAY OF FERRITIN: CPT | Performed by: FAMILY MEDICINE

## 2023-01-06 PROCEDURE — 83550 IRON BINDING TEST: CPT | Performed by: FAMILY MEDICINE

## 2023-01-06 PROCEDURE — 36415 COLL VENOUS BLD VENIPUNCTURE: CPT | Performed by: FAMILY MEDICINE

## 2023-01-06 PROCEDURE — 84443 ASSAY THYROID STIM HORMONE: CPT | Performed by: FAMILY MEDICINE

## 2023-01-06 PROCEDURE — 80061 LIPID PANEL: CPT | Performed by: FAMILY MEDICINE

## 2023-01-06 PROCEDURE — 99396 PREV VISIT EST AGE 40-64: CPT | Performed by: FAMILY MEDICINE

## 2023-01-06 RX ORDER — NICOTINE POLACRILEX 4 MG/1
20 GUM, CHEWING ORAL DAILY
Qty: 90 TABLET | Refills: 3 | Status: SHIPPED | OUTPATIENT
Start: 2023-01-06 | End: 2024-01-09

## 2023-01-06 RX ORDER — IBUPROFEN 600 MG/1
600 TABLET, FILM COATED ORAL EVERY 6 HOURS PRN
Qty: 30 TABLET | Refills: 0 | Status: SHIPPED | OUTPATIENT
Start: 2023-01-06 | End: 2023-02-06

## 2023-01-06 RX ORDER — CYCLOBENZAPRINE HCL 10 MG
10 TABLET ORAL
Qty: 45 TABLET | Refills: 1 | Status: SHIPPED | OUTPATIENT
Start: 2023-01-06 | End: 2023-12-12

## 2023-01-06 ASSESSMENT — ENCOUNTER SYMPTOMS
PALPITATIONS: 0
NAUSEA: 0
MYALGIAS: 0
WEAKNESS: 0
COUGH: 0
ABDOMINAL PAIN: 0
PARESTHESIAS: 0
ARTHRALGIAS: 0
FEVER: 0
EYE PAIN: 0
FREQUENCY: 0
HEADACHES: 0
HEMATOCHEZIA: 0
DIZZINESS: 0
HEARTBURN: 0
DYSURIA: 0
CONSTIPATION: 0
JOINT SWELLING: 0
DIARRHEA: 0
SORE THROAT: 0
CHILLS: 0
SHORTNESS OF BREATH: 0
NERVOUS/ANXIOUS: 0
HEMATURIA: 0

## 2023-01-06 ASSESSMENT — PAIN SCALES - GENERAL: PAINLEVEL: EXTREME PAIN (9)

## 2023-01-06 NOTE — PROGRESS NOTES
SUBJECTIVE:   CC: Tanya is an 43 year old who presents for preventive health visit.   Patient has been advised of split billing requirements and indicates understanding: Yes  Healthy Habits:     Getting at least 3 servings of Calcium per day:  Yes    Bi-annual eye exam:  Yes    Dental care twice a year:  Yes    Sleep apnea or symptoms of sleep apnea:  None    Diet:  Gluten-free/reduced    Frequency of exercise:  1 day/week    Duration of exercise:  30-45 minutes    Taking medications regularly:  Yes    Medication side effects:  Not applicable    PHQ-2 Total Score: 0    Additional concerns today:  No    She has a history of ?bilateral axially Fat, ultrasound Left breast reviewed from 11/18/202 reviewed with  Normal axillary fat and perhaps a small amount of normal accessory breast tissue seen at the site of the swelling. She gets upper back pain and in interested in a possible breast reduction.      Today's PHQ-2 Score:   PHQ-2 ( 1999 [employer]) 1/6/2023   Q1: Little interest or pleasure in doing things 0   Q2: Feeling down, depressed or hopeless 0   PHQ-2 Score 0   PHQ-2 Total Score (12-17 Years)- Positive if 3 or more points; Administer PHQ-A if positive -   Q1: Little interest or pleasure in doing things Not at all   Q2: Feeling down, depressed or hopeless Not at all   PHQ-2 Score 0           Social History     Tobacco Use     Smoking status: Never     Smokeless tobacco: Never   Substance Use Topics     Alcohol use: No         Alcohol Use 1/6/2023   Prescreen: >3 drinks/day or >7 drinks/week? No   Prescreen: >3 drinks/day or >7 drinks/week? -   No flowsheet data found.    Reviewed orders with patient.  Reviewed health maintenance and updated orders accordingly - Yes  BP Readings from Last 3 Encounters:   01/06/23 120/88   10/03/22 (!) 130/90   07/22/22 124/76    Wt Readings from Last 3 Encounters:   01/06/23 98.3 kg (216 lb 12.8 oz)   07/22/22 98 kg (216 lb)   07/13/22 98.8 kg (217 lb 14.4 oz)                   Patient Active Problem List   Diagnosis     Dyspepsia     Lumbago     Chronic mixed headache syndrome     Uterine leiomyoma, unspecified location     AMA (advanced maternal age) multigravida 35+     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     Somatic dysfunction of lumbar region     Endometritis     GERD (gastroesophageal reflux disease)     Multigravida of advanced maternal age in third trimester     Morbid obesity (H)     Past Surgical History:   Procedure Laterality Date     D & C  12/05       Social History     Tobacco Use     Smoking status: Never     Smokeless tobacco: Never   Substance Use Topics     Alcohol use: No     Family History   Problem Relation Age of Onset     Allergies Mother         angioedema in family members unspecified     Diabetes Mother      Hypertension Mother      Diabetes Father      Hypertension Father      Diabetes Maternal Grandmother      Hypertension Maternal Grandmother      Asthma Maternal Grandmother      Diabetes Maternal Grandfather      Hypertension Maternal Grandfather      Asthma Maternal Grandfather      Cancer No family hx of      Cerebrovascular Disease No family hx of      Thyroid Disease No family hx of      Glaucoma No family hx of      Macular Degeneration No family hx of      Breast Cancer No family hx of      Colon Cancer No family hx of      Depression No family hx of      Anxiety Disorder No family hx of      Genetic Disorder No family hx of          Current Outpatient Medications   Medication Sig Dispense Refill     Cholecalciferol (VITAMIN D PO) Take 1,000 Units by mouth daily       cyclobenzaprine (FLEXERIL) 10 MG tablet Take 1 tablet (10 mg) by mouth nightly as needed for muscle spasms 45 tablet 1     ferrous sulfate (FEROSUL) 325 (65 Fe) MG tablet TAKE 1 TABLET BY MOUTH EVERY DAY WITH BREAKFAST       ibuprofen (ADVIL/MOTRIN) 600 MG tablet Take 1 tablet (600 mg) by mouth every 6 hours as needed for moderate pain (4-6) 30 tablet 0     omeprazole 20 MG tablet Take 1 tablet (20 mg)  by mouth daily 90 tablet 3     Prenatal Vit-Iron Carbonyl-FA (PRENATABS RX) 29-1 MG TABS TAKE 1 TABLET BY MOUTH EVERY DAY 30 tablet 23     Allergies   Allergen Reactions     Pork Gelatin [Pork (Porcine) Protein] GI Disturbance     Recent Labs   Lab Test 01/06/23  1534 10/03/22  1432 06/20/22  0747 02/02/22  1309 09/17/21  1044 09/17/21  1044 06/07/19  1439 12/22/17  0753 08/28/17  2112 06/14/16  1333 05/06/16  1030   A1C  --   --   --   --   --   --  4.9 5.2  --   --  5.0   *  --   --   --   --  117*  --   --   --   --  82   HDL 57  --   --   --   --  61  --   --   --   --  61   TRIG 71  --   --   --   --  105  --   --   --   --  63   ALT  --  22 27 23  --   --   --  24  --    < >  --    CR  --  0.68 0.68 0.73   < >  --   --  0.73 0.64   < >  --    GFRESTIMATED  --  >90 >90 >90   < >  --   --  >90 >90   < >  --    GFRESTBLACK  --   --   --   --   --   --   --  >90 >90   < >  --    POTASSIUM  --  3.6 3.8 3.8   < >  --   --  4.1 3.2*   < >  --    TSH 0.78  --   --  0.80   < > 0.79  --  1.49  --    < >  --     < > = values in this interval not displayed.        Breast Cancer Screening:    FHS-7: No flowsheet data found.  click delete button to remove this line now  Mammogram Screening - Offered annual screening and updated Health Maintenance for mutual plan based on risk factor consideration    Pertinent mammograms are reviewed under the imaging tab.    History of abnormal Pap smear: NO - age 30- 65 PAP every 3 years recommended  NO - age 30-65 PAP every 5 years with negative HPV co-testing recommended  PAP / HPV Latest Ref Rng & Units 5/6/2016 8/29/2012 11/18/2010   PAP (Historical) - NIL NIL NIL   HPV16 NEG Negative - -   HPV18 NEG Negative - -   HRHPV NEG Negative - -     Reviewed and updated as needed this visit by clinical staff   Tobacco  Allergies  Meds  Problems  Med Hx  Surg Hx  Fam Hx  Soc   Hx        Reviewed and updated as needed this visit by Provider      Review of Systems   Constitutional:  "Negative for chills and fever.   HENT: Negative for congestion, ear pain, hearing loss and sore throat.    Eyes: Negative for pain and visual disturbance.   Respiratory: Negative for cough and shortness of breath.    Cardiovascular: Negative for chest pain, palpitations and peripheral edema.   Gastrointestinal: Negative for abdominal pain, constipation, diarrhea, heartburn, hematochezia and nausea.   Genitourinary: Negative for dysuria, frequency, genital sores, hematuria and urgency.   Musculoskeletal: Negative for arthralgias, joint swelling and myalgias.   Skin: Negative for rash.   Neurological: Negative for dizziness, weakness, headaches and paresthesias.   Psychiatric/Behavioral: Negative for mood changes. The patient is not nervous/anxious.           OBJECTIVE:   /88   Pulse 78   Temp 97.7  F (36.5  C) (Temporal)   Ht 1.63 m (5' 4.17\")   Wt 98.3 kg (216 lb 12.8 oz)   LMP 12/19/2022 (Approximate)   SpO2 100%   Breastfeeding No   BMI 37.01 kg/m    Physical Exam  GENERAL: healthy, alert and no distress  EYES: Eyes grossly normal to inspection, PERRL and conjunctivae and sclerae normal  HENT: ear canals and TM's normal, nose and mouth without ulcers or lesions  NECK: no adenopathy, no asymmetry, masses, or scars and thyroid normal to palpation  RESP: lungs clear to auscultation - no rales, rhonchi or wheezes  BREAST: normal without masses, tenderness or nipple discharge and no palpable axillary masses or adenopathy  CV: regular rate and rhythm, normal S1 S2, no S3 or S4, no murmur, click or rub, no peripheral edema and peripheral pulses strong  ABDOMEN: soft, nontender, no hepatosplenomegaly, no masses and bowel sounds normal  MS: no gross musculoskeletal defects noted, no edema  SKIN: no suspicious lesions or rashes  NEURO: Normal strength and tone, mentation intact and speech normal  PSYCH: mentation appears normal, affect normal/bright    Results for orders placed or performed in visit on " 01/06/23   TSH with free T4 reflex     Status: Normal   Result Value Ref Range    TSH 0.78 0.40 - 4.00 mU/L   Iron and iron binding capacity     Status: Abnormal   Result Value Ref Range    Iron 39 35 - 180 ug/dL    Iron Binding Capacity 379 240 - 430 ug/dL    Iron Sat Index 10 (L) 15 - 46 %   Ferritin     Status: Abnormal   Result Value Ref Range    Ferritin 10 (L) 12 - 150 ng/mL   Lipid panel reflex to direct LDL Fasting     Status: Abnormal   Result Value Ref Range    Cholesterol 191 <200 mg/dL    Triglycerides 71 <150 mg/dL    Direct Measure HDL 57 >=50 mg/dL    LDL Cholesterol Calculated 120 (H) <=100 mg/dL    Non HDL Cholesterol 134 (H) <130 mg/dL    Patient Fasting > 8hrs? Unknown     Narrative    Cholesterol  Desirable:  <200 mg/dL    Triglycerides  Normal:  Less than 150 mg/dL  Borderline High:  150-199 mg/dL  High:  200-499 mg/dL  Very High:  Greater than or equal to 500 mg/dL    Direct Measure HDL  Female:  Greater than or equal to 50 mg/dL   Male:  Greater than or equal to 40 mg/dL    LDL Cholesterol  Desirable:  <100mg/dL  Above Desirable:  100-129 mg/dL   Borderline High:  130-159 mg/dL   High:  160-189 mg/dL   Very High:  >= 190 mg/dL    Non HDL Cholesterol  Desirable:  130 mg/dL  Above Desirable:  130-159 mg/dL  Borderline High:  160-189 mg/dL  High:  190-219 mg/dL  Very High:  Greater than or equal to 220 mg/dL   CBC with platelets and differential     Status: Abnormal   Result Value Ref Range    WBC Count 7.4 4.0 - 11.0 10e3/uL    RBC Count 4.64 3.80 - 5.20 10e6/uL    Hemoglobin 11.7 11.7 - 15.7 g/dL    Hematocrit 35.8 35.0 - 47.0 %    MCV 77 (L) 78 - 100 fL    MCH 25.2 (L) 26.5 - 33.0 pg    MCHC 32.7 31.5 - 36.5 g/dL    RDW 13.3 10.0 - 15.0 %    Platelet Count 258 150 - 450 10e3/uL    % Neutrophils 70 %    % Lymphocytes 22 %    % Monocytes 7 %    % Eosinophils 1 %    % Basophils 0 %    % Immature Granulocytes 0 %    Absolute Neutrophils 5.2 1.6 - 8.3 10e3/uL    Absolute Lymphocytes 1.6 0.8 - 5.3  10e3/uL    Absolute Monocytes 0.5 0.0 - 1.3 10e3/uL    Absolute Eosinophils 0.1 0.0 - 0.7 10e3/uL    Absolute Basophils 0.0 0.0 - 0.2 10e3/uL    Absolute Immature Granulocytes 0.0 <=0.4 10e3/uL   CBC with platelets and differential     Status: Abnormal    Narrative    The following orders were created for panel order CBC with platelets and differential.  Procedure                               Abnormality         Status                     ---------                               -----------         ------                     CBC with platelets and d...[[id number]]  Abnormal            Final result                 Please view results for these tests on the individual orders.         ASSESSMENT/PLAN:   (Z00.00) Routine general medical examination at a health care facility  (primary encounter diagnosis)  Comment: See counseling  Plan: REVIEW OF HEALTH MAINTENANCE PROTOCOL ORDERS    (R10.2) Pelvic pain in female  Plan: ibuprofen (ADVIL/MOTRIN) 600 MG tablet      (K21.9) Gastroesophageal reflux disease without esophagitis  Comment: Stable, no alarming symptoms  Plan: omeprazole 20 MG tablet      (E66.01) Morbid obesity (H)  Comment: Weight management plan: Discussed healthy diet and exercise guidelines      (Z12.31) Encounter for screening mammogram for breast cancer  Plan: MA Screening Digital Bilateral      (G44.209) Tension headache  Plan: cyclobenzaprine (FLEXERIL) 10 MG tablet      (N62) Macromastia  Plan: Adult Plastic Surgery  Referral,         CANCELED: Adult Plastic Surgery          Referral        (Z13.220) Lipid screening  Plan: Lipid panel reflex to direct LDL Fasting      (Z13.29,  Z13.228,  Z13.0) Screening for endocrine, metabolic and immunity disorder  Plan: TSH with free T4 reflex      (E61.1) Iron deficiency  Plan: CBC with platelets and differential, Iron and         iron binding capacity, Ferritin      Patient has been advised of split billing requirements and indicates understanding:  "Yes      COUNSELING:  Reviewed preventive health counseling, as reflected in patient instructions       Regular exercise       Healthy diet/nutrition       Vision screening       Osteoporosis prevention/bone health       Colorectal Cancer Screening       Consider Hep C screening for all patients one time for ages 18-79 years       Advance Care Planning      BMI:   Estimated body mass index is 37.01 kg/m  as calculated from the following:    Height as of this encounter: 1.63 m (5' 4.17\").    Weight as of this encounter: 98.3 kg (216 lb 12.8 oz).   Weight management plan: Discussed healthy diet and exercise guidelines      She reports that she has never smoked. She has never used smokeless tobacco.          Mele Floyd MD  [facility]  "

## 2023-01-19 DIAGNOSIS — R10.2 PELVIC PAIN IN FEMALE: ICD-10-CM

## 2023-01-20 RX ORDER — IBUPROFEN 600 MG/1
600 TABLET, FILM COATED ORAL EVERY 6 HOURS PRN
Qty: 30 TABLET | Refills: 0 | OUTPATIENT
Start: 2023-01-20

## 2023-01-26 DIAGNOSIS — N76.0 VAGINITIS AND VULVOVAGINITIS: Primary | ICD-10-CM

## 2023-01-26 RX ORDER — FLUCONAZOLE 150 MG/1
150 TABLET ORAL
Qty: 3 TABLET | Refills: 0 | Status: SHIPPED | OUTPATIENT
Start: 2023-01-26 | End: 2023-02-02

## 2023-02-02 ENCOUNTER — ANCILLARY PROCEDURE (OUTPATIENT)
Dept: MAMMOGRAPHY | Facility: CLINIC | Age: 43
End: 2023-02-02
Attending: FAMILY MEDICINE
Payer: COMMERCIAL

## 2023-02-02 DIAGNOSIS — Z12.31 ENCOUNTER FOR SCREENING MAMMOGRAM FOR BREAST CANCER: ICD-10-CM

## 2023-02-02 PROCEDURE — 77067 SCR MAMMO BI INCL CAD: CPT | Performed by: RADIOLOGY

## 2023-02-02 PROCEDURE — 77063 BREAST TOMOSYNTHESIS BI: CPT | Performed by: RADIOLOGY

## 2023-02-05 DIAGNOSIS — R10.2 PELVIC PAIN IN FEMALE: ICD-10-CM

## 2023-02-06 RX ORDER — IBUPROFEN 600 MG/1
600 TABLET, FILM COATED ORAL EVERY 6 HOURS PRN
Qty: 30 TABLET | Refills: 0 | Status: SHIPPED | OUTPATIENT
Start: 2023-02-06 | End: 2023-05-16

## 2023-03-15 ENCOUNTER — TELEPHONE (OUTPATIENT)
Dept: SURGERY | Facility: CLINIC | Age: 43
End: 2023-03-15
Payer: COMMERCIAL

## 2023-03-15 NOTE — TELEPHONE ENCOUNTER
Called pt to let her know Dr. Lundy will be out 3/17 for her appt, but we can get her in to see Raquel Dumont 3/21.  Pt confirmed time with Raquel will work at 12pm.    Informed her I will be canceling her 3/17 appt and scheduling her with Raquel 3/21.    Mariel Orlando RN

## 2023-03-15 NOTE — TELEPHONE ENCOUNTER
Revieved pt's appts for Friday.  I saw she has an ultrasound and mammogram scheduled.  I called to let the pt know she should still come for these appts.  The only appt I rescheduled was the appt with Dr. Lundy.    Pt verbalized understanding and thanked me for call.    Mariel Orlando RN

## 2023-03-17 ENCOUNTER — ANCILLARY PROCEDURE (OUTPATIENT)
Dept: ULTRASOUND IMAGING | Facility: CLINIC | Age: 43
End: 2023-03-17
Attending: FAMILY MEDICINE
Payer: COMMERCIAL

## 2023-03-17 ENCOUNTER — ANCILLARY PROCEDURE (OUTPATIENT)
Dept: MAMMOGRAPHY | Facility: CLINIC | Age: 43
End: 2023-03-17
Attending: FAMILY MEDICINE
Payer: COMMERCIAL

## 2023-03-17 DIAGNOSIS — R92.8 ABNORMAL MAMMOGRAM: ICD-10-CM

## 2023-03-17 PROCEDURE — G0279 TOMOSYNTHESIS, MAMMO: HCPCS | Performed by: RADIOLOGY

## 2023-03-17 PROCEDURE — 77065 DX MAMMO INCL CAD UNI: CPT | Mod: LT | Performed by: RADIOLOGY

## 2023-03-17 PROCEDURE — 76642 ULTRASOUND BREAST LIMITED: CPT | Mod: LT | Performed by: RADIOLOGY

## 2023-03-20 ENCOUNTER — TELEPHONE (OUTPATIENT)
Dept: SURGERY | Facility: CLINIC | Age: 43
End: 2023-03-20
Payer: COMMERCIAL

## 2023-03-20 NOTE — TELEPHONE ENCOUNTER
Patient called to complete previsit in regards to appointment with Raquel Dumont on 3/21/23 No answer, unable to leave voicemail.    [facility] Visit Facilitator- Surgical Specialties

## 2023-03-21 ENCOUNTER — OFFICE VISIT (OUTPATIENT)
Dept: SURGERY | Facility: CLINIC | Age: 43
End: 2023-03-21
Payer: COMMERCIAL

## 2023-03-21 VITALS — WEIGHT: 215.5 LBS | BODY MASS INDEX: 36.79 KG/M2 | HEIGHT: 64 IN

## 2023-03-21 DIAGNOSIS — N62 MACROMASTIA: ICD-10-CM

## 2023-03-21 PROCEDURE — 99203 OFFICE O/P NEW LOW 30 MIN: CPT | Performed by: PHYSICIAN ASSISTANT

## 2023-03-21 NOTE — LETTER
3/21/2023         RE: Tanya Denny  [address]  [place] [address]        Dear Colleague,    Thank you for referring your patient, Tanya Denny, to the [facility]. Please see a copy of my visit note below.    Plastic and Reconstructive Surgery Note  3/21/2023    HPI:  Tanya  is a 43 year old femalepresenting for evaluation of left axillary swelling and tenderness.   She notes that following the birth of her 4th child, she noticed this swelling/tenderness to the left lateral breast/axillary area. She notes that her weight has changed, she has gained and lost weight and breast size, and that this area of swelling/tenderness persists.   She has no drainage, redness or warmth. This swelling and tenderness is bothersome and interferes with her ability to comfortably wear clothes.     She has had mammograms and US evaluations to rule out malignant sources. Her most recent imaging was 3/17/2023.   Medications:  Current Outpatient Medications   Medication     Cholecalciferol (VITAMIN D PO)     cyclobenzaprine (FLEXERIL) 10 MG tablet     ferrous sulfate (FEROSUL) 325 (65 Fe) MG tablet     ibuprofen (ADVIL/MOTRIN) 600 MG tablet     omeprazole 20 MG tablet     Prenatal Vit-Iron Carbonyl-FA (PRENATABS RX) 29-1 MG TABS     No current facility-administered medications for this visit.        Allergies:     Allergies   Allergen Reactions     Pork Gelatin [Pork (Porcine) Protein] GI Disturbance      No antibiotic allergy     Surgical History:  Past Surgical History:   Procedure Laterality Date     D & C  12/05      No prior chest or breast surgery     Family History:  I have reviewed this patient's family history and updated it with pertinent information if needed.  Family History   Problem Relation Age of Onset     Allergies Mother         angioedema in family members unspecified     Diabetes Mother      Hypertension Mother      Diabetes Father      Hypertension  Father      Diabetes Maternal Grandmother      Hypertension Maternal Grandmother      Asthma Maternal Grandmother      Diabetes Maternal Grandfather      Hypertension Maternal Grandfather      Asthma Maternal Grandfather      Cancer No family hx of      Cerebrovascular Disease No family hx of      Thyroid Disease No family hx of      Glaucoma No family hx of      Macular Degeneration No family hx of      Breast Cancer No family hx of      Colon Cancer No family hx of      Depression No family hx of      Anxiety Disorder No family hx of      Genetic Disorder No family hx of           Breast Cancer History  none    Social History:   Social History     Tobacco Use     Smoking status: Never     Smokeless tobacco: Never   Vaping Use     Vaping Use: Never used   Substance Use Topics     Alcohol use: No     Drug use: No     Lives locally, [facility]    ROS:  +left axillary/breast swelling  +tenderness to swelling area     No DVT/PE, No MI/CVA, No CP/SOB    Physical Exam:   There were no vitals taken for this visit.  Gen: well appearing, NAD  Chest:         Mammogram  Examinations: MA DIAGNOSTIC LEFT W/ ZURDO, US BREAST LEFT LIMITED 1-3  QUADRANTS, 3/17/2023 8:51 AM     Comparisons: 2/2/2023     History: Left breast screening call back     Technique: Tomosynthesis      BREAST DENSITY: Scattered fibroglandular densities.     Findings:     Mammogram:  Additional mammographic views of the left breast were performed for  further evaluation of possible asymmetry seen on screening mammogram  2/2/2023. On additional views, the finding persists and has the  appearance of normal breast tissue. Additionally there is a lymph node  at 3:00, anterior depth.     Ultrasound:  Targeted ultrasound evaluation was performed by the technologist and  radiologist. Ultrasound of the left breast demonstrates a benign  appearing intramammary lymph node with an adjacent subcentimeter cysts  at 1:30 position 5 cm from nipple (at real-time  ultrasound at the 3:00  position). No suspicious findings in the left upper outer quadrant.                                                                      IMPRESSION: BI-RADS CATEGORY: 2 - Benign.     RECOMMENDED FOLLOW-UP: Annual Mammography.    Assessment:  43 year old female with left axillary/breast swelling and tenderness      Plan:   Patient's symptoms and imaging were reviewed with Dr. Lundy.   This is something that is visible, and can be removed surgically to address the patient's years long discomfort.   We will submit for PA and have the patient have a pre-surgery consultation with Dr. Lundy prior to surgery.     All the patient's questions were answered, and she understands and agrees with plan.         Again, thank you for allowing me to participate in the care of your patient.        Sincerely,        Raquel Dumont PA-C

## 2023-03-21 NOTE — NURSING NOTE
"Tanya Denny's chief complaint for this visit includes:  Chief Complaint   Patient presents with     New Patient     Breast reduction consult     PCP: Mele Floyd    Referring Provider:  Mele Floyd MD  [address] [place] [address]    Ht 1.626 m (5' 4\")   Wt 97.8 kg (215 lb 8 oz)   BMI 36.99 kg/m    Data Unavailable        Allergies   Allergen Reactions     Pork Gelatin [Pork (Porcine) Protein] GI Disturbance         Do you need any medication refills at today's visit? Lucrecia carvajal Clinic Visit Facilitator- Surgical Specialties       "

## 2023-03-21 NOTE — PROGRESS NOTES
Plastic and Reconstructive Surgery Note  3/21/2023    HPI:  Tanya  is a 43 year old femalepresenting for evaluation of left axillary swelling and tenderness.   She notes that following the birth of her 4th child, she noticed this swelling/tenderness to the left lateral breast/axillary area. She notes that her weight has changed, she has gained and lost weight and breast size, and that this area of swelling/tenderness persists.   She has no drainage, redness or warmth. This swelling and tenderness is bothersome and interferes with her ability to comfortably wear clothes.     She has had mammograms and US evaluations to rule out malignant sources. Her most recent imaging was 3/17/2023.   Medications:  Current Outpatient Medications   Medication     Cholecalciferol (VITAMIN D PO)     cyclobenzaprine (FLEXERIL) 10 MG tablet     ferrous sulfate (FEROSUL) 325 (65 Fe) MG tablet     ibuprofen (ADVIL/MOTRIN) 600 MG tablet     omeprazole 20 MG tablet     Prenatal Vit-Iron Carbonyl-FA (PRENATABS RX) 29-1 MG TABS     No current facility-administered medications for this visit.        Allergies:     Allergies   Allergen Reactions     Pork Gelatin [Pork (Porcine) Protein] GI Disturbance      No antibiotic allergy     Surgical History:  Past Surgical History:   Procedure Laterality Date     D & C  12/05      No prior chest or breast surgery     Family History:  I have reviewed this patient's family history and updated it with pertinent information if needed.  Family History   Problem Relation Age of Onset     Allergies Mother         angioedema in family members unspecified     Diabetes Mother      Hypertension Mother      Diabetes Father      Hypertension Father      Diabetes Maternal Grandmother      Hypertension Maternal Grandmother      Asthma Maternal Grandmother      Diabetes Maternal Grandfather      Hypertension Maternal Grandfather      Asthma Maternal Grandfather      Cancer No family hx of      Cerebrovascular Disease  No family hx of      Thyroid Disease No family hx of      Glaucoma No family hx of      Macular Degeneration No family hx of      Breast Cancer No family hx of      Colon Cancer No family hx of      Depression No family hx of      Anxiety Disorder No family hx of      Genetic Disorder No family hx of           Breast Cancer History  none    Social History:   Social History     Tobacco Use     Smoking status: Never     Smokeless tobacco: Never   Vaping Use     Vaping Use: Never used   Substance Use Topics     Alcohol use: No     Drug use: No     Lives locally, [facility]    ROS:  +left axillary/breast swelling  +tenderness to swelling area     No DVT/PE, No MI/CVA, No CP/SOB    Physical Exam:   There were no vitals taken for this visit.  Gen: well appearing, NAD  Chest:         Mammogram  Examinations: MA DIAGNOSTIC LEFT W/ ZURDO, US BREAST LEFT LIMITED 1-3  QUADRANTS, 3/17/2023 8:51 AM     Comparisons: 2/2/2023     History: Left breast screening call back     Technique: Tomosynthesis      BREAST DENSITY: Scattered fibroglandular densities.     Findings:     Mammogram:  Additional mammographic views of the left breast were performed for  further evaluation of possible asymmetry seen on screening mammogram  2/2/2023. On additional views, the finding persists and has the  appearance of normal breast tissue. Additionally there is a lymph node  at 3:00, anterior depth.     Ultrasound:  Targeted ultrasound evaluation was performed by the technologist and  radiologist. Ultrasound of the left breast demonstrates a benign  appearing intramammary lymph node with an adjacent subcentimeter cysts  at 1:30 position 5 cm from nipple (at real-time ultrasound at the 3:00  position). No suspicious findings in the left upper outer quadrant.                                                                      IMPRESSION: BI-RADS CATEGORY: 2 - Benign.     RECOMMENDED FOLLOW-UP: Annual Mammography.    Assessment:  43 year old female  with left axillary/breast swelling and tenderness      Plan:   Patient's symptoms and imaging were reviewed with Dr. Lundy.   This is something that is visible, and can be removed surgically to address the patient's years long discomfort.   We will submit for PA and have the patient have a pre-surgery consultation with Dr. Lundy prior to surgery.     All the patient's questions were answered, and she understands and agrees with plan.

## 2023-04-04 ENCOUNTER — TELEPHONE (OUTPATIENT)
Dept: SURGERY | Facility: CLINIC | Age: 43
End: 2023-04-04
Payer: COMMERCIAL

## 2023-04-04 NOTE — TELEPHONE ENCOUNTER
Hello,     Please submit for prior auth for left breast/axillary mass removal.     Thanks,   Raquel

## 2023-04-07 NOTE — TELEPHONE ENCOUNTER
PB DOS: TBD  Type of Procedure: left breast/axillary mass removal  CPT Codes: 33945- Excision of cyst, fibroadenoma, or other benign or malignant tumor, aberrant breast tissue, duct lesion, nipple or areolar lesion (except 90373), open, male or female, 1 or more lesions                 27049- Excision, tumor, soft tissue of upper arm or elbow area, subcutaneous; less than 3 cm         98125- Excision, tumor, soft tissue of upper arm or elbow area, subcutaneous; 3 cm or greater      11972 Excision, benign lesion including margins, trunk, 1.1 to 2.0 cm  48493 Excision, benign lesion including margins, trunk, 2.1 to 3.0 cm  96695 Excision, benign lesion including margins, trunk, 3.1 to 4.0 cm  78387 Excision, benign lesion including margins, trunk, over 4.0 cm  11327 Repair intermediate, wound of trunk, 2.5 cm or less  92109 Repair intermediate, wound of trunk, 2.6 to 7.5 cm  47464 Repair intermediate, wound of trunk, 7.6 to 12.5 cm    ICD10 Codes: R22.32    Surgeon/Ordering provider: Dr. Lundy  Pre-cert/Authorization completed:  No PA Required  Payer: [facility] [place]  Spoke to [facility] PA list  Ref. # / Auth #   Valid Dates:     This is not a guarantee of payment. Payment is subject to the patient's plan benefits and eligibility at the time services are rendered. Please advise the patient to call their insurance and verify their benefits.

## 2023-04-10 ENCOUNTER — MYC MEDICAL ADVICE (OUTPATIENT)
Dept: SURGERY | Facility: CLINIC | Age: 43
End: 2023-04-10
Payer: COMMERCIAL

## 2023-04-10 NOTE — TELEPHONE ENCOUNTER
Called to let pt know no PA required per [facility] Dept.    Instructed her to call her insurance company directly to ensure coverage.     Once this is completed, call us back or send us a MyChart if still interested in proceeding.    Pt verbalized understanding.     Sent MyChart message with CPT codes.    Mariel Orlando RN

## 2023-04-10 NOTE — TELEPHONE ENCOUNTER
Discussed PA process with pt on the phone.  Sending BOARDZ message with codes.    Mariel Orlando RN

## 2023-05-19 NOTE — TELEPHONE ENCOUNTER
5/19 Patient scheduled for next available with Dr. Lundy in person. Patient requested to be added to wait list as well.     Leticia carvajal Procedure   Dermatology, Surgery, Urology  [facility]

## 2023-05-21 ENCOUNTER — OFFICE VISIT (OUTPATIENT)
Dept: URGENT CARE | Facility: URGENT CARE | Age: 43
End: 2023-05-21
Payer: COMMERCIAL

## 2023-05-21 VITALS
HEART RATE: 52 BPM | OXYGEN SATURATION: 100 % | DIASTOLIC BLOOD PRESSURE: 82 MMHG | WEIGHT: 212.4 LBS | RESPIRATION RATE: 20 BRPM | TEMPERATURE: 99.3 F | BODY MASS INDEX: 36.46 KG/M2 | SYSTOLIC BLOOD PRESSURE: 130 MMHG

## 2023-05-21 DIAGNOSIS — M79.673 CHRONIC HEEL PAIN, UNSPECIFIED LATERALITY: Primary | ICD-10-CM

## 2023-05-21 DIAGNOSIS — G89.29 CHRONIC HEEL PAIN, UNSPECIFIED LATERALITY: Primary | ICD-10-CM

## 2023-05-21 PROCEDURE — 99213 OFFICE O/P EST LOW 20 MIN: CPT | Performed by: PHYSICIAN ASSISTANT

## 2023-05-21 RX ORDER — PREDNISONE 20 MG/1
40 TABLET ORAL DAILY
Qty: 10 TABLET | Refills: 0 | Status: SHIPPED | OUTPATIENT
Start: 2023-05-21 | End: 2023-05-26

## 2023-05-21 ASSESSMENT — ENCOUNTER SYMPTOMS
SHORTNESS OF BREATH: 0
JOINT SWELLING: 1
CHEST TIGHTNESS: 0
MYALGIAS: 1
PALPITATIONS: 0
WHEEZING: 0
FATIGUE: 0
COLOR CHANGE: 0
NECK PAIN: 0
CONSTITUTIONAL NEGATIVE: 1
FEVER: 0
ARTHRALGIAS: 1
NECK STIFFNESS: 0
CHILLS: 0
COUGH: 0
WOUND: 0
CARDIOVASCULAR NEGATIVE: 1
BACK PAIN: 0

## 2023-05-21 NOTE — PROGRESS NOTES
Ange Martínez is a 43 year old, presenting for the following health issues:  Foot Injury (Both heels; about 2yrs but has gotten worse within the last week)    HPI   Musculoskeletal problem/pain  Onset/Duration: 2years  Description  Location: bilateral foot, heel pain  Joint Swelling: Yes  Redness: no  Pain: YES  Warmth: no  Intensity:  moderate  Progression of Symptoms:  same  Accompanying signs and symptoms:   Fevers: no  Numbness/tingling/weakness: no  History  Trauma to the area: no.  Developed heel pain and swelling after having her 2nd child.     Recent illness:  no  Previous similar problem: no  Previous evaluation:  no  Precipitating or alleviating factors:  Aggravating factors include: standing, walking, overuse  Therapies tried and outcome: rest/inactivity, heal pads, acetaminophen, Ibuprofen, physical therapy with minimal relief    Patient Active Problem List   Diagnosis     Dyspepsia     Lumbago     Chronic mixed headache syndrome     Uterine leiomyoma, unspecified location     AMA (advanced maternal age) multigravida 35+     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     Somatic dysfunction of lumbar region     Endometritis     GERD (gastroesophageal reflux disease)     Multigravida of advanced maternal age in third trimester     Morbid obesity (H)     Current Outpatient Medications   Medication     Cholecalciferol (VITAMIN D PO)     cyclobenzaprine (FLEXERIL) 10 MG tablet     ferrous sulfate (FEROSUL) 325 (65 Fe) MG tablet     ibuprofen (ADVIL/MOTRIN) 600 MG tablet     omeprazole 20 MG tablet     Prenatal Vit-Iron Carbonyl-FA (PRENATABS RX) 29-1 MG TABS     No current facility-administered medications for this visit.        Allergies   Allergen Reactions     Pork Gelatin [Pork (Porcine) Protein] GI Disturbance     Review of Systems   Constitutional: Negative.  Negative for chills, fatigue and fever.   Respiratory: Negative for cough, chest tightness, shortness of breath and wheezing.    Cardiovascular: Negative.   Negative for chest pain, palpitations and peripheral edema.   Musculoskeletal: Positive for arthralgias, gait problem, joint swelling and myalgias. Negative for back pain, neck pain and neck stiffness.   Skin: Negative.  Negative for color change, pallor, rash and wound.   All other systems reviewed and are negative.           Objective    /82   Pulse 52   Temp 99.3  F (37.4  C) (Tympanic)   Resp 20   Wt 96.3 kg (212 lb 6.4 oz)   SpO2 100%   BMI 36.46 kg/m    Body mass index is 36.46 kg/m .  Physical Exam  Vitals and nursing note reviewed.   Constitutional:       General: She is not in acute distress.     Appearance: Normal appearance. She is normal weight. She is not ill-appearing.   Musculoskeletal:      Right foot: Normal range of motion and normal capillary refill. Swelling (arch of the foot) and tenderness (medial foot and heel) present. No deformity, bony tenderness or crepitus. Normal pulse.      Left foot: Normal range of motion and normal capillary refill. Swelling (arch of the foot) and tenderness (medial foot and heel) present. No deformity, bony tenderness or crepitus. Normal pulse.   Skin:     General: Skin is warm.      Capillary Refill: Capillary refill takes less than 2 seconds.   Neurological:      Mental Status: She is alert and oriented to person, place, and time.      Sensory: Sensation is intact.      Motor: Motor function is intact.      Gait: Gait is intact.      Deep Tendon Reflexes: Reflexes are normal and symmetric.   Psychiatric:         Mood and Affect: Mood normal.         Behavior: Behavior normal.         Thought Content: Thought content normal.         Judgment: Judgment normal.          Assessment/Plan:  Chronic heel pain, unspecified laterality:  No trauma or injuries.  ?plantar fasciitis vs flat feet.  Recommend RICE, wear comfortable shoes, and will give [id number] and voltarent gel prn pain.  She has failed physical therapy, heel supports etc.  Will also send to  orthopedics for further evaluation and management.  Recheck in clinic if symptoms worsen or if symptoms do not improve.   -     Orthopedic  Referral  -     diclofenac (VOLTAREN) 1 % topical gel; Apply 2 g topically 4 times daily  -     predniSONE (DELTASONE) 20 MG tablet; Take 2 tablets (40 mg) by mouth daily for 5 days        Rivka Stanley PA-C

## 2023-05-22 ENCOUNTER — TELEPHONE (OUTPATIENT)
Dept: PLASTIC SURGERY | Facility: CLINIC | Age: 43
End: 2023-05-22
Payer: COMMERCIAL

## 2023-05-22 NOTE — TELEPHONE ENCOUNTER
Called pt to discuss concerns she had.      No answer, left generic voicemail for pt to return call to clinic.     Mariel Orlando RN

## 2023-05-22 NOTE — TELEPHONE ENCOUNTER
[facility] Call Center    Phone Message    May a detailed message be left on voicemail: yes     Reason for Call: Other: Pt would like to speak with Dr Ruiz care team regarding some concerns that she doesn't think can wait until her next appt on 07/07     Action Taken: Other: ortho csc    Travel Screening: Not Applicable

## 2023-05-23 NOTE — TELEPHONE ENCOUNTER
Called pt to see what questions/concerns she had.  She states schedule was addressed yesterday and she doesn't need anything further.    Reviewed chart.  Pt's 7/7 appt rescheduled to 7/14.      Closing encounter.    Mariel Orlando RN

## 2023-05-23 NOTE — TELEPHONE ENCOUNTER
Patient called and left message on department VM on 5/22/23 at 4:50pm returning clinic call.    Doris Negrete LPN

## 2023-06-12 DIAGNOSIS — R10.2 PELVIC PAIN IN FEMALE: ICD-10-CM

## 2023-06-13 RX ORDER — IBUPROFEN 600 MG/1
600 TABLET, FILM COATED ORAL EVERY 6 HOURS PRN
Qty: 30 TABLET | Refills: 0 | Status: SHIPPED | OUTPATIENT
Start: 2023-06-13 | End: 2023-08-28

## 2023-07-14 ENCOUNTER — OFFICE VISIT (OUTPATIENT)
Dept: SURGERY | Facility: CLINIC | Age: 43
End: 2023-07-14
Payer: COMMERCIAL

## 2023-07-14 DIAGNOSIS — N64.4 MASTODYNIA OF LEFT BREAST: Primary | ICD-10-CM

## 2023-07-14 PROCEDURE — 99215 OFFICE O/P EST HI 40 MIN: CPT | Performed by: STUDENT IN AN ORGANIZED HEALTH CARE EDUCATION/TRAINING PROGRAM

## 2023-07-14 NOTE — NURSING NOTE
Tanya Denny's chief complaint for this visit includes:  Chief Complaint   Patient presents with     RECHECK     Breast reduction, saw PA     PCP: Mele Floyd    Referring Provider:  No referring provider defined for this encounter.    There were no vitals taken for this visit.  Data Unavailable        Allergies   Allergen Reactions     Pork Gelatin [Pork (Porcine) Protein] GI Disturbance         Do you need any medication refills at today's visit? No    Olive carvajal Clinic Visit Facilitator- Surgical Specialties

## 2023-07-14 NOTE — LETTER
2023         RE: Tanya Denny  [address]  [place] [address]        Dear Colleague,    Thank you for referring your patient, Tanya Denny, to the [facility]. Please see a copy of my visit note below.    PRS    HPI: 43-year-old presenting with left breast upper outer quadrant discomfort.  This has been ongoing since .  She has had pain particularly with breast-feeding both in  and .  Her last pregnancy was in , during which she also breast-fed, and since then the pain has not gone away.  Patient has no skin changes, nipple discharge or masses.  Last mammogram was in 2023.  This was BI-RADS Category 2, benign.  She would like the area dressed with surgical excision if possible.    ROS: Negative, see HPI  Past medical history: Nondiabetic  Past surgical history: No prior surgeries to the breast  Medications: No blood thinners  Allergies: None to antibiotics  Social history: Non-smoker, denies any tobacco or nicotine use  Family history: No bleeding or clotting problems, issues with anesthesia.  Father  at the age of 90 from an ischemic stroke.    Examination:  Nonlabored breathing  Not distressed  Left accessory breast tissue that is tender to palpation with no overlying skin changes and no masses, also no associated axillary lymphadenopathy    Mammogram reviewed    A/P: 43-year-old female presenting with left accessory breast tissue that is symptomatic    -In my opinion, it would be best suited that the patient is seen, evaluated and treated by one of our breast oncologic surgeons.  The reason why is that they have more experience with ensuring adequate and full removal of breast tissue.  Patient is agreeable to see Dr. Resendez or one of her partners.  -A total of 45 minutes was devoted to review of chart, direct face-to-face patient counseling and documentation during this encounter, exclusive of any procedure  performed.    Sonido Lundy MD, PhD      Again, thank you for allowing me to participate in the care of your patient.        Sincerely,        Sonido Lundy MD

## 2023-07-14 NOTE — PROGRESS NOTES
PRS    HPI: 43-year-old presenting with left breast upper outer quadrant discomfort.  This has been ongoing since .  She has had pain particularly with breast-feeding both in  and .  Her last pregnancy was in , during which she also breast-fed, and since then the pain has not gone away.  Patient has no skin changes, nipple discharge or masses.  Last mammogram was in 2023.  This was BI-RADS Category 2, benign.  She would like the area dressed with surgical excision if possible.    ROS: Negative, see HPI  Past medical history: Nondiabetic  Past surgical history: No prior surgeries to the breast  Medications: No blood thinners  Allergies: None to antibiotics  Social history: Non-smoker, denies any tobacco or nicotine use  Family history: No bleeding or clotting problems, issues with anesthesia.  Father  at the age of 90 from an ischemic stroke.    Examination:  Nonlabored breathing  Not distressed  Left accessory breast tissue that is tender to palpation with no overlying skin changes and no masses, also no associated axillary lymphadenopathy    Mammogram reviewed    A/P: 43-year-old female presenting with left accessory breast tissue that is symptomatic    -In my opinion, it would be best suited that the patient is seen, evaluated and treated by one of our breast oncologic surgeons.  The reason why is that they have more experience with ensuring adequate and full removal of breast tissue.  Patient is agreeable to see Dr. Resendez or one of her partners.  -A total of 45 minutes was devoted to review of chart, direct face-to-face patient counseling and documentation during this encounter, exclusive of any procedure performed.    Sonido Lundy MD, PhD

## 2023-07-16 ENCOUNTER — OFFICE VISIT (OUTPATIENT)
Dept: URGENT CARE | Facility: URGENT CARE | Age: 43
End: 2023-07-16
Payer: COMMERCIAL

## 2023-07-16 VITALS
DIASTOLIC BLOOD PRESSURE: 79 MMHG | SYSTOLIC BLOOD PRESSURE: 115 MMHG | TEMPERATURE: 98.2 F | WEIGHT: 210.1 LBS | HEART RATE: 64 BPM | BODY MASS INDEX: 36.06 KG/M2 | OXYGEN SATURATION: 100 %

## 2023-07-16 DIAGNOSIS — R31.29 MICROSCOPIC HEMATURIA: ICD-10-CM

## 2023-07-16 DIAGNOSIS — B37.31 CANDIDA VAGINITIS: Primary | ICD-10-CM

## 2023-07-16 LAB
ALBUMIN UR-MCNC: NEGATIVE MG/DL
APPEARANCE UR: CLEAR
BACTERIA #/AREA URNS HPF: ABNORMAL /HPF
BILIRUB UR QL STRIP: NEGATIVE
CLUE CELLS: ABNORMAL
COLOR UR AUTO: YELLOW
GLUCOSE UR STRIP-MCNC: NEGATIVE MG/DL
HGB UR QL STRIP: ABNORMAL
KETONES UR STRIP-MCNC: NEGATIVE MG/DL
LEUKOCYTE ESTERASE UR QL STRIP: NEGATIVE
MUCOUS THREADS #/AREA URNS LPF: PRESENT /LPF
NITRATE UR QL: NEGATIVE
PH UR STRIP: 6.5 [PH] (ref 5–7)
RBC #/AREA URNS AUTO: ABNORMAL /HPF
SP GR UR STRIP: 1.02 (ref 1–1.03)
SQUAMOUS #/AREA URNS AUTO: ABNORMAL /LPF
TRICHOMONAS, WET PREP: ABNORMAL
UROBILINOGEN UR STRIP-ACNC: 1 E.U./DL
WBC #/AREA URNS AUTO: ABNORMAL /HPF
WBC'S/HIGH POWER FIELD, WET PREP: ABNORMAL
YEAST, WET PREP: PRESENT

## 2023-07-16 PROCEDURE — 81001 URINALYSIS AUTO W/SCOPE: CPT | Performed by: PHYSICIAN ASSISTANT

## 2023-07-16 PROCEDURE — 87210 SMEAR WET MOUNT SALINE/INK: CPT | Performed by: PHYSICIAN ASSISTANT

## 2023-07-16 PROCEDURE — 99213 OFFICE O/P EST LOW 20 MIN: CPT | Performed by: PHYSICIAN ASSISTANT

## 2023-07-16 RX ORDER — FLUCONAZOLE 150 MG/1
150 TABLET ORAL ONCE
Qty: 2 TABLET | Refills: 0 | Status: SHIPPED | OUTPATIENT
Start: 2023-07-16 | End: 2023-07-16

## 2023-07-16 ASSESSMENT — ENCOUNTER SYMPTOMS
FATIGUE: 0
FLANK PAIN: 0
HEMATOCHEZIA: 0
PALPITATIONS: 0
HEMATURIA: 0
FREQUENCY: 0
CHEST TIGHTNESS: 0
CHILLS: 0
RESPIRATORY NEGATIVE: 1
GASTROINTESTINAL NEGATIVE: 1
CARDIOVASCULAR NEGATIVE: 1
DYSURIA: 0
COUGH: 0
WHEEZING: 0
DIARRHEA: 0
NAUSEA: 0
HEARTBURN: 0
SHORTNESS OF BREATH: 0
FEVER: 0
VOMITING: 0
ABDOMINAL PAIN: 0
CONSTIPATION: 0

## 2023-07-16 NOTE — PROGRESS NOTES
Ange Martínez is a 43 year old, presenting for the following health issues:  Vaginal Problem (Possible yeast infection pt is burning and itching, clear discharge going on for sine Friday. Pt has used otc yeast infection medication does not help. Pt has no concern of STD)  HPI   Vaginal Symptoms  Onset/Duration: 2days  Description:  Vaginal Discharge: thick   Itching (Pruritis): YES  Burning sensation:  YES  Odor: No  Accompanying Signs & Symptoms:  Urinary symptoms: No  Abdominal pain: No  Fever: No  History:   Sexually active: YES  New Partner: No  Possibility of Pregnancy:  No  Recent antibiotic use: No  Previous vaginitis issues: YES- remote hx of yeast infection  Precipitating or alleviating factors: None  Therapies tried and outcome: Monistat without any relief      Patient Active Problem List   Diagnosis     Dyspepsia     Lumbago     Chronic mixed headache syndrome     Uterine leiomyoma, unspecified location     AMA (advanced maternal age) multigravida 35+     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     Somatic dysfunction of lumbar region     Endometritis     GERD (gastroesophageal reflux disease)     Multigravida of advanced maternal age in third trimester     Morbid obesity (H)     Current Outpatient Medications   Medication     Cholecalciferol (VITAMIN D PO)     cyclobenzaprine (FLEXERIL) 10 MG tablet     diclofenac (VOLTAREN) 1 % topical gel     ferrous sulfate (FEROSUL) 325 (65 Fe) MG tablet     ibuprofen (ADVIL/MOTRIN) 600 MG tablet     omeprazole 20 MG tablet     Prenatal Vit-Iron Carbonyl-FA (PRENATABS RX) 29-1 MG TABS     No current facility-administered medications for this visit.        Allergies   Allergen Reactions     Pork Gelatin [Pork (Porcine) Protein] GI Disturbance     Review of Systems   Constitutional: Negative for chills, fatigue and fever.   Respiratory: Negative.  Negative for cough, chest tightness, shortness of breath and wheezing.    Cardiovascular: Negative.  Negative for chest pain, palpitations  and peripheral edema.   Gastrointestinal: Negative.  Negative for abdominal pain, constipation, diarrhea, heartburn, hematochezia, nausea and vomiting.   Genitourinary: Positive for vaginal discharge. Negative for dysuria, flank pain, frequency, hematuria, pelvic pain, urgency and vaginal bleeding.   All other systems reviewed and are negative.           Objective    /79 (BP Location: Left arm, Patient Position: Sitting, Cuff Size: Adult Large)   Pulse 64   Temp 98.2  F (36.8  C) (Tympanic)   Wt 95.3 kg (210 lb 1.6 oz)   SpO2 100%   BMI 36.06 kg/m    Body mass index is 36.06 kg/m .  Physical Exam  Vitals and nursing note reviewed.   Constitutional:       General: She is not in acute distress.     Appearance: Normal appearance. She is well-developed and normal weight. She is not ill-appearing.   Cardiovascular:      Rate and Rhythm: Normal rate and regular rhythm.      Pulses: Normal pulses.      Heart sounds: Normal heart sounds, S1 normal and S2 normal. No murmur heard.     No friction rub. No gallop.   Pulmonary:      Effort: Pulmonary effort is normal. No accessory muscle usage or respiratory distress.      Breath sounds: Normal breath sounds and air entry. No decreased breath sounds, wheezing, rhonchi or rales.   Abdominal:      General: Abdomen is flat. Bowel sounds are normal.      Palpations: Abdomen is soft. There is no hepatomegaly, splenomegaly or mass.      Tenderness: There is no abdominal tenderness. There is no right CVA tenderness, left CVA tenderness, guarding or rebound. Negative signs include Ugarte's sign, Rovsing's sign, McBurney's sign, psoas sign and obturator sign.      Hernia: No hernia is present.   Genitourinary:     Comments: Declined pelvic exam.  Skin:     General: Skin is warm and dry.   Neurological:      Mental Status: She is alert and oriented to person, place, and time.   Psychiatric:         Mood and Affect: Mood normal.         Behavior: Behavior normal.         Thought  Content: Thought content normal.         Judgment: Judgment normal.       Results for orders placed or performed in visit on 07/16/23 (from the past 24 hour(s))   UA Macroscopic with reflex to Microscopic and Culture - Lab Collect    Specimen: Urine, Clean Catch   Result Value Ref Range    Color Urine Yellow Colorless, Straw, Light Yellow, Yellow    Appearance Urine Clear Clear    Glucose Urine Negative Negative mg/dL    Bilirubin Urine Negative Negative    Ketones Urine Negative Negative mg/dL    Specific Gravity Urine 1.020 1.003 - 1.035    Blood Urine Small (A) Negative    pH Urine 6.5 5.0 - 7.0    Protein Albumin Urine Negative Negative mg/dL    Urobilinogen Urine 1.0 0.2, 1.0 E.U./dL    Nitrite Urine Negative Negative    Leukocyte Esterase Urine Negative Negative   Wet prep - lab collect    Specimen: Vagina; Swab   Result Value Ref Range    Trichomonas Absent Absent    Yeast Present (A) Absent    Clue Cells Absent Absent    WBCs/high power field 2+ (A) None   UA Microscopic with Reflex to Culture   Result Value Ref Range    Bacteria Urine Moderate (A) None Seen /HPF    RBC Urine 2-5 (A) 0-2 /HPF /HPF    WBC Urine 0-5 0-5 /HPF /HPF    Squamous Epithelials Urine Few (A) None Seen /LPF    Mucus Urine Present (A) None Seen /LPF    Narrative    Urine Culture not indicated            Assessment/Plan:  Candida vaginitis:  Wet prep showed yeast cells present.  Will treat with diflucan as directed.  Declined STD testing.  Avoid foreign body insertion, scented personal care products and frequent baths.  Recheck in clinic if symptoms worsen or if symptoms do not improve.    -     UA Macroscopic with reflex to Microscopic and Culture - Lab Collect; Future  -     Wet prep - lab collect; Future  -     UA Macroscopic with reflex to Microscopic and Culture - Lab Collect  -     Wet prep - lab collect  -     UA Microscopic with Reflex to Culture  -     fluconazole (DIFLUCAN) 150 MG tablet; Take 1 tablet (150 mg) by mouth once  for 1 dose May repeat after 3days if symptoms persist    Microscopic hematuria:  UA showed small blood present which may be from her recent menses.  Will send to urology as this appears to be chronic.  -     Adult Urology  Referral        Rivka Stanley PA-C

## 2023-07-21 DIAGNOSIS — M79.673 CHRONIC HEEL PAIN, UNSPECIFIED LATERALITY: ICD-10-CM

## 2023-07-21 DIAGNOSIS — G89.29 CHRONIC HEEL PAIN, UNSPECIFIED LATERALITY: ICD-10-CM

## 2023-08-26 DIAGNOSIS — R10.2 PELVIC PAIN IN FEMALE: ICD-10-CM

## 2023-08-28 RX ORDER — IBUPROFEN 600 MG/1
600 TABLET, FILM COATED ORAL EVERY 6 HOURS PRN
Qty: 30 TABLET | Refills: 0 | Status: SHIPPED | OUTPATIENT
Start: 2023-08-28 | End: 2023-09-28

## 2023-09-27 ENCOUNTER — OFFICE VISIT (OUTPATIENT)
Dept: PODIATRY | Facility: CLINIC | Age: 43
End: 2023-09-27
Payer: COMMERCIAL

## 2023-09-27 DIAGNOSIS — M76.72 PERONEAL TENDINITIS OF LEFT LOWER EXTREMITY: Primary | ICD-10-CM

## 2023-09-27 DIAGNOSIS — M72.2 PLANTAR FASCIITIS OF LEFT FOOT: ICD-10-CM

## 2023-09-27 PROCEDURE — 99214 OFFICE O/P EST MOD 30 MIN: CPT | Performed by: PODIATRIST

## 2023-09-27 NOTE — NURSING NOTE
Tanya Denny's chief complaint for this visit includes:  Chief Complaint   Patient presents with    Left Ankle - Follow Up, Pain     PCP: Mele Floyd    Referring Provider:  No referring provider defined for this encounter.    There were no vitals taken for this visit.  Data Unavailable     Do you need any medication refills at today's visit? NO    Allergies   Allergen Reactions    Pork Gelatin [Pork (Porcine) Protein] GI Disturbance       Calin Chung, EMT

## 2023-09-27 NOTE — LETTER
2023         RE: Tanya Denny  [address]  [place] [address]        Dear Colleague,    Thank you for referring your patient, Tanya Denny, to the [facility]. Please see a copy of my visit note below.    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     Somatic dysfunction of lumbar region     Endometritis     GERD (gastroesophageal reflux disease)     Multigravida of advanced maternal age in third trimester     Morbid obesity (H)     Past Surgical History:   Procedure Laterality Date     D & C       Social History     Socioeconomic History     Marital status:      Spouse name: Not on file     Number of children: Not on file     Years of education: Not on file     Highest education level: Not on file   Occupational History     Not on file   Tobacco Use     Smoking status: Never     Smokeless tobacco: Never   Vaping Use     Vaping Use: Never used   Substance and Sexual Activity     Alcohol use: No     Drug use: No     Sexual activity: Yes     Partners: Male     Birth control/protection: None   Other Topics Concern     Parent/sibling w/ CABG, MI or angioplasty before 65F 55M? No      Service No     Blood Transfusions No     Caffeine Concern No     Occupational Exposure No     Hobby Hazards No     Sleep Concern No     Stress Concern No     Weight Concern No     Special Diet Yes     Back Care No     Exercise Yes     Bike Helmet No     Seat Belt Yes     Self-Exams Yes   Social History Narrative    Mother of 4, all starting school this yearWorks in  can bring her kids there too.  is a teacher , out of work now.         How much exercise per week?  3 days     How much calcium per day? 1 serving      How much caffeine per day? 3 cups    How much vitamin D per day?  prenatals    Do you/your family wear seatbelts?  Yes    Do you/your family use safety helmets? na    Do you/your family use sunscreen?No    Do you/your family keep firearms in the home? No    Do you/your family have a smoke detector(s)? Yes        Do you feel safe in your home? Yes    Has anyone ever touched you in an unwanted manner?      Explain         June 11, 2014 Shaneka Abbasi LPN        Reviewed mikal espinoza 12-         Social Determinants of Health     Financial Resource Strain: Not on file   Food Insecurity: Not on file   Transportation Needs: Not on file   Physical Activity: Not on file   Stress: Not on file   Social Connections: Not on file   Interpersonal Safety: Not on file   Housing Stability: Not on file     Family History   Problem Relation Age of Onset     Allergies Mother         angioedema in family members unspecified     Diabetes Mother      Hypertension Mother      Diabetes Father      Hypertension Father      Diabetes Maternal Grandmother      Hypertension Maternal Grandmother      Asthma Maternal Grandmother      Diabetes Maternal Grandfather      Hypertension Maternal Grandfather      Asthma Maternal Grandfather      Cancer No family hx of      Cerebrovascular Disease No family hx of      Thyroid Disease No family hx of      Glaucoma No family hx of      Macular Degeneration No family hx of      Breast Cancer No family hx of      Colon Cancer No family hx of      Depression No family hx of      Anxiety Disorder No family hx of      Genetic Disorder No family hx of          Subjective findings- 43-year-old presents today as an add-on.  Relates to getting left heel pain for about 2 years duration, had orthotics but stopped wearing them because she thought they made it worse, seen [facility] and had x-rays that were negative in July, has done physical therapy and feels  that maybe made it worse, has been using Voltaren gel without help.    Objective findings DP PT are 2 out of 4 left.  Has pain on palpation of plantar left heel, has pain on palpation along the peroneal tendon course left.  Has mild edema along the peroneal tendon course left.  There is no erythema, no drainage, no odor, no calor, no gross tendon voids.  Reviewed x-ray results of the right and left foot 3 views foot x-rays from [facility] dated 6/2/2023 as noted in the EMR.    Assessment and plan plantar fasciitis left, peroneal tendinopathy left.  Diagnosis and treatment options discussed with the patient.  Multiligamentous ankle brace dispensed and use discussed with the patient.  MRI of the left ankle ordered and use discussed with her.  Return to clinic and see me after the MRI is complete.  Previous notes reviewed.              Moderate level of medical decision making.      Again, thank you for allowing me to participate in the care of your patient.        Sincerely,        Des Mckeon DPM

## 2023-09-28 DIAGNOSIS — R10.2 PELVIC PAIN IN FEMALE: ICD-10-CM

## 2023-09-28 RX ORDER — IBUPROFEN 600 MG/1
600 TABLET, FILM COATED ORAL EVERY 6 HOURS PRN
Qty: 30 TABLET | Refills: 0 | Status: SHIPPED | OUTPATIENT
Start: 2023-09-28 | End: 2023-12-12

## 2023-10-06 ENCOUNTER — TELEPHONE (OUTPATIENT)
Dept: SURGERY | Facility: CLINIC | Age: 43
End: 2023-10-06
Payer: COMMERCIAL

## 2023-10-06 NOTE — TELEPHONE ENCOUNTER
Call placed to patient to inform her our team is looking into available appointment dates/times with Dr. Resendez.  Per Dr. Lundy's recommendation from July 2023 visit, patient to be scheduled with Dr. Resendez for further evaluation.    Patient appreciated call and will await team to confirm time and date of appointment with Dr. Resendez.    Miranda Lira RN on 10/6/2023 at 4:19 PM

## 2023-10-10 ENCOUNTER — TELEPHONE (OUTPATIENT)
Dept: SURGERY | Facility: CLINIC | Age: 43
End: 2023-10-10
Payer: COMMERCIAL

## 2023-10-10 DIAGNOSIS — N64.4 MASTODYNIA OF LEFT BREAST: Primary | ICD-10-CM

## 2023-10-10 NOTE — TELEPHONE ENCOUNTER
Call placed to patient to offer appointment in an open spot on 's clinic on 10/17 at 12:30pm . Pt declined. RN offered next available opening with  on 11/1 and pt states this will work good for her. Appt scheduled. Pt aware of appt details. Referral placed and in the referral note entered that pt is scheduled and most recent Imaging in spring of 2023 is in the system ..Oxana Tony RN

## 2023-10-11 ENCOUNTER — ANCILLARY PROCEDURE (OUTPATIENT)
Dept: MRI IMAGING | Facility: CLINIC | Age: 43
End: 2023-10-11
Attending: PODIATRIST
Payer: COMMERCIAL

## 2023-10-11 DIAGNOSIS — M76.72 PERONEAL TENDINITIS OF LEFT LOWER EXTREMITY: ICD-10-CM

## 2023-10-11 DIAGNOSIS — M72.2 PLANTAR FASCIITIS OF LEFT FOOT: ICD-10-CM

## 2023-10-11 PROCEDURE — 73721 MRI JNT OF LWR EXTRE W/O DYE: CPT | Mod: LT | Performed by: RADIOLOGY

## 2023-10-20 ENCOUNTER — OFFICE VISIT (OUTPATIENT)
Dept: PODIATRY | Facility: CLINIC | Age: 43
End: 2023-10-20
Payer: COMMERCIAL

## 2023-10-20 DIAGNOSIS — M76.72 PERONEAL TENDINITIS OF LEFT LOWER EXTREMITY: ICD-10-CM

## 2023-10-20 DIAGNOSIS — R60.0 PERIPHERAL EDEMA: Primary | ICD-10-CM

## 2023-10-20 DIAGNOSIS — M72.2 PLANTAR FASCIITIS OF LEFT FOOT: ICD-10-CM

## 2023-10-20 PROCEDURE — 99213 OFFICE O/P EST LOW 20 MIN: CPT | Performed by: PODIATRIST

## 2023-10-20 NOTE — PROGRESS NOTES
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    Somatic dysfunction of lumbar region    Endometritis    GERD (gastroesophageal reflux disease)    Multigravida of advanced maternal age in third trimester    Morbid obesity (H)     Past Surgical History:   Procedure Laterality Date    D & C       Social History     Socioeconomic History    Marital status:      Spouse name: Not on file    Number of children: Not on file    Years of education: Not on file    Highest education level: Not on file   Occupational History    Not on file   Tobacco Use    Smoking status: Never    Smokeless tobacco: Never   Vaping Use    Vaping Use: Never used   Substance and Sexual Activity    Alcohol use: No    Drug use: No    Sexual activity: Yes     Partners: Male     Birth control/protection: None   Other Topics Concern    Parent/sibling w/ CABG, MI or angioplasty before 65F 55M? No     Service No    Blood Transfusions No    Caffeine Concern No    Occupational Exposure No    Hobby Hazards No    Sleep Concern No    Stress Concern No    Weight Concern No    Special Diet Yes    Back Care No    Exercise Yes    Bike Helmet No    Seat Belt Yes    Self-Exams Yes   Social History Narrative    Mother of 4, all starting school this yearWorks in  can bring her kids there too.  is a teacher , out of work now.         How much exercise per week? 3 days     How much calcium per day? 1 serving      How much caffeine per day? 3 cups    How much vitamin D per day?  prenatals    Do you/your family wear seatbelts?  Yes    Do you/your family use safety helmets? na    Do you/your family use sunscreen?No    Do you/your family keep firearms in the home? No    Do  you/your family have a smoke detector(s)? Yes        Do you feel safe in your home? Yes    Has anyone ever touched you in an unwanted manner?      Explain         June 11, 2014 Shaneka Abbasi LPN        Reviewed mikal espinoza 12-         Social Determinants of Health     Financial Resource Strain: Not on file   Food Insecurity: Not on file   Transportation Needs: Not on file   Physical Activity: Not on file   Stress: Not on file   Social Connections: Not on file   Interpersonal Safety: Not on file   Housing Stability: Not on file     Family History   Problem Relation Age of Onset    Allergies Mother         angioedema in family members unspecified    Diabetes Mother     Hypertension Mother     Diabetes Father     Hypertension Father     Diabetes Maternal Grandmother     Hypertension Maternal Grandmother     Asthma Maternal Grandmother     Diabetes Maternal Grandfather     Hypertension Maternal Grandfather     Asthma Maternal Grandfather     Cancer No family hx of     Cerebrovascular Disease No family hx of     Thyroid Disease No family hx of     Glaucoma No family hx of     Macular Degeneration No family hx of     Breast Cancer No family hx of     Colon Cancer No family hx of     Depression No family hx of     Anxiety Disorder No family hx of     Genetic Disorder No family hx of        Subjective findings- 43-year-old returns clinic for plantar Fasciitis with Peroneal tendinopathy left.  Relates it still hurts on the left heel and along the peroneal tendons to a lesser degree, relates to wearing ankle brace, did not feel that that helped and it swelled above it so she stopped wearing it, relates she used the Voltaren gel and that did not help, she has done physical therapy in the past, relates she did have surgical release of the plantar fascia on the right foot and that helped and had it done twice on the left foot and once was about 2 years ago and once was about a year ago and that did not help, relates she is  on her feet at work, relates she started doing acupuncture.    Objective findings- Vascular status is intact left.  Has mild edema along the peroneal tendon course left, no erythema, no drainage, relates the pain is on the plantar heel and the peroneal tendon course to a lesser degree.  MRI of the left ankle from 10/11/2023 reviewed films and report with patient in clinic today.    Assessment and plan- Plantar Fasciitis left, Peroneal tendinopathy left.  Peripheral edema left.  Diagnosis and treatment options discussed with the patient.  Cam boot dispensed and use discussed with the patient.  I discussed with her physical therapy again we will hold on this today.  Prescription for compression socks 15 to 20 mmHg given and use discussed with the patient.  She is getting mild peripheral edema.  Return to clinic and see me in 3 weeks.  Previous notes reviewed.        Moderate level of medical decision making.

## 2023-10-20 NOTE — LETTER
10/20/2023         RE: Tanya Denny  [address]  [place] [address]        Dear Colleague,    Thank you for referring your patient, Tanya Denny, to the [facility]. Please see a copy of my visit note below.    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     Somatic dysfunction of lumbar region     Endometritis     GERD (gastroesophageal reflux disease)     Multigravida of advanced maternal age in third trimester     Morbid obesity (H)     Past Surgical History:   Procedure Laterality Date     D & C       Social History     Socioeconomic History     Marital status:      Spouse name: Not on file     Number of children: Not on file     Years of education: Not on file     Highest education level: Not on file   Occupational History     Not on file   Tobacco Use     Smoking status: Never     Smokeless tobacco: Never   Vaping Use     Vaping Use: Never used   Substance and Sexual Activity     Alcohol use: No     Drug use: No     Sexual activity: Yes     Partners: Male     Birth control/protection: None   Other Topics Concern     Parent/sibling w/ CABG, MI or angioplasty before 65F 55M? No      Service No     Blood Transfusions No     Caffeine Concern No     Occupational Exposure No     Hobby Hazards No     Sleep Concern No     Stress Concern No     Weight Concern No     Special Diet Yes     Back Care No     Exercise Yes     Bike Helmet No     Seat Belt Yes     Self-Exams Yes   Social History Narrative    Mother of 4, all starting school this yearWorks in  can bring her kids there too.  is a teacher , out of work now.         How much exercise per week?  3 days     How much calcium per day? 1 serving      How much caffeine per day? 3 cups    How much vitamin D per day?  prenatals    Do you/your family wear seatbelts?  Yes    Do you/your family use safety helmets? na    Do you/your family use sunscreen?No    Do you/your family keep firearms in the home? No    Do you/your family have a smoke detector(s)? Yes        Do you feel safe in your home? Yes    Has anyone ever touched you in an unwanted manner?      Explain         June 11, 2014 Shaneka Abbasi LPN        Reviewed mikal espinoza 12-         Social Determinants of Health     Financial Resource Strain: Not on file   Food Insecurity: Not on file   Transportation Needs: Not on file   Physical Activity: Not on file   Stress: Not on file   Social Connections: Not on file   Interpersonal Safety: Not on file   Housing Stability: Not on file     Family History   Problem Relation Age of Onset     Allergies Mother         angioedema in family members unspecified     Diabetes Mother      Hypertension Mother      Diabetes Father      Hypertension Father      Diabetes Maternal Grandmother      Hypertension Maternal Grandmother      Asthma Maternal Grandmother      Diabetes Maternal Grandfather      Hypertension Maternal Grandfather      Asthma Maternal Grandfather      Cancer No family hx of      Cerebrovascular Disease No family hx of      Thyroid Disease No family hx of      Glaucoma No family hx of      Macular Degeneration No family hx of      Breast Cancer No family hx of      Colon Cancer No family hx of      Depression No family hx of      Anxiety Disorder No family hx of      Genetic Disorder No family hx of        Subjective findings- 43-year-old returns clinic for plantar Fasciitis with Peroneal tendinopathy left.  Relates it still hurts on the left heel and along the peroneal tendons to a lesser degree, relates to wearing ankle brace, did not feel that that helped and it swelled above it so she stopped wearing it,  relates she used the Voltaren gel and that did not help, she has done physical therapy in the past, relates she did have surgical release of the plantar fascia on the right foot and that helped and had it done twice on the left foot and once was about 2 years ago and once was about a year ago and that did not help, relates she is on her feet at work, relates she started doing acupuncture.    Objective findings- Vascular status is intact left.  Has mild edema along the peroneal tendon course left, no erythema, no drainage, relates the pain is on the plantar heel and the peroneal tendon course to a lesser degree.  MRI of the left ankle from 10/11/2023 reviewed films and report with patient in clinic today.    Assessment and plan- Plantar Fasciitis left, Peroneal tendinopathy left.  Peripheral edema left.  Diagnosis and treatment options discussed with the patient.  Cam boot dispensed and use discussed with the patient.  I discussed with her physical therapy again we will hold on this today.  Prescription for compression socks 15 to 20 mmHg given and use discussed with the patient.  She is getting mild peripheral edema.  Return to clinic and see me in 3 weeks.  Previous notes reviewed.        Moderate level of medical decision making.      Again, thank you for allowing me to participate in the care of your patient.        Sincerely,        Des Mckeon DPM

## 2023-11-01 ENCOUNTER — OFFICE VISIT (OUTPATIENT)
Dept: SURGERY | Facility: CLINIC | Age: 43
End: 2023-11-01
Attending: STUDENT IN AN ORGANIZED HEALTH CARE EDUCATION/TRAINING PROGRAM
Payer: COMMERCIAL

## 2023-11-01 DIAGNOSIS — N64.4 MASTODYNIA OF LEFT BREAST: Primary | ICD-10-CM

## 2023-11-01 PROCEDURE — 99214 OFFICE O/P EST MOD 30 MIN: CPT | Performed by: SURGERY

## 2023-11-01 NOTE — PROGRESS NOTES
NEW SURGICAL CONSULTATION  2023    Tanya Denny is a 43 year old woman who presents with a left  breast complaint.  She was referred by Sonido Lundy MD.    HPI:    She noted left axillary tissue for about a decade. It was painful in ; she had a cardiac work up at that time which was negative. She was pregnant at that time.  Since then she continues to have pain, through 3 pregnancies and breastfeeding 2 children. The pain has been persistent for the past 2 days or so.  She tried a smaller bra for a couple of days, which made it worse.  No significant issue on the right. She has not taken any pain medication for the pain.    She stopped breastfeeding 2023.    Imaging in 2023 showed asymmetry in the upper outer LEFT breast. Diagnostic imaging in March showed a benign-appearing intramammary lymph node and subcentimeter cysts.    A biopsy was not performed given the benign findings.      In , she underwent a left axillary US at [facility] that showed normal axillary fat and perhaps a small amount of accessory breast tissue.    BREAST-SPECIFIC HISTORY:  Prior breast surgeries: No  Prior radiation history: No  Bra size: sports bra  Dominant hand: Right    FAMILY HISTORY:  Breast ca: No  Ovarian ca: No  Other cancer: No    Patient Active Problem List   Diagnosis    Dyspepsia    Lumbago    Chronic mixed headache syndrome    Uterine leiomyoma, unspecified location    AMA (advanced maternal age) multigravida 35+    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    Somatic dysfunction of lumbar region    Endometritis    GERD (gastroesophageal reflux disease)    Multigravida of advanced maternal age in third trimester    Morbid obesity (H)    No DM    Past Medical History:   Diagnosis Date    Migraine     SAB (spontaneous )     1ST TRIMESTER       Past Surgical History:   Procedure Laterality Date    D & C     No GA issues    Current Outpatient Medications   Medication Sig Dispense Refill     Cholecalciferol (VITAMIN D PO) Take 1,000 Units by mouth daily      cyclobenzaprine (FLEXERIL) 10 MG tablet Take 1 tablet (10 mg) by mouth nightly as needed for muscle spasms 45 tablet 1    ferrous sulfate (FEROSUL) 325 (65 Fe) MG tablet TAKE 1 TABLET BY MOUTH EVERY DAY WITH BREAKFAST      omeprazole 20 MG tablet Take 1 tablet (20 mg) by mouth daily 90 tablet 3    Prenatal Vit-Iron Carbonyl-FA (PRENATABS RX) 29-1 MG TABS TAKE 1 TABLET BY MOUTH EVERY DAY 30 tablet 23    COMPRESSION STOCKINGS 1 each by Device route daily for 30 days 1 each 2    diclofenac (VOLTAREN) 1 % topical gel Apply 2 g topically 4 times daily (Patient not taking: Reported on 11/1/2023) 350 g 0    ibuprofen (ADVIL/MOTRIN) 600 MG tablet TAKE 1 TABLET (600 MG) BY MOUTH EVERY 6 HOURS AS NEEDED FOR MODERATE PAIN (4-6) (Patient not taking: Reported on 11/1/2023) 30 tablet 0    Takes advil for foot pain       Allergies   Allergen Reactions    Pork Gelatin [Pork (Porcine) Protein] GI Disturbance        SOCIAL HISTORY:  Occupation:     ROS:  Easy bruising/bleeding: No  History of DVT/PE: No     There were no vitals taken for this visit.   Physical Exam  Constitutional:       Appearance: She is well-developed.   Chest:   Breasts:     Breasts are symmetrical.      Right: No inverted nipple, mass, nipple discharge, skin change or tenderness.      Left: No inverted nipple, mass, nipple discharge, skin change or tenderness.          Comments: Patient was examined in both supine and upright positions.   Lymphadenopathy:      Cervical: No cervical adenopathy.      Right cervical: No superficial, deep or posterior cervical adenopathy.     Left cervical: No superficial, deep or posterior cervical adenopathy.      Upper Body:      Right upper body: No supraclavicular, axillary or pectoral adenopathy.      Left upper body: No supraclavicular, axillary or pectoral adenopathy.      Comments: No lymphedema in bilateral upper extremities.   Skin:      General: Skin is warm and dry.          INVESTIGATIONS:    Diagnostic Mammogram & Ultrasound (3/17/2023) showed:  BREAST DENSITY: Scattered fibroglandular densities.  Findings:     Mammogram:  Additional mammographic views of the left breast were performed for further evaluation of possible asymmetry seen on screening mammogram 2/2/2023. On additional views, the finding persists and has the appearance of normal breast tissue. Additionally there is a lymph node at 3:00, anterior depth.  Ultrasound:  Targeted ultrasound evaluation was performed by the technologist and radiologist. Ultrasound of the left breast demonstrates a benign appearing intramammary lymph node with an adjacent subcentimeter cysts at 1:30 position 5 cm from nipple (at real-time ultrasound at the 3:00 position). No suspicious findings in the left upper outer quadrant.  IMPRESSION: BI-RADS CATEGORY: 2 - Benign.    Screening Mammogram (2/2/2023) showed:  Findings: The breasts have scattered areas of fibroglandular density.  There is a possible asymmetry in the left breast in the upper outer quadrant, middle depth.  The remainder of the breast tissue is unremarkable.  There is no suspicious finding of the right breast.  IMPRESSION: BI-RADS CATEGORY: 0 - Incomplete - Need Additional Imaging    ASSESSMENT:  Tanya Denny is a 43 year old woman with LEFT axillary tail breast pain.    I personally reviewed the imaging above. She has not has specific LEFT axillary imaging. We discussed that sometimes excision of accessory tissue may help but excision of breast tissue with more diffuse pain typically is not helpful.  I recommend a LEFT axillary US to evaluate the presence and extent of accessory tissue.    Surgery is not indicated for pain without an anatomic abnormality, such as accessory tissue.  In the meantime, we reviewed the conservative management approaches to breast pain, including a well-fitting supportive bra, caffeine cessation, oral  NSAIDs, and warm compresses.  She can also try topical NSAIDs (voltaren 1% gel) if the other measures fail, which she already has for her foot.     All of the above was discussed with Tanya Denny and all questions were answered.  She elected to proceed with  LEFT   axillary US.    Total time spent with the patient was 30 minutes, of which 75% was counseling.     PLAN:  LEFT axillary US  Follow up with me after US    Anais Resendez MD MS [facility] FACS  Associate Professor of Surgery  Division of Surgical Oncology  [facility]     35 minutes spent on the date of the encounter doing chart review, review of outside records, review of test result(s), interpretation of test(s), patient visit, documentation, and care coordination.

## 2023-11-01 NOTE — NURSING NOTE
Tanya Denny's goals for this visit include:   Chief Complaint   Patient presents with    Consult     Accessory breast tissue left breast       She requests these members of her care team be copied on today's visit information: Yes    PCP: Mele Floyd    Referring Provider:  Sonido Lundy MD  [facility]  [address] [place] [address]    There were no vitals taken for this visit.    Do you need any medication refills at today's visit? No    Doris Negrete LPN

## 2023-11-01 NOTE — NURSING NOTE
Per  pt to be scheduled for a Left Axillary US and follow up appt with her after US. Procedure  was not available in clinic at the present time so RN sent a message to the scheduling team to help with this. RN updated patient in clinic that she will be contacted by a team member to schedule her US and Follow up Dipti appointment. Pt voiced understanding and thanked RN for the help..Oxana Tony RN

## 2023-11-01 NOTE — LETTER
11/1/2023         RE: Tanya Denny  [address]  [place] [address]        Dear Colleague,    Thank you for referring your patient, Tanya Denny, to the [facility]. Please see a copy of my visit note below.    NEW SURGICAL CONSULTATION  Nov 1, 2023    Tanya Denny is a 43 year old woman who presents with a left  breast complaint.  She was referred by Sonido Lundy MD.    HPI:    She noted left axillary tissue for about a decade. It was painful in 2019; she had a cardiac work up at that time which was negative. She was pregnant at that time.  Since then she continues to have pain, through 3 pregnancies and breastfeeding 2 children. The pain has been persistent for the past 2 days or so.  She tried a smaller bra for a couple of days, which made it worse.  No significant issue on the right. She has not taken any pain medication for the pain.    She stopped breastfeeding April 2023.    Imaging in March 2023 showed asymmetry in the upper outer LEFT breast. Diagnostic imaging in March showed a benign-appearing intramammary lymph node and subcentimeter cysts.    A biopsy was not performed given the benign findings.      In 2020, she underwent a left axillary US at [facility] that showed normal axillary fat and perhaps a small amount of accessory breast tissue.    BREAST-SPECIFIC HISTORY:  Prior breast surgeries: No  Prior radiation history: No  Bra size: sports bra  Dominant hand: Right    FAMILY HISTORY:  Breast ca: No  Ovarian ca: No  Other cancer: No    Patient Active Problem List   Diagnosis     Dyspepsia     Lumbago     Chronic mixed headache syndrome     Uterine leiomyoma, unspecified location     AMA (advanced maternal age) multigravida 35+     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     Somatic dysfunction of lumbar region     Endometritis     GERD (gastroesophageal reflux disease)     Multigravida of advanced maternal age in third trimester     Morbid  obesity (H)    No DM    Past Medical History:   Diagnosis Date     Migraine      SAB (spontaneous )     1ST TRIMESTER       Past Surgical History:   Procedure Laterality Date     D & C     No GA issues    Current Outpatient Medications   Medication Sig Dispense Refill     Cholecalciferol (VITAMIN D PO) Take 1,000 Units by mouth daily       cyclobenzaprine (FLEXERIL) 10 MG tablet Take 1 tablet (10 mg) by mouth nightly as needed for muscle spasms 45 tablet 1     ferrous sulfate (FEROSUL) 325 (65 Fe) MG tablet TAKE 1 TABLET BY MOUTH EVERY DAY WITH BREAKFAST       omeprazole 20 MG tablet Take 1 tablet (20 mg) by mouth daily 90 tablet 3     Prenatal Vit-Iron Carbonyl-FA (PRENATABS RX) 29-1 MG TABS TAKE 1 TABLET BY MOUTH EVERY DAY 30 tablet 23     COMPRESSION STOCKINGS 1 each by Device route daily for 30 days 1 each 2     diclofenac (VOLTAREN) 1 % topical gel Apply 2 g topically 4 times daily (Patient not taking: Reported on 2023) 350 g 0     ibuprofen (ADVIL/MOTRIN) 600 MG tablet TAKE 1 TABLET (600 MG) BY MOUTH EVERY 6 HOURS AS NEEDED FOR MODERATE PAIN (4-6) (Patient not taking: Reported on 2023) 30 tablet 0    Takes advil for foot pain       Allergies   Allergen Reactions     Pork Gelatin [Pork (Porcine) Protein] GI Disturbance        SOCIAL HISTORY:  Occupation:     ROS:  Easy bruising/bleeding: No  History of DVT/PE: No     There were no vitals taken for this visit.   Physical Exam  Constitutional:       Appearance: She is well-developed.   Chest:   Breasts:     Breasts are symmetrical.      Right: No inverted nipple, mass, nipple discharge, skin change or tenderness.      Left: No inverted nipple, mass, nipple discharge, skin change or tenderness.          Comments: Patient was examined in both supine and upright positions.   Lymphadenopathy:      Cervical: No cervical adenopathy.      Right cervical: No superficial, deep or posterior cervical adenopathy.     Left cervical: No  superficial, deep or posterior cervical adenopathy.      Upper Body:      Right upper body: No supraclavicular, axillary or pectoral adenopathy.      Left upper body: No supraclavicular, axillary or pectoral adenopathy.      Comments: No lymphedema in bilateral upper extremities.   Skin:     General: Skin is warm and dry.          INVESTIGATIONS:    Diagnostic Mammogram & Ultrasound (3/17/2023) showed:  BREAST DENSITY: Scattered fibroglandular densities.  Findings:     Mammogram:  Additional mammographic views of the left breast were performed for further evaluation of possible asymmetry seen on screening mammogram 2/2/2023. On additional views, the finding persists and has the appearance of normal breast tissue. Additionally there is a lymph node at 3:00, anterior depth.  Ultrasound:  Targeted ultrasound evaluation was performed by the technologist and radiologist. Ultrasound of the left breast demonstrates a benign appearing intramammary lymph node with an adjacent subcentimeter cysts at 1:30 position 5 cm from nipple (at real-time ultrasound at the 3:00 position). No suspicious findings in the left upper outer quadrant.  IMPRESSION: BI-RADS CATEGORY: 2 - Benign.    Screening Mammogram (2/2/2023) showed:  Findings: The breasts have scattered areas of fibroglandular density.  There is a possible asymmetry in the left breast in the upper outer quadrant, middle depth.  The remainder of the breast tissue is unremarkable.  There is no suspicious finding of the right breast.  IMPRESSION: BI-RADS CATEGORY: 0 - Incomplete - Need Additional Imaging    ASSESSMENT:  Tanya Denny is a 43 year old woman with LEFT axillary tail breast pain.    I personally reviewed the imaging above. She has not has specific LEFT axillary imaging. We discussed that sometimes excision of accessory tissue may help but excision of breast tissue with more diffuse pain typically is not helpful.  I recommend a LEFT axillary US to evaluate  the presence and extent of accessory tissue.    Surgery is not indicated for pain without an anatomic abnormality, such as accessory tissue.  In the meantime, we reviewed the conservative management approaches to breast pain, including a well-fitting supportive bra, caffeine cessation, oral NSAIDs, and warm compresses.  She can also try topical NSAIDs (voltaren 1% gel) if the other measures fail, which she already has for her foot.     All of the above was discussed with Tanya Denny and all questions were answered.  She elected to proceed with  LEFT   axillary US.    Total time spent with the patient was 30 minutes, of which 75% was counseling.     PLAN:  LEFT axillary US  Follow up with me after US    Anais Resendez MD MS [facility] FACS  Associate Professor of Surgery  Division of Surgical Oncology  [facility]     35 minutes spent on the date of the encounter doing chart review, review of outside records, review of test result(s), interpretation of test(s), patient visit, documentation, and care coordination.      Again, thank you for allowing me to participate in the care of your patient.        Sincerely,        Anais Resendez MD

## 2023-11-16 ENCOUNTER — OFFICE VISIT (OUTPATIENT)
Dept: PODIATRY | Facility: CLINIC | Age: 43
End: 2023-11-16
Payer: COMMERCIAL

## 2023-11-16 DIAGNOSIS — M72.2 PLANTAR FASCIITIS OF LEFT FOOT: Primary | ICD-10-CM

## 2023-11-16 DIAGNOSIS — M76.72 PERONEAL TENDINITIS OF LEFT LOWER EXTREMITY: ICD-10-CM

## 2023-11-16 PROCEDURE — 99214 OFFICE O/P EST MOD 30 MIN: CPT | Performed by: PODIATRIST

## 2023-11-16 RX ORDER — DEXAMETHASONE SODIUM PHOSPHATE 4 MG/ML
INJECTION, SOLUTION INTRA-ARTICULAR; INTRALESIONAL; INTRAMUSCULAR; INTRAVENOUS; SOFT TISSUE
Qty: 30 ML | Refills: 0 | Status: SHIPPED | OUTPATIENT
Start: 2023-11-16 | End: 2024-01-09

## 2023-11-16 RX ORDER — ETODOLAC 500 MG
500 TABLET ORAL 2 TIMES DAILY
Qty: 60 TABLET | Refills: 0 | Status: SHIPPED | OUTPATIENT
Start: 2023-11-16 | End: 2023-12-12

## 2023-11-16 NOTE — NURSING NOTE
Tanya Denny's chief complaint for this visit includes:  Chief Complaint   Patient presents with    Consult     3 week follow up for foot pain, patient states the pain has not improved     PCP: Mele Floyd    Referring Provider:  No referring provider defined for this encounter.    There were no vitals taken for this visit.  Data Unavailable     Do you need any medication refills at today's visit? NO    Allergies   Allergen Reactions    Pork Gelatin [Pork (Porcine) Protein] GI Disturbance       Fadumo Walton LPN

## 2023-11-16 NOTE — PROGRESS NOTES
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    Somatic dysfunction of lumbar region    Endometritis    GERD (gastroesophageal reflux disease)    Multigravida of advanced maternal age in third trimester    Morbid obesity (H)     Past Surgical History:   Procedure Laterality Date    D & C       Social History     Socioeconomic History    Marital status:      Spouse name: Not on file    Number of children: Not on file    Years of education: Not on file    Highest education level: Not on file   Occupational History    Not on file   Tobacco Use    Smoking status: Never    Smokeless tobacco: Never   Vaping Use    Vaping Use: Never used   Substance and Sexual Activity    Alcohol use: No    Drug use: No    Sexual activity: Yes     Partners: Male     Birth control/protection: None   Other Topics Concern    Parent/sibling w/ CABG, MI or angioplasty before 65F 55M? No     Service No    Blood Transfusions No    Caffeine Concern No    Occupational Exposure No    Hobby Hazards No    Sleep Concern No    Stress Concern No    Weight Concern No    Special Diet Yes    Back Care No    Exercise Yes    Bike Helmet No    Seat Belt Yes    Self-Exams Yes   Social History Narrative    Mother of 4, all starting school this yearWorks in  can bring her kids there too.  is a teacher , out of work now.         How much exercise per week? 3 days     How much calcium per day? 1 serving      How much caffeine per day? 3 cups    How much vitamin D per day?  prenatals    Do you/your family wear seatbelts?  Yes    Do you/your family use safety helmets? na    Do you/your family use sunscreen?No    Do you/your family keep firearms in the home? No    Do  you/your family have a smoke detector(s)? Yes        Do you feel safe in your home? Yes    Has anyone ever touched you in an unwanted manner?      Explain         June 11, 2014 Shaneka Abbasi LPN        Reviewed mikal espinoza 12-         Social Determinants of Health     Financial Resource Strain: Not on file   Food Insecurity: Not on file   Transportation Needs: Not on file   Physical Activity: Not on file   Stress: Not on file   Social Connections: Not on file   Interpersonal Safety: Not on file   Housing Stability: Not on file     Family History   Problem Relation Age of Onset    Allergies Mother         angioedema in family members unspecified    Diabetes Mother     Hypertension Mother     Diabetes Father     Hypertension Father     Diabetes Maternal Grandmother     Hypertension Maternal Grandmother     Asthma Maternal Grandmother     Diabetes Maternal Grandfather     Hypertension Maternal Grandfather     Asthma Maternal Grandfather     Cancer No family hx of     Cerebrovascular Disease No family hx of     Thyroid Disease No family hx of     Glaucoma No family hx of     Macular Degeneration No family hx of     Breast Cancer No family hx of     Colon Cancer No family hx of     Depression No family hx of     Anxiety Disorder No family hx of     Genetic Disorder No family hx of            Subjective findings- 43-year-old returns clinic for plantar fasciitis and peroneal tendinopathy left.  Relates it still hurts a lot.  Relates she wore the cam boot in the first week it worked really well and then the second week and helped lessen the third week did not help at all so she stopped wearing it.  Relates that hurts in the plantar heel.    Objective findings- DP and PT are 2 out of 4 left.  Has pain on palpation the plantar left heel.  There is no erythema, no drainage, no odor, no calor, no gross tendon voids.  Previous MRI reviewed.    Assessment and plan- Plantar Fasciitis left, Peroneal tendinopathy left.   Peripheral edema is doing well.  Diagnosis and treatment options discussed with the patient.  Prescription for physical therapy with iontophoresis given use discussed with the patient.  She relates she did physical therapy in the past but did not do iontophoresis.  She relates she was taking ibuprofen and that helped but she ran out.  Prescription for Lodine given and use discussed with her.  Advised her to discontinue the ibuprofen.  Previous notes reviewed.  Return to clinic and see me in 1 month.              Moderate level of medical decision making.

## 2023-11-16 NOTE — LETTER
2023         RE: Tanya Denny  [address]  [place] [address]        Dear Colleague,    Thank you for referring your patient, Tanya Denny, to the [facility]. Please see a copy of my visit note below.    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     Somatic dysfunction of lumbar region     Endometritis     GERD (gastroesophageal reflux disease)     Multigravida of advanced maternal age in third trimester     Morbid obesity (H)     Past Surgical History:   Procedure Laterality Date     D & C       Social History     Socioeconomic History     Marital status:      Spouse name: Not on file     Number of children: Not on file     Years of education: Not on file     Highest education level: Not on file   Occupational History     Not on file   Tobacco Use     Smoking status: Never     Smokeless tobacco: Never   Vaping Use     Vaping Use: Never used   Substance and Sexual Activity     Alcohol use: No     Drug use: No     Sexual activity: Yes     Partners: Male     Birth control/protection: None   Other Topics Concern     Parent/sibling w/ CABG, MI or angioplasty before 65F 55M? No      Service No     Blood Transfusions No     Caffeine Concern No     Occupational Exposure No     Hobby Hazards No     Sleep Concern No     Stress Concern No     Weight Concern No     Special Diet Yes     Back Care No     Exercise Yes     Bike Helmet No     Seat Belt Yes     Self-Exams Yes   Social History Narrative    Mother of 4, all starting school this yearWorks in  can bring her kids there too.  is a teacher , out of work now.         How much exercise per week?  3 days     How much calcium per day? 1 serving      How much caffeine per day? 3 cups    How much vitamin D per day?  prenatals    Do you/your family wear seatbelts?  Yes    Do you/your family use safety helmets? na    Do you/your family use sunscreen?No    Do you/your family keep firearms in the home? No    Do you/your family have a smoke detector(s)? Yes        Do you feel safe in your home? Yes    Has anyone ever touched you in an unwanted manner?      Explain         June 11, 2014 Shaneka Abbasi LPN        Reviewed mikal espinoza 12-         Social Determinants of Health     Financial Resource Strain: Not on file   Food Insecurity: Not on file   Transportation Needs: Not on file   Physical Activity: Not on file   Stress: Not on file   Social Connections: Not on file   Interpersonal Safety: Not on file   Housing Stability: Not on file     Family History   Problem Relation Age of Onset     Allergies Mother         angioedema in family members unspecified     Diabetes Mother      Hypertension Mother      Diabetes Father      Hypertension Father      Diabetes Maternal Grandmother      Hypertension Maternal Grandmother      Asthma Maternal Grandmother      Diabetes Maternal Grandfather      Hypertension Maternal Grandfather      Asthma Maternal Grandfather      Cancer No family hx of      Cerebrovascular Disease No family hx of      Thyroid Disease No family hx of      Glaucoma No family hx of      Macular Degeneration No family hx of      Breast Cancer No family hx of      Colon Cancer No family hx of      Depression No family hx of      Anxiety Disorder No family hx of      Genetic Disorder No family hx of            Subjective findings- 43-year-old returns clinic for plantar fasciitis and peroneal tendinopathy left.  Relates it still hurts a lot.  Relates she wore the cam boot in the first week it worked really well and then the second week and helped lessen the third week did not help at all so she stopped  wearing it.  Relates that hurts in the plantar heel.    Objective findings- DP and PT are 2 out of 4 left.  Has pain on palpation the plantar left heel.  There is no erythema, no drainage, no odor, no calor, no gross tendon voids.  Previous MRI reviewed.    Assessment and plan- Plantar Fasciitis left, Peroneal tendinopathy left.  Peripheral edema is doing well.  Diagnosis and treatment options discussed with the patient.  Prescription for physical therapy with iontophoresis given use discussed with the patient.  She relates she did physical therapy in the past but did not do iontophoresis.  She relates she was taking ibuprofen and that helped but she ran out.  Prescription for Lodine given and use discussed with her.  Advised her to discontinue the ibuprofen.  Previous notes reviewed.  Return to clinic and see me in 1 month.              Moderate level of medical decision making.      Again, thank you for allowing me to participate in the care of your patient.        Sincerely,        Des Mckeon DPM

## 2023-11-21 ENCOUNTER — THERAPY VISIT (OUTPATIENT)
Dept: PHYSICAL THERAPY | Facility: CLINIC | Age: 43
End: 2023-11-21
Attending: PODIATRIST
Payer: COMMERCIAL

## 2023-11-21 DIAGNOSIS — M25.572 PAIN IN JOINT INVOLVING ANKLE AND FOOT, LEFT: Primary | ICD-10-CM

## 2023-11-21 PROCEDURE — 97110 THERAPEUTIC EXERCISES: CPT | Mod: GP | Performed by: PHYSICAL THERAPIST

## 2023-11-21 PROCEDURE — 97161 PT EVAL LOW COMPLEX 20 MIN: CPT | Mod: GP | Performed by: PHYSICAL THERAPIST

## 2023-11-21 PROCEDURE — 97140 MANUAL THERAPY 1/> REGIONS: CPT | Mod: GP | Performed by: PHYSICAL THERAPIST

## 2023-11-21 NOTE — PROGRESS NOTES
PHYSICAL THERAPY EVALUATION  Type of Visit: Evaluation    See electronic medical record for Abuse and Falls Screening details.    Subjective       Presenting condition or subjective complaint: L plantar foot pain and peroneal tendonitis per Dr. Mckeon. Pain has been present for about two years with no specific injuries. Wore boot for 3 weeks than helped left foot slightly but now made her right foot hurt more. She has lost some weight this month to try and help the pain but has not noticed anything different.  Date of onset: 11/16/23 (chronic (date of order))    Relevant medical history: -- (none listed by patient)   Dates & types of surgery: none    Prior diagnostic imaging/testing results: CT scan; X-ray     Prior therapy history for the same diagnosis, illness or injury: No      Prior Level of Function  Transfers:   Ambulation:   ADL:   IADL:     Living Environment  Social support:     Type of home:     Stairs to enter the home:         Ramp:     Stairs inside the home:         Help at home:    Equipment owned:       Employment:    [employer] (lots of standing)  Hobbies/Interests:      Patient goals for therapy:      Pain assessment: See objective evaluation for additional pain details     Objective   FOOT/ANKLE EVALUATION  PAIN: Pain Level at Rest: 0/10  Pain Level with Use: 10/10  Pain Location: peroneal tendons (lateral ankle), plantar foot at plantar fascia  Pain Quality: Sharp and Shooting  Pain Frequency: intermittent  Pain is Exacerbated By: walking, time on feet, activity in general  Pain is Relieved By: nothing  Pain Progression: Worsened  INTEGUMENTARY (edema, incisions):  mild swelling around lateral malleolus  POSTURE:   GAIT:   Weightbearing Status: WBAT  Assistive Device(s): None  Gait Deviations: Antalgic  Foot flat/less heel strike, decreased toe off  BALANCE/PROPRIOCEPTION:   WEIGHT BEARING ALIGNMENT:   NON-WEIGHTBEARING ALIGNMENT:    ROM:   (Degrees) Left AROM Left PROM  Right  AROM Right PROM   Ankle Dorsiflexion 6      Ankle Plantarflexion 46      Ankle Inversion 28 no pain      Ankle Eversion 20 no pain      Great Toe Flexion       Great Toe Extension       Pain:   End feel:     STRENGTH:   Pain: - none + mild ++ moderate +++ severe  Strength Scale: 0-5/5 Left Right   Ankle Dorsiflexion 4 5   Ankle Plantarflexion 4 5   Ankle Inversion 4, + (mild) 5   Ankle Eversion 5 5   Great Toe Flexion     Great Toe Extension     Anterior Tibialis     Posterior Tibialis     Peroneals     Extensor Digitorum     Gastroc/Soleus       FLEXIBILITY:   SPECIAL TESTS:   FUNCTIONAL TESTS:   PALPATION:  peroneal tendons, posterior tibialis tendon, Achilles slightly, plantar fascia  JOINT MOBILITY:  TCJ and STJ hypomobile all directions    Assessment & Plan   CLINICAL IMPRESSIONS  Medical Diagnosis: Plantar fasciitis of left foot; Peroneal tendinitis of left lower extremity    Treatment Diagnosis: Left ankle and foot pain, chronic   Impression/Assessment: Patient is a 43 year old female with left foot and ankle complaints.  The following significant findings have been identified: Pain, Decreased ROM/flexibility, Decreased joint mobility, and Decreased strength. These impairments interfere with their ability to perform self care tasks, work tasks, recreational activities, household chores, household mobility, and community mobility as compared to previous level of function.     Clinical Decision Making (Complexity):  Clinical Presentation: Stable/Uncomplicated  Clinical Presentation Rationale: based on medical and personal factors listed in PT evaluation  Clinical Decision Making (Complexity): Low complexity    PLAN OF CARE  Treatment Interventions:  Interventions: Manual Therapy, Neuromuscular Re-education, Therapeutic Activity, Therapeutic Exercise, Self-Care/Home Management    Long Term Goals     PT Goal 1  Goal Identifier: LTG 1  Goal Description: Patient will be able to stand for 1-2 hours at a time over an  8 hour work day with 2/10 ankle and foot pain at worst  Rationale: to maximize safety and independence within the community;to maximize safety and independence within the home  Target Date: 02/13/24      Frequency of Treatment: 1x/week  Duration of Treatment: 4 weeks then 2x/month for 2 months    Recommended Referrals to Other Professionals:  none  Education Assessment:   Learner/Method: No Barriers to Learning;Pictures/Video;Demonstration;Reading;Listening;Patient    Risks and benefits of evaluation/treatment have been explained.   Patient/Family/caregiver agrees with Plan of Care.     Evaluation Time:     PT Eval, Low Complexity Minutes (06781): 22       Signing Clinician: Michael Sparks, PT      [facility]                                                                                   OUTPATIENT PHYSICAL THERAPY      PLAN OF TREATMENT FOR OUTPATIENT REHABILITATION   Patient's Last Name, First Name, Tanya Callaway YOB: 1980   Provider's Name   [facility]   Medical Record No.  [id number]     Onset Date: 11/16/23 (chronic (date of order))  Start of Care Date: 11/21/23     Medical Diagnosis:  Plantar fasciitis of left foot; Peroneal tendinitis of left lower extremity      PT Treatment Diagnosis:  Left ankle and foot pain, chronic Plan of Treatment  Frequency/Duration: 1x/week/ 4 weeks then 2x/month for 2 months    Certification date from 11/21/23 to 02/13/24         See note for plan of treatment details and functional goals     Michael Sparks, PT                         I CERTIFY THE NEED FOR THESE SERVICES FURNISHED UNDER        THIS PLAN OF TREATMENT AND WHILE UNDER MY CARE     (Physician attestation of this document indicates review and certification of the therapy plan).              Referring Provider:  Des Mckeon    Initial Assessment  See Epic Evaluation- Start of Care Date: 11/21/23

## 2023-12-05 ENCOUNTER — ANCILLARY PROCEDURE (OUTPATIENT)
Dept: ULTRASOUND IMAGING | Facility: CLINIC | Age: 43
End: 2023-12-05
Attending: SURGERY
Payer: COMMERCIAL

## 2023-12-05 ENCOUNTER — ANCILLARY PROCEDURE (OUTPATIENT)
Dept: MAMMOGRAPHY | Facility: CLINIC | Age: 43
End: 2023-12-05
Attending: SURGERY
Payer: COMMERCIAL

## 2023-12-05 ENCOUNTER — OFFICE VISIT (OUTPATIENT)
Dept: SURGERY | Facility: CLINIC | Age: 43
End: 2023-12-05
Payer: COMMERCIAL

## 2023-12-05 DIAGNOSIS — N64.4 MASTODYNIA OF LEFT BREAST: ICD-10-CM

## 2023-12-05 DIAGNOSIS — N64.4 MASTODYNIA OF LEFT BREAST: Primary | ICD-10-CM

## 2023-12-05 PROCEDURE — G0279 TOMOSYNTHESIS, MAMMO: HCPCS | Performed by: RADIOLOGY

## 2023-12-05 PROCEDURE — 99212 OFFICE O/P EST SF 10 MIN: CPT | Performed by: SURGERY

## 2023-12-05 PROCEDURE — 76882 US LMTD JT/FCL EVL NVASC XTR: CPT | Mod: LT | Performed by: RADIOLOGY

## 2023-12-05 PROCEDURE — 77065 DX MAMMO INCL CAD UNI: CPT | Mod: LT | Performed by: RADIOLOGY

## 2023-12-05 NOTE — LETTER
12/5/2023         RE: Tanya Denny  [address]  [place] [address]        Dear Colleague,    Thank you for referring your patient, Tanya Denny, to the [facility]. Please see a copy of my visit note below.    FOLLOW-UP  Dec 5, 2023    Tanya Denny is a 43 year old female who returns for follow-up for left axillary pain.    HPI:    Since her last visit, she has undergone repeat breast imaging, specifically a left axillary US and left mammogram.  The Voltaren gel has not helped.    There were no vitals taken for this visit.   Physical exam deferred.    INVESTIGATIONS:    Diagnostic mammogram and left axillary US (12/5/2023) showed:  FINDINGS:  BREAST DENSITY: Scattered fibroglandular densities.  Diagnostic mammogram demonstrates that the area of focal concern represents a large region of accessory fibroglandular tissue in the left axilla. Normal lymph nodes.  Targeted ultrasound by technologist and radiologist. In the area of clinical concern spanning the upper-outer quadrant of the left breast into the left axilla there is a broad area of accessory breast tissue that extends into the high axilla.  IMPRESSION: BI-RADS CATEGORY: 2 - Benign.    ASSESSMENT:    Tanya Denny is a 43 year old female with left axillary pain.    I personally reviewed the imaging with our radiologist and with Tanya Denny. There is broad but wispy area of accessory breast tissue throughout the axilla, without a distinct identifiable discrete mass.  I reviewed with Tanya Lorddicaitlyn Denny that it is difficult to determine what to resect and what is causing the pain given these broad/diffuse findings of what is otherwise normal-appearing breast tissue. There is a high chance that her pain would not resolve with surgery for a region that is not well-defined. There is also a high likelihood of paresthesias with axillary surgery.  I therefore have not  recommended resection at this time.      All of the above were discussed and all questions were answered.    Total time spent with the patient was 15 minutes, of which 75% was counseling.     PLAN:  Defer ongoing care to PCP    Anais Resendez MD MS [facility] FACS  Associate Professor of Surgery  Division of Surgical Oncology  [facility]     18 minutes spent on the date of the encounter doing chart review, review of test result(s), interpretation of test(s), patient visit, documentation, and discussion with other physician(s).      Again, thank you for allowing me to participate in the care of your patient.        Sincerely,        Anais Resendez MD

## 2023-12-05 NOTE — NURSING NOTE
Tanya Denny's goals for this visit include:   Chief Complaint   Patient presents with    RECHECK     Review breast imaging       She requests these members of her care team be copied on today's visit information: no    PCP: Mele Floyd    Referring Provider:  No referring provider defined for this encounter.    There were no vitals taken for this visit.    Do you need any medication refills at today's visit? No    Doris Negrete LPN

## 2023-12-05 NOTE — PROGRESS NOTES
FOLLOW-UP  Dec 5, 2023    Tanya Denny is a 43 year old female who returns for follow-up for left axillary pain.    HPI:    Since her last visit, she has undergone repeat breast imaging, specifically a left axillary US and left mammogram.  The Voltaren gel has not helped.    There were no vitals taken for this visit.   Physical exam deferred.    INVESTIGATIONS:    Diagnostic mammogram and left axillary US (12/5/2023) showed:  FINDINGS:  BREAST DENSITY: Scattered fibroglandular densities.  Diagnostic mammogram demonstrates that the area of focal concern represents a large region of accessory fibroglandular tissue in the left axilla. Normal lymph nodes.  Targeted ultrasound by technologist and radiologist. In the area of clinical concern spanning the upper-outer quadrant of the left breast into the left axilla there is a broad area of accessory breast tissue that extends into the high axilla.  IMPRESSION: BI-RADS CATEGORY: 2 - Benign.    ASSESSMENT:    Tanya Denny is a 43 year old female with left axillary pain.    I personally reviewed the imaging with our radiologist and with Tanya Denny. There is broad but wispy area of accessory breast tissue throughout the axilla, without a distinct identifiable discrete mass.  I reviewed with Tanya Denny that it is difficult to determine what to resect and what is causing the pain given these broad/diffuse findings of what is otherwise normal-appearing breast tissue. There is a high chance that her pain would not resolve with surgery for a region that is not well-defined. There is also a high likelihood of paresthesias with axillary surgery.  I therefore have not recommended resection at this time.      All of the above were discussed and all questions were answered.    Total time spent with the patient was 15 minutes, of which 75% was counseling.     PLAN:  Defer ongoing care to PCP    Anais Resendez MD MS [facility] FACS  Associate  Professor of Surgery  Division of Surgical Oncology  [facility]     18 minutes spent on the date of the encounter doing chart review, review of test result(s), interpretation of test(s), patient visit, documentation, and discussion with other physician(s).

## 2023-12-06 ENCOUNTER — THERAPY VISIT (OUTPATIENT)
Dept: PHYSICAL THERAPY | Facility: CLINIC | Age: 43
End: 2023-12-06
Payer: COMMERCIAL

## 2023-12-06 DIAGNOSIS — M25.572 PAIN IN JOINT INVOLVING ANKLE AND FOOT, LEFT: Primary | ICD-10-CM

## 2023-12-06 PROCEDURE — 97110 THERAPEUTIC EXERCISES: CPT | Mod: GP | Performed by: PHYSICAL THERAPY ASSISTANT

## 2023-12-06 PROCEDURE — 97033 APP MDLTY 1+IONTPHRSIS EA 15: CPT | Mod: GP | Performed by: PHYSICAL THERAPY ASSISTANT

## 2023-12-06 PROCEDURE — 97140 MANUAL THERAPY 1/> REGIONS: CPT | Mod: GP | Performed by: PHYSICAL THERAPY ASSISTANT

## 2023-12-12 ENCOUNTER — OFFICE VISIT (OUTPATIENT)
Dept: FAMILY MEDICINE | Facility: CLINIC | Age: 43
End: 2023-12-12
Payer: COMMERCIAL

## 2023-12-12 VITALS
RESPIRATION RATE: 16 BRPM | HEIGHT: 64 IN | WEIGHT: 212 LBS | OXYGEN SATURATION: 100 % | TEMPERATURE: 98.2 F | HEART RATE: 76 BPM | DIASTOLIC BLOOD PRESSURE: 86 MMHG | SYSTOLIC BLOOD PRESSURE: 122 MMHG | BODY MASS INDEX: 36.19 KG/M2

## 2023-12-12 DIAGNOSIS — N89.8 VAGINAL DISCHARGE: ICD-10-CM

## 2023-12-12 DIAGNOSIS — B37.31 YEAST VAGINITIS: Primary | ICD-10-CM

## 2023-12-12 LAB
ALBUMIN UR-MCNC: NEGATIVE MG/DL
APPEARANCE UR: CLEAR
BACTERIA #/AREA URNS HPF: ABNORMAL /HPF
BILIRUB UR QL STRIP: NEGATIVE
CLUE CELLS: ABNORMAL
COLOR UR AUTO: YELLOW
GLUCOSE UR STRIP-MCNC: NEGATIVE MG/DL
HGB UR QL STRIP: ABNORMAL
KETONES UR STRIP-MCNC: NEGATIVE MG/DL
LEUKOCYTE ESTERASE UR QL STRIP: NEGATIVE
NITRATE UR QL: NEGATIVE
PH UR STRIP: 7 [PH] (ref 5–7)
RBC #/AREA URNS AUTO: ABNORMAL /HPF
SP GR UR STRIP: 1.02 (ref 1–1.03)
TRICHOMONAS, WET PREP: ABNORMAL
UROBILINOGEN UR STRIP-ACNC: 0.2 E.U./DL
WBC #/AREA URNS AUTO: ABNORMAL /HPF
WBC'S/HIGH POWER FIELD, WET PREP: ABNORMAL
YEAST, WET PREP: PRESENT

## 2023-12-12 PROCEDURE — 87210 SMEAR WET MOUNT SALINE/INK: CPT | Performed by: PHYSICIAN ASSISTANT

## 2023-12-12 PROCEDURE — 81001 URINALYSIS AUTO W/SCOPE: CPT | Performed by: PHYSICIAN ASSISTANT

## 2023-12-12 PROCEDURE — 99213 OFFICE O/P EST LOW 20 MIN: CPT | Performed by: PHYSICIAN ASSISTANT

## 2023-12-12 RX ORDER — FLUCONAZOLE 150 MG/1
150 TABLET ORAL
Qty: 3 TABLET | Refills: 0 | Status: SHIPPED | OUTPATIENT
Start: 2023-12-12 | End: 2023-12-19

## 2023-12-12 RX ORDER — MULTIPLE VITAMINS W/ MINERALS TAB 9MG-400MCG
1 TAB ORAL DAILY
COMMUNITY
End: 2024-01-09

## 2023-12-12 ASSESSMENT — PAIN SCALES - GENERAL: PAINLEVEL: WORST PAIN (10)

## 2023-12-12 NOTE — PROGRESS NOTES
"  Assessment & Plan     Yeast vaginitis  Reviewed results with patient, will treat with previous  effective course. Some blood seen in urine but currently menstruating, otherwise unremarkable.  - fluconazole (DIFLUCAN) 150 MG tablet; Take 1 tablet (150 mg) by mouth every 72 hours for 3 doses    Vaginal discharge  - Wet prep - lab collect; Future  - UA Macroscopic with reflex to Microscopic and Culture - Lab Collect; Future  - Wet prep - lab collect  - UA Macroscopic with reflex to Microscopic and Culture - Lab Collect  - UA Microscopic with Reflex to Culture             BMI:   Estimated body mass index is 36.39 kg/m  as calculated from the following:    Height as of this encounter: 1.626 m (5' 4\").    Weight as of this encounter: 96.2 kg (212 lb).           Etta Vila PA-C  [facility]    Ange Martínez is a 43 year old, presenting for the following health issues:  UTI (Burning and going more often )        12/12/2023     3:44 PM   Additional Questions   Roomed by kory   Accompanied by self       History of Present Illness       Reason for visit:  Pat  Symptom onset:  1-3 days ago  Symptom intensity:  Moderate  Symptom progression:  Worsening  Had these symptoms before:  No    She eats 2-3 servings of fruits and vegetables daily.She consumes 0 sweetened beverage(s) daily.She exercises with enough effort to increase her heart rate 30 to 60 minutes per day.  She exercises with enough effort to increase her heart rate 5 days per week.   She is taking medications regularly.       Four days ago started having burning to vaginal region, increased with urination. Worsened to itching. Felt like past yeast infections. Increased fluids and using OTC cream without improvement. In the past has needed diflucan x 3. Currently finishing menstrual cycle. In the past has had know trigger like using pads, however this started prior.      Review of Systems         Objective    /86 (BP " "Location: Left arm, Patient Position: Chair, Cuff Size: Adult Regular)   Pulse 76   Temp 98.2  F (36.8  C) (Tympanic)   Resp 16   Ht 1.626 m (5' 4\")   Wt 96.2 kg (212 lb)   SpO2 100%   BMI 36.39 kg/m    Body mass index is 36.39 kg/m .  Physical Exam   GENERAL: healthy, alert and no distress                      "

## 2023-12-12 NOTE — PATIENT INSTRUCTIONS
At [facility], we strive to deliver an exceptional experience to you, every time we see you. If you receive a survey, please complete it as we do value your feedback.  If you have MyChart, you can expect to receive results automatically within 24 hours of their completion.  Your provider will send a note interpreting your results as well.   If you do not have MyChart, you should receive your results in about a week by mail.    Your care team:                            Family Medicine Internal Medicine   MD Gregoiro Madrigal MD Shantel Branch-Fleming, MD Srinivasa Vaka, MD Katya Belousova, PAEDMOND Whittaker, MD Pediatrics   Brown Retana, PAMD Clara Fulton MD Amelia Massimini APRN CNP   TAMRA Jacob CNP, MD Charanya Pasupathi, MD Kathleen Widmer, NP coming October 2023 Same-Day (No follow up visit)    SANDRA Harvey PA coming Oct 2023     Clinic hours: Monday - Thursday 7 am-6 pm; Fridays 7 am-5 pm.   Urgent care: Monday - Friday 10 am- 8 pm; Saturday and Sunday 9 am-5 pm.    Clinic: [phone]       [facility]: Monday - Thursday 8 am - 7 pm; Friday 8 am - 6 pm  [facility]: [phone]

## 2023-12-12 NOTE — COMMUNITY RESOURCES LIST (ENGLISH)
12/12/2023   [facility]  N/A  For questions about this resource list or additional care needs, please contact your primary care clinic or care manager.  Phone: [phone]   Email: N/A   Address: [facility] [place] [address]   Hours: N/A        Hotlines and Helplines       Hotline - Housing crisis  1  [facility] Distance: 10.65 miles      Phone/Virtual   [address] [place] [address]  Language: English  Hours: Mon - Sun Open 24 Hours  Fees: Free   Phone: (196) 575-5930 Email: info@[employer].org Website: http://www.saintolaf.org/     2  [facility] Distance: 11.25 miles      Phone/Virtual   [address] [place] [address]  Language: English  Hours: Mon - Sun Open 24 Hours   Phone: (854) 937-5944 Email: info@[employer] Website: http://www.[employer].org          Housing       Coordinated Entry access point  3  [facility]  [facility] Distance: 6.18 miles      Phone/Virtual   [address] [place] [address]  Language: English  Hours: Mon - Fri 8:30 AM - 12:00 PM , Mon - Fri 1:00 PM - 4:00 PM  Fees: Free   Phone: (347) 192-7081 Ext.2 Email: mani@[facility].[employer].org Website: https://www.[employer]a.org/usn/     4  [facility] Distance: 10.03 miles      In-Person, Phone/Virtual   [address] [place] [address]  Language: English, Rwandan  Hours: Mon - Fri 10:00 AM - 5:30 PM , Mon - Sun 7:30 PM - 10:20 PM , Sat - Sun 1:00 PM - 5:30 PM  Fees: Free   Phone: (680) 968-6216 Email: info@[employer] Website: https://www.[employer].org/our-programs/adult-shelter-connect-dunn-shelter/     Drop-in center or day shelter  5  [facility] Distance: 9.84 miles      In-Person   [address] [place] [address]  Language: English, Hmong, Maldivian, Rwandan  Hours: Mon - Thu 8:30 AM - 4:30 PM , Sat - Sun 9:00 AM - 12:00 PM   Fees: Free   Phone: (261) 176-1609 Email: info@[employer] Website: https://[employer].org/     6  [facility] Distance: 11.3 miles      In-Person   [address] [place] [address]  Language: English, Guinean, Albanian  Hours: Mon - Sat 7:00 AM - 3:00 PM  Fees: Free, Self Pay   Phone: (200) 512-5744 Email: info@[employer] Website: https://www.[employer]/locations/opportunity-center/     Housing search assistance  7  [facility] Distance: 2.47 miles      In-Person   [address] [place] [address]  Language: English  Hours: Mon - Fri 8:00 AM - 4:00 PM  Fees: Free   Phone: (716) 967-2836 Email: info@[employer] Website: https://[employer]/     8  [facility] "[employer] [facility] [place] [employer] Distance: 4.22 miles      Phone/Virtual   [address] [place] [address]  Language: English, Albanian  Hours: Mon - Fri 9:00 AM - 5:00 PM  Fees: Free   Phone: (436) 427-8395 Email: services@[employer] Website: https://www.[employer]     Shelter for families  9  [facility] Distance: 18.31 miles      In-Person   [address] [place] [address]  Language: English  Hours: Mon - Fri 3:00 PM - 9:00 AM , Sat - Sun Open 24 Hours  Fees: Free   Phone: (484) 474-8515 Ext.1 Website: https://www.saintandrews.org/2020/07/03/emergency-family-shelter/     Shelter for individuals  10  [facility] Distance: 10.22 miles      In-Person   [address] [place] [address]  Language: English  Hours: Mon - Fri 4:00 PM - 9:00 AM  Fees: Free   Phone: (109) 587-8083 Email: zachery@[facility].org Website: https://[facility].org/[facility]/     11  [facility] Distance: 11.7 miles      In-Person   [address] [place] [address]  Language:  English  Hours: Mon - Sun Open 24 Hours  Fees: Free   Phone: (761) 981-1004 Email: communications@[facility].org Website: https://[facility].org/oursaviourshousing/          Important Numbers & Websites       Emergency Services   911  [facility]   311  Poison Control   (435) 198-4485  Suicide Prevention Lifeline   (237) 749-4215 (TALK)  Child Abuse Hotline   (899) 654-2190 (4-A-Child)  Sexual Assault Hotline   (795) 943-7424 (HOPE)  National Runaway Safeline   (899) 808-3836 (RUNAWAY)  All-Options Talkline   (694) 294-1762  Substance Abuse Referral   (819) 302-1630 (HELP)

## 2023-12-13 ENCOUNTER — THERAPY VISIT (OUTPATIENT)
Dept: PHYSICAL THERAPY | Facility: CLINIC | Age: 43
End: 2023-12-13
Payer: COMMERCIAL

## 2023-12-13 DIAGNOSIS — M25.572 PAIN IN JOINT INVOLVING ANKLE AND FOOT, LEFT: Primary | ICD-10-CM

## 2023-12-13 PROCEDURE — 97033 APP MDLTY 1+IONTPHRSIS EA 15: CPT | Mod: GP | Performed by: PHYSICAL THERAPY ASSISTANT

## 2023-12-13 PROCEDURE — 97140 MANUAL THERAPY 1/> REGIONS: CPT | Mod: GP | Performed by: PHYSICAL THERAPY ASSISTANT

## 2023-12-13 PROCEDURE — 97110 THERAPEUTIC EXERCISES: CPT | Mod: GP | Performed by: PHYSICAL THERAPY ASSISTANT

## 2023-12-15 ENCOUNTER — OFFICE VISIT (OUTPATIENT)
Dept: PODIATRY | Facility: CLINIC | Age: 43
End: 2023-12-15
Payer: COMMERCIAL

## 2023-12-15 DIAGNOSIS — M72.2 PLANTAR FASCIITIS OF LEFT FOOT: Primary | ICD-10-CM

## 2023-12-15 DIAGNOSIS — M76.72 PERONEAL TENDINITIS OF LEFT LOWER EXTREMITY: ICD-10-CM

## 2023-12-15 PROCEDURE — 99213 OFFICE O/P EST LOW 20 MIN: CPT | Performed by: PODIATRIST

## 2023-12-15 NOTE — LETTER
12/15/2023         RE: Tanya Denny  [address]  [place] [address]        Dear Colleague,    Thank you for referring your patient, Tanya Denny, to the [facility]. Please see a copy of my visit note below.    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     Somatic dysfunction of lumbar region     Endometritis     GERD (gastroesophageal reflux disease)     Multigravida of advanced maternal age in third trimester     Morbid obesity (H)     Pain in joint involving ankle and foot, left     Past Surgical History:   Procedure Laterality Date     D & C       Social History     Socioeconomic History     Marital status:      Spouse name: Not on file     Number of children: Not on file     Years of education: Not on file     Highest education level: Not on file   Occupational History     Not on file   Tobacco Use     Smoking status: Never     Smokeless tobacco: Never   Vaping Use     Vaping Use: Never used   Substance and Sexual Activity     Alcohol use: No     Drug use: No     Sexual activity: Yes     Partners: Male     Birth control/protection: None   Other Topics Concern     Parent/sibling w/ CABG, MI or angioplasty before 65F 55M? No      Service No     Blood Transfusions No     Caffeine Concern No     Occupational Exposure No     Hobby Hazards No     Sleep Concern No     Stress Concern No     Weight Concern No     Special Diet Yes     Back Care No     Exercise Yes     Bike Helmet No     Seat Belt Yes     Self-Exams Yes   Social History Narrative    Mother of 4, all starting school this yearWorks in  can bring her kids there too.  is a teacher , out  of work now.         How much exercise per week? 3 days     How much calcium per day? 1 serving      How much caffeine per day? 3 cups    How much vitamin D per day?  prenatals    Do you/your family wear seatbelts?  Yes    Do you/your family use safety helmets? na    Do you/your family use sunscreen?No    Do you/your family keep firearms in the home? No    Do you/your family have a smoke detector(s)? Yes        Do you feel safe in your home? Yes    Has anyone ever touched you in an unwanted manner?      Explain         June 11, 2014 Shaneka Abbasi LPN        [facility]         Social Determinants of Health     Financial Resource Strain: Low Risk  (12/12/2023)    Financial Resource Strain      Within the past 12 months, have you or your family members you live with been unable to get utilities (heat, electricity) when it was really needed?: No   Food Insecurity: Low Risk  (12/12/2023)    Food Insecurity      Within the past 12 months, did you worry that your food would run out before you got money to buy more?: No      Within the past 12 months, did the food you bought just not last and you didn t have money to get more?: No   Transportation Needs: Low Risk  (12/12/2023)    Transportation Needs      Within the past 12 months, has lack of transportation kept you from medical appointments, getting your medicines, non-medical meetings or appointments, work, or from getting things that you need?: No   Physical Activity: Not on file   Stress: Not on file   Social Connections: Not on file   Interpersonal Safety: Low Risk  (12/12/2023)    Interpersonal Safety      Do you feel physically and emotionally safe where you currently live?: Yes      Within the past 12 months, have you been hit, slapped, kicked or otherwise physically hurt by someone?: No      Within the past 12 months, have you been humiliated or emotionally abused in other ways by your partner or ex-partner?: No   Housing Stability: High Risk  (12/12/2023)    Housing Stability      Do you have housing? : No      Are you worried about losing your housing?: No     Family History   Problem Relation Age of Onset     Allergies Mother         angioedema in family members unspecified     Diabetes Mother      Hypertension Mother      Diabetes Father      Hypertension Father      Diabetes Maternal Grandmother      Hypertension Maternal Grandmother      Asthma Maternal Grandmother      Diabetes Maternal Grandfather      Hypertension Maternal Grandfather      Asthma Maternal Grandfather      Cancer No family hx of      Cerebrovascular Disease No family hx of      Thyroid Disease No family hx of      Glaucoma No family hx of      Macular Degeneration No family hx of      Breast Cancer No family hx of      Colon Cancer No family hx of      Depression No family hx of      Anxiety Disorder No family hx of      Genetic Disorder No family hx of            Subjective findings- 43-year-old returns clinic for plantar fasciitis and peroneal tendinopathy left.  She relates to doing physical therapy and that is helping.    Objective findings- vascular status intact left.  Has mild pain on palpation of plantar left heel.  There is no erythema, drainage, no odor, no calor.    Assessment and plan- Plantar fasciitis left, Peroneal tendinopathy left.  Diagnosis and treatment options discussed with the patient.  Continue physical therapy.  Continue Lodine as needed.  Return to clinic and see me as needed.  Previous notes reviewed.              Low level of medical decision making.      Again, thank you for allowing me to participate in the care of your patient.        Sincerely,        Des Mckeon DPM

## 2023-12-15 NOTE — PROGRESS NOTES
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    Somatic dysfunction of lumbar region    Endometritis    GERD (gastroesophageal reflux disease)    Multigravida of advanced maternal age in third trimester    Morbid obesity (H)    Pain in joint involving ankle and foot, left     Past Surgical History:   Procedure Laterality Date    D & C       Social History     Socioeconomic History    Marital status:      Spouse name: Not on file    Number of children: Not on file    Years of education: Not on file    Highest education level: Not on file   Occupational History    Not on file   Tobacco Use    Smoking status: Never    Smokeless tobacco: Never   Vaping Use    Vaping Use: Never used   Substance and Sexual Activity    Alcohol use: No    Drug use: No    Sexual activity: Yes     Partners: Male     Birth control/protection: None   Other Topics Concern    Parent/sibling w/ CABG, MI or angioplasty before 65F 55M? No     Service No    Blood Transfusions No    Caffeine Concern No    Occupational Exposure No    Hobby Hazards No    Sleep Concern No    Stress Concern No    Weight Concern No    Special Diet Yes    Back Care No    Exercise Yes    Bike Helmet No    Seat Belt Yes    Self-Exams Yes   Social History Narrative    Mother of 4, all starting school this yearWorks in  can bring her kids there too.  is a teacher , out of work now.         How much exercise per week? 3 days     How much calcium per day? 1 serving      How much caffeine per day? 3 cups    How much vitamin D per day?  prenatals    Do you/your family wear seatbelts?  Yes    Do you/your family use safety helmets? na    Do you/your family use sunscreen?No    Do  you/your family keep firearms in the home? No    Do you/your family have a smoke detector(s)? Yes        Do you feel safe in your home? Yes    Has anyone ever touched you in an unwanted manner?      Explain         June 11, 2014 Shaneka Abbasi LPN        Reviewed mikal espinoza 12-         Social Determinants of Health     Financial Resource Strain: Low Risk  (12/12/2023)    Financial Resource Strain     Within the past 12 months, have you or your family members you live with been unable to get utilities (heat, electricity) when it was really needed?: No   Food Insecurity: Low Risk  (12/12/2023)    Food Insecurity     Within the past 12 months, did you worry that your food would run out before you got money to buy more?: No     Within the past 12 months, did the food you bought just not last and you didn t have money to get more?: No   Transportation Needs: Low Risk  (12/12/2023)    Transportation Needs     Within the past 12 months, has lack of transportation kept you from medical appointments, getting your medicines, non-medical meetings or appointments, work, or from getting things that you need?: No   Physical Activity: Not on file   Stress: Not on file   Social Connections: Not on file   Interpersonal Safety: Low Risk  (12/12/2023)    Interpersonal Safety     Do you feel physically and emotionally safe where you currently live?: Yes     Within the past 12 months, have you been hit, slapped, kicked or otherwise physically hurt by someone?: No     Within the past 12 months, have you been humiliated or emotionally abused in other ways by your partner or ex-partner?: No   Housing Stability: High Risk (12/12/2023)    Housing Stability     Do you have housing? : No     Are you worried about losing your housing?: No     Family History   Problem Relation Age of Onset    Allergies Mother         angioedema in family members unspecified    Diabetes Mother     Hypertension Mother     Diabetes Father     Hypertension  Father     Diabetes Maternal Grandmother     Hypertension Maternal Grandmother     Asthma Maternal Grandmother     Diabetes Maternal Grandfather     Hypertension Maternal Grandfather     Asthma Maternal Grandfather     Cancer No family hx of     Cerebrovascular Disease No family hx of     Thyroid Disease No family hx of     Glaucoma No family hx of     Macular Degeneration No family hx of     Breast Cancer No family hx of     Colon Cancer No family hx of     Depression No family hx of     Anxiety Disorder No family hx of     Genetic Disorder No family hx of            Subjective findings- 43-year-old returns clinic for plantar fasciitis and peroneal tendinopathy left.  She relates to doing physical therapy and that is helping.    Objective findings- vascular status intact left.  Has mild pain on palpation of plantar left heel.  There is no erythema, drainage, no odor, no calor.    Assessment and plan- Plantar fasciitis left, Peroneal tendinopathy left.  Diagnosis and treatment options discussed with the patient.  Continue physical therapy.  Continue Lodine as needed.  Return to clinic and see me as needed.  Previous notes reviewed.              Low level of medical decision making.

## 2023-12-20 ENCOUNTER — THERAPY VISIT (OUTPATIENT)
Dept: PHYSICAL THERAPY | Facility: CLINIC | Age: 43
End: 2023-12-20
Payer: COMMERCIAL

## 2023-12-20 DIAGNOSIS — M25.572 PAIN IN JOINT INVOLVING ANKLE AND FOOT, LEFT: Primary | ICD-10-CM

## 2023-12-20 PROCEDURE — 97140 MANUAL THERAPY 1/> REGIONS: CPT | Mod: GP | Performed by: PHYSICAL THERAPY ASSISTANT

## 2023-12-20 PROCEDURE — 97033 APP MDLTY 1+IONTPHRSIS EA 15: CPT | Mod: GP | Performed by: PHYSICAL THERAPY ASSISTANT

## 2023-12-26 ENCOUNTER — THERAPY VISIT (OUTPATIENT)
Dept: PHYSICAL THERAPY | Facility: CLINIC | Age: 43
End: 2023-12-26
Payer: COMMERCIAL

## 2023-12-26 DIAGNOSIS — M25.572 PAIN IN JOINT INVOLVING ANKLE AND FOOT, LEFT: Primary | ICD-10-CM

## 2023-12-26 PROCEDURE — 97033 APP MDLTY 1+IONTPHRSIS EA 15: CPT | Mod: GP | Performed by: PHYSICAL THERAPIST

## 2024-01-04 ENCOUNTER — TELEPHONE (OUTPATIENT)
Dept: FAMILY MEDICINE | Facility: CLINIC | Age: 44
End: 2024-01-04
Payer: COMMERCIAL

## 2024-01-04 NOTE — TELEPHONE ENCOUNTER
Patient trying to schedule physical and concerns for low back pain. Please schedule on any same day or approval slot for early next week. Thank you.  Mele Floyd MD on 1/4/2024 at 8:35 AM

## 2024-01-09 ENCOUNTER — OFFICE VISIT (OUTPATIENT)
Dept: FAMILY MEDICINE | Facility: CLINIC | Age: 44
End: 2024-01-09
Payer: COMMERCIAL

## 2024-01-09 VITALS
HEIGHT: 64 IN | TEMPERATURE: 97.2 F | HEART RATE: 78 BPM | SYSTOLIC BLOOD PRESSURE: 110 MMHG | OXYGEN SATURATION: 100 % | BODY MASS INDEX: 36.19 KG/M2 | WEIGHT: 212 LBS | DIASTOLIC BLOOD PRESSURE: 74 MMHG

## 2024-01-09 DIAGNOSIS — Z00.00 ROUTINE GENERAL MEDICAL EXAMINATION AT A HEALTH CARE FACILITY: Primary | ICD-10-CM

## 2024-01-09 DIAGNOSIS — M75.82 ROTATOR CUFF TENDONITIS, LEFT: ICD-10-CM

## 2024-01-09 DIAGNOSIS — Z13.29 SCREENING FOR ENDOCRINE DISORDER: ICD-10-CM

## 2024-01-09 DIAGNOSIS — Z13.220 LIPID SCREENING: ICD-10-CM

## 2024-01-09 DIAGNOSIS — M77.02 MEDIAL EPICONDYLITIS OF ELBOW, LEFT: ICD-10-CM

## 2024-01-09 DIAGNOSIS — Z01.00 VISIT FOR EYE AND VISION EXAM: ICD-10-CM

## 2024-01-09 DIAGNOSIS — E61.1 IRON DEFICIENCY: ICD-10-CM

## 2024-01-09 DIAGNOSIS — R70.0 ELEVATED ERYTHROCYTE SEDIMENTATION RATE: ICD-10-CM

## 2024-01-09 DIAGNOSIS — M75.22 BICEPS TENDONITIS, LEFT: ICD-10-CM

## 2024-01-09 DIAGNOSIS — M25.50 MULTIPLE JOINT PAIN: ICD-10-CM

## 2024-01-09 PROBLEM — O09.523 MULTIGRAVIDA OF ADVANCED MATERNAL AGE IN THIRD TRIMESTER: Status: RESOLVED | Noted: 2019-02-01 | Resolved: 2024-01-09

## 2024-01-09 LAB
BASOPHILS # BLD AUTO: 0 10E3/UL (ref 0–0.2)
BASOPHILS NFR BLD AUTO: 0 %
EOSINOPHIL # BLD AUTO: 0.1 10E3/UL (ref 0–0.7)
EOSINOPHIL NFR BLD AUTO: 1 %
ERYTHROCYTE [DISTWIDTH] IN BLOOD BY AUTOMATED COUNT: 12.2 % (ref 10–15)
ERYTHROCYTE [SEDIMENTATION RATE] IN BLOOD BY WESTERGREN METHOD: 64 MM/HR (ref 0–20)
HBA1C MFR BLD: 5.7 % (ref 0–5.6)
HCT VFR BLD AUTO: 33.9 % (ref 35–47)
HGB BLD-MCNC: 11.4 G/DL (ref 11.7–15.7)
IMM GRANULOCYTES # BLD: 0 10E3/UL
IMM GRANULOCYTES NFR BLD: 0 %
LYMPHOCYTES # BLD AUTO: 2.4 10E3/UL (ref 0.8–5.3)
LYMPHOCYTES NFR BLD AUTO: 30 %
MCH RBC QN AUTO: 28 PG (ref 26.5–33)
MCHC RBC AUTO-ENTMCNC: 33.6 G/DL (ref 31.5–36.5)
MCV RBC AUTO: 83 FL (ref 78–100)
MONOCYTES # BLD AUTO: 0.5 10E3/UL (ref 0–1.3)
MONOCYTES NFR BLD AUTO: 6 %
NEUTROPHILS # BLD AUTO: 5 10E3/UL (ref 1.6–8.3)
NEUTROPHILS NFR BLD AUTO: 63 %
PLATELET # BLD AUTO: 235 10E3/UL (ref 150–450)
RBC # BLD AUTO: 4.07 10E6/UL (ref 3.8–5.2)
WBC # BLD AUTO: 8 10E3/UL (ref 4–11)

## 2024-01-09 PROCEDURE — 99214 OFFICE O/P EST MOD 30 MIN: CPT | Mod: 25 | Performed by: FAMILY MEDICINE

## 2024-01-09 PROCEDURE — 85652 RBC SED RATE AUTOMATED: CPT | Performed by: FAMILY MEDICINE

## 2024-01-09 PROCEDURE — 84443 ASSAY THYROID STIM HORMONE: CPT | Performed by: FAMILY MEDICINE

## 2024-01-09 PROCEDURE — 99396 PREV VISIT EST AGE 40-64: CPT | Mod: 25 | Performed by: FAMILY MEDICINE

## 2024-01-09 PROCEDURE — 83036 HEMOGLOBIN GLYCOSYLATED A1C: CPT | Performed by: FAMILY MEDICINE

## 2024-01-09 PROCEDURE — 85025 COMPLETE CBC W/AUTO DIFF WBC: CPT | Performed by: FAMILY MEDICINE

## 2024-01-09 PROCEDURE — 90715 TDAP VACCINE 7 YRS/> IM: CPT | Performed by: FAMILY MEDICINE

## 2024-01-09 PROCEDURE — 90471 IMMUNIZATION ADMIN: CPT | Performed by: FAMILY MEDICINE

## 2024-01-09 PROCEDURE — 36415 COLL VENOUS BLD VENIPUNCTURE: CPT | Performed by: FAMILY MEDICINE

## 2024-01-09 PROCEDURE — 82728 ASSAY OF FERRITIN: CPT | Performed by: FAMILY MEDICINE

## 2024-01-09 PROCEDURE — 80061 LIPID PANEL: CPT | Performed by: FAMILY MEDICINE

## 2024-01-09 PROCEDURE — 80048 BASIC METABOLIC PNL TOTAL CA: CPT | Performed by: FAMILY MEDICINE

## 2024-01-09 RX ORDER — MELOXICAM 15 MG/1
15 TABLET ORAL DAILY
Qty: 90 TABLET | Refills: 0 | Status: SHIPPED | OUTPATIENT
Start: 2024-01-09 | End: 2024-10-04

## 2024-01-09 ASSESSMENT — ENCOUNTER SYMPTOMS
MYALGIAS: 0
PALPITATIONS: 0
HEADACHES: 0
BACK PAIN: 1
COUGH: 0
WEAKNESS: 0
CHILLS: 0
FREQUENCY: 0
BREAST MASS: 0
HEMATOCHEZIA: 0
DIARRHEA: 0
NERVOUS/ANXIOUS: 0
NAUSEA: 0
SHORTNESS OF BREATH: 0
SORE THROAT: 0
FEVER: 0
DIZZINESS: 0
CONSTIPATION: 0
EYE PAIN: 0
HEARTBURN: 0
JOINT SWELLING: 0
DYSURIA: 0
ARTHRALGIAS: 0
PARESTHESIAS: 0
ABDOMINAL PAIN: 0
HEMATURIA: 0

## 2024-01-09 ASSESSMENT — PAIN SCALES - GENERAL: PAINLEVEL: WORST PAIN (10)

## 2024-01-09 NOTE — PROGRESS NOTES
SUBJECTIVE:   Tanya is a 44 year old, presenting for the following:  Physical and Back Pain        1/9/2024     3:40 PM   Additional Questions   Roomed by Barbara OBRIEN   Accompanied by Self         1/9/2024     3:40 PM   Patient Reported Additional Medications   Patient reports taking the following new medications n/a       Healthy Habits:     Getting at least 3 servings of Calcium per day:  Yes    Bi-annual eye exam:  Yes    Dental care twice a year:  Yes    Sleep apnea or symptoms of sleep apnea:  None    Diet:  Regular (no restrictions)    Frequency of exercise:  2-3 days/week    Duration of exercise:  45-60 minutes    Taking medications regularly:  Yes    Medication side effects:  Other    Additional concerns today:  No    .Joint Pain  Onset: Left shoulder, left upper back and left medial elbow pain for more than 4 months, No trauma  Description:   Character: Dull ache  Intensity: moderate  Progression of Symptoms: intermittent  Accompanying Signs & Symptoms:  Other symptoms: numbness and swelling  History:   Previous similar pain: no     Precipitating factors:   Trauma or overuse: no   Alleviating factors:  Improved by: nothing    Therapies Tried and outcome: Ibuprofen, naproxen, and Tylenol.  With minimal or no improvement.      Today's PHQ-2 Score:       1/9/2024     3:45 PM   PHQ-2 ( 1999 [employer])   Q1: Little interest or pleasure in doing things 0   Q2: Feeling down, depressed or hopeless 0   PHQ-2 Score 0   Q1: Little interest or pleasure in doing things Not at all   Q2: Feeling down, depressed or hopeless Not at all   PHQ-2 Score 0           Social History     Tobacco Use    Smoking status: Never    Smokeless tobacco: Never   Substance Use Topics    Alcohol use: No             1/9/2024     3:40 PM   Alcohol Use   Prescreen: >3 drinks/day or >7 drinks/week? No          No data to display              Reviewed orders with patient.  Reviewed health maintenance and updated orders accordingly - Yes  BP Readings  from Last 3 Encounters:   01/09/24 110/74   12/12/23 122/86   07/16/23 115/79    Wt Readings from Last 3 Encounters:   01/09/24 96.2 kg (212 lb)   12/12/23 96.2 kg (212 lb)   07/16/23 95.3 kg (210 lb 1.6 oz)                  Patient Active Problem List   Diagnosis    Dyspepsia    Lumbago    Chronic mixed headache syndrome    Uterine leiomyoma, unspecified location    AMA (advanced maternal age) multigravida 35+    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    Somatic dysfunction of lumbar region    Endometritis    GERD (gastroesophageal reflux disease)    Morbid obesity (H)    Pain in joint involving ankle and foot, left     Past Surgical History:   Procedure Laterality Date    D & C  12/05       Social History     Tobacco Use    Smoking status: Never    Smokeless tobacco: Never   Substance Use Topics    Alcohol use: No     Family History   Problem Relation Age of Onset    Allergies Mother         angioedema in family members unspecified    Diabetes Mother     Hypertension Mother     Diabetes Father     Hypertension Father     Diabetes Maternal Grandmother     Hypertension Maternal Grandmother     Asthma Maternal Grandmother     Diabetes Maternal Grandfather     Hypertension Maternal Grandfather     Asthma Maternal Grandfather     Cancer No family hx of     Cerebrovascular Disease No family hx of     Thyroid Disease No family hx of     Glaucoma No family hx of     Macular Degeneration No family hx of     Breast Cancer No family hx of     Colon Cancer No family hx of     Depression No family hx of     Anxiety Disorder No family hx of     Genetic Disorder No family hx of          Current Outpatient Medications   Medication Sig Dispense Refill    Cholecalciferol (VITAMIN D PO) Take 1,000 Units by mouth daily      ferrous sulfate (FEROSUL) 325 (65 Fe) MG tablet TAKE 1 TABLET BY MOUTH EVERY DAY WITH BREAKFAST      meloxicam (MOBIC) 15 MG tablet Take 1 tablet (15 mg) by mouth daily 90 tablet 0     Allergies   Allergen Reactions    Pork Gelatin  [Pork (Porcine) Protein] GI Disturbance     Recent Labs   Lab Test 01/09/24  1718 01/06/23  1534 10/03/22  1432 06/20/22  0747 02/02/22  1309 09/17/21  1044 09/17/21  1044 06/07/19  1439 12/22/17  0753 08/28/17  2112 06/14/16  1333 05/06/16  1030   A1C 5.7*  --   --   --   --   --   --  4.9 5.2  --   --  5.0   LDL  --  120*  --   --   --   --  117*  --   --   --   --  82   HDL  --  57  --   --   --   --  61  --   --   --   --  61   TRIG  --  71  --   --   --   --  105  --   --   --   --  63   ALT  --   --  22 27 23  --   --   --  24  --    < >  --    CR  --   --  0.68 0.68 0.73   < >  --   --  0.73 0.64   < >  --    GFRESTIMATED  --   --  >90 >90 >90   < >  --   --  >90 >90   < >  --    GFRESTBLACK  --   --   --   --   --   --   --   --  >90 >90   < >  --    POTASSIUM  --   --  3.6 3.8 3.8   < >  --   --  4.1 3.2*   < >  --    TSH  --  0.78  --   --  0.80   < > 0.79  --  1.49  --    < >  --     < > = values in this interval not displayed.        Breast Cancer Screening:    FHS-7:       2/2/2023     1:14 PM 3/17/2023     8:34 AM 12/5/2023     3:34 PM   Breast CA Risk Assessment (FHS-7)   Did any of your first-degree relatives have breast or ovarian cancer? No No No   Did any of your relatives have bilateral breast cancer? No No No   Did any man in your family have breast cancer? No No No   Did any woman in your family have breast and ovarian cancer? No No No   Did any woman in your family have breast cancer before age 50 y? No No No   Do you have 2 or more relatives with breast and/or ovarian cancer? No No No   Do you have 2 or more relatives with breast and/or bowel cancer? No No No     click delete button to remove this line now  Mammogram Screening - Offered annual screening and updated Health Maintenance for mutual plan based on risk factor consideration  Pertinent mammograms are reviewed under the imaging tab.          Latest Ref Rng & Units 5/6/2016    10:14 AM 5/6/2016    12:00 AM 8/29/2012    12:00 AM   PAP /  "HPV   PAP (Historical)   NIL  NIL    HPV 16 DNA NEG Negative      HPV 18 DNA NEG Negative      Other HR HPV NEG Negative        Reviewed and updated as needed this visit by clinical staff   Tobacco  Allergies  Meds              Reviewed and updated as needed this visit by Provider      Review of Systems   Constitutional:  Negative for chills and fever.   HENT:  Negative for congestion, ear pain, hearing loss and sore throat.    Eyes:  Negative for pain and visual disturbance.   Respiratory:  Negative for cough and shortness of breath.    Cardiovascular:  Negative for chest pain, palpitations and peripheral edema.   Gastrointestinal:  Negative for abdominal pain, constipation, diarrhea, heartburn, hematochezia and nausea.   Breasts:  Negative for tenderness, breast mass and discharge.   Genitourinary:  Negative for dysuria, frequency, genital sores, hematuria, pelvic pain, urgency and vaginal discharge.   Musculoskeletal:  Positive for back pain. Negative for arthralgias, joint swelling and myalgias.   Skin:  Negative for rash.   Neurological:  Negative for dizziness, weakness, headaches and paresthesias.   Psychiatric/Behavioral:  Negative for mood changes. The patient is not nervous/anxious.           OBJECTIVE:   /74   Pulse 78   Temp 97.2  F (36.2  C) (Temporal)   Ht 1.626 m (5' 4\")   Wt 96.2 kg (212 lb)   LMP 01/08/2024 (Exact Date)   SpO2 100%   BMI 36.39 kg/m    Physical Exam  Musculoskeletal:      Left shoulder: Tenderness present. No swelling. Decreased range of motion.      Left elbow: Swelling present. Tenderness present in medial epicondyle.       GENERAL: healthy, alert and no distress  EYES: Eyes grossly normal to inspection, PERRL and conjunctivae and sclerae normal  HENT: ear canals and TM's normal, nose and mouth without ulcers or lesions  NECK: no adenopathy, no asymmetry, masses, or scars and thyroid normal to palpation  RESP: lungs clear to auscultation - no rales, rhonchi or " wheezes  BREAST: normal without masses, tenderness or nipple discharge and no palpable axillary masses or adenopathy  CV: regular rate and rhythm, normal S1 S2, no S3 or S4, no murmur, click or rub, no peripheral edema and peripheral pulses strong  ABDOMEN: soft, nontender, no hepatosplenomegaly, no masses and bowel sounds normal  SKIN: no suspicious lesions or rashes  NEURO: Normal strength and tone, mentation intact and speech normal  PSYCH: mentation appears normal, affect normal/bright    Results for orders placed or performed in visit on 01/09/24   Hemoglobin A1c     Status: Abnormal   Result Value Ref Range    Hemoglobin A1C 5.7 (H) 0.0 - 5.6 %   ESR: Erythrocyte sedimentation rate     Status: Abnormal   Result Value Ref Range    Erythrocyte Sedimentation Rate 64 (H) 0 - 20 mm/hr   CBC with platelets and differential     Status: Abnormal   Result Value Ref Range    WBC Count 8.0 4.0 - 11.0 10e3/uL    RBC Count 4.07 3.80 - 5.20 10e6/uL    Hemoglobin 11.4 (L) 11.7 - 15.7 g/dL    Hematocrit 33.9 (L) 35.0 - 47.0 %    MCV 83 78 - 100 fL    MCH 28.0 26.5 - 33.0 pg    MCHC 33.6 31.5 - 36.5 g/dL    RDW 12.2 10.0 - 15.0 %    Platelet Count 235 150 - 450 10e3/uL    % Neutrophils 63 %    % Lymphocytes 30 %    % Monocytes 6 %    % Eosinophils 1 %    % Basophils 0 %    % Immature Granulocytes 0 %    Absolute Neutrophils 5.0 1.6 - 8.3 10e3/uL    Absolute Lymphocytes 2.4 0.8 - 5.3 10e3/uL    Absolute Monocytes 0.5 0.0 - 1.3 10e3/uL    Absolute Eosinophils 0.1 0.0 - 0.7 10e3/uL    Absolute Basophils 0.0 0.0 - 0.2 10e3/uL    Absolute Immature Granulocytes 0.0 <=0.4 10e3/uL   CBC with platelets and differential     Status: Abnormal    Narrative    The following orders were created for panel order CBC with platelets and differential.  Procedure                               Abnormality         Status                     ---------                               -----------         ------                     CBC with platelets and  d...[[id number]]  Abnormal            Final result                 Please view results for these tests on the individual orders.         ASSESSMENT/PLAN:   (Z00.00) Routine general medical examination at a health care facility  (primary encounter diagnosis)  Comment: See counseling  Plan: TDAP 10-64Y (ADACEL,BOOSTRIX), PRIMARY CARE         FOLLOW-UP SCHEDULING          (Z13.29) Screening for endocrine disorder  Plan: TSH with free T4 reflex, Ferritin, Basic         metabolic panel  (Ca, Cl, CO2, Creat, Gluc, K,         Na, BUN), Hemoglobin A1c        (Z01.00) Visit for eye and vision exam  Plan: Adult Eye  Referral      (E61.1) Iron deficiency  Plan: CBC with platelets and differential      (Z13.220) Lipid screening      (R70.0) Elevated erythrocyte sedimentation rate  Comment: The patient has had an elevated ESR since I checked 2020.  Autoimmune workup has been negative.  The patient has concerns for multiple joint pain.  She has been treated for low back pain by me physical therapy, and a chiropractor.  She required multiple oral steroids and muscle relaxants for management.  Although her low back pain has resolved, she still has concerns for bilateral plantar fasciitis, now bicep tendinitis and/or Medial epicondylitis and rotator cuff tendinitis.  I am referring her to rheumatology for further workup regarding possible connective tissue disease.  Plan: ESR: Erythrocyte sedimentation rate, Adult         Rheumatology  Referral      (M25.50) Multiple joint pain  Plan: meloxicam (MOBIC) 15 MG tablet      (M75.22) Biceps tendonitis, left    Plan: Physical Therapy Referral        (M75.82) Rotator cuff tendonitis, left  Plan: Physical Therapy Referral      (M77.02) Medial epicondylitis of elbow, left  Plan: Physical Therapy Referral      Patient has been advised of split billing requirements and indicates understanding: Yes      COUNSELING:  Reviewed preventive health counseling, as reflected in patient  "instructions       Regular exercise       Healthy diet/nutrition       Vision screening       Hearing screening       Contraception       Osteoporosis prevention/bone health       Advance Care Planning      BMI:   Estimated body mass index is 36.39 kg/m  as calculated from the following:    Height as of this encounter: 1.626 m (5' 4\").    Weight as of this encounter: 96.2 kg (212 lb).   Weight management plan: Discussed healthy diet and exercise guidelines      She reports that she has never smoked. She has never used smokeless tobacco.          Mele Floyd MD  [facility]  "

## 2024-01-09 NOTE — NURSING NOTE
Prior to immunization administration, verified patients identity using patient s name and date of birth. Please see Immunization Activity for additional information.     Screening Questionnaire for Adult Immunization    Are you sick today?   No   Do you have allergies to medications, food, a vaccine component or latex?   No   Have you ever had a serious reaction after receiving a vaccination?   No   Do you have a long-term health problem with heart, lung, kidney, or metabolic disease (e.g., diabetes), asthma, a blood disorder, no spleen, complement component deficiency, a cochlear implant, or a spinal fluid leak?  Are you on long-term aspirin therapy?   No   Do you have cancer, leukemia, HIV/AIDS, or any other immune system problem?   No   Do you have a parent, brother, or sister with an immune system problem?   No   In the past 3 months, have you taken medications that affect  your immune system, such as prednisone, other steroids, or anticancer drugs; drugs for the treatment of rheumatoid arthritis, Crohn s disease, or psoriasis; or have you had radiation treatments?   No   Have you had a seizure, or a brain or other nervous system problem?   No   During the past year, have you received a transfusion of blood or blood    products, or been given immune (gamma) globulin or antiviral drug?   No   For women: Are you pregnant or is there a chance you could become       pregnant during the next month?   No   Have you received any vaccinations in the past 4 weeks?   No     Immunization questionnaire answers were all negative.      Patient instructed to remain in clinic for 15 minutes afterwards, and to report any adverse reactions.     Screening performed by Barbara Salazar MA on 1/9/2024 at 5:05 PM.

## 2024-01-10 LAB
ANION GAP SERPL CALCULATED.3IONS-SCNC: 12 MMOL/L (ref 7–15)
BUN SERPL-MCNC: 11.2 MG/DL (ref 6–20)
CALCIUM SERPL-MCNC: 9.5 MG/DL (ref 8.6–10)
CHLORIDE SERPL-SCNC: 102 MMOL/L (ref 98–107)
CHOLEST SERPL-MCNC: 160 MG/DL
CREAT SERPL-MCNC: 0.72 MG/DL (ref 0.51–0.95)
DEPRECATED HCO3 PLAS-SCNC: 26 MMOL/L (ref 22–29)
EGFRCR SERPLBLD CKD-EPI 2021: >90 ML/MIN/1.73M2
FASTING STATUS PATIENT QL REPORTED: ABNORMAL
FERRITIN SERPL-MCNC: 48 NG/ML (ref 6–175)
GLUCOSE SERPL-MCNC: 94 MG/DL (ref 70–99)
HDLC SERPL-MCNC: 44 MG/DL
LDLC SERPL CALC-MCNC: 98 MG/DL
NONHDLC SERPL-MCNC: 116 MG/DL
POTASSIUM SERPL-SCNC: 3.7 MMOL/L (ref 3.4–5.3)
SODIUM SERPL-SCNC: 140 MMOL/L (ref 135–145)
TRIGL SERPL-MCNC: 92 MG/DL
TSH SERPL DL<=0.005 MIU/L-ACNC: 1.08 UIU/ML (ref 0.3–4.2)

## 2024-01-17 ENCOUNTER — DOCUMENTATION ONLY (OUTPATIENT)
Dept: FAMILY MEDICINE | Facility: CLINIC | Age: 44
End: 2024-01-17

## 2024-01-17 ENCOUNTER — LAB (OUTPATIENT)
Dept: LAB | Facility: CLINIC | Age: 44
End: 2024-01-17
Payer: COMMERCIAL

## 2024-01-17 ENCOUNTER — THERAPY VISIT (OUTPATIENT)
Dept: PHYSICAL THERAPY | Facility: CLINIC | Age: 44
End: 2024-01-17
Payer: COMMERCIAL

## 2024-01-17 DIAGNOSIS — M25.572 PAIN IN JOINT INVOLVING ANKLE AND FOOT, LEFT: Primary | ICD-10-CM

## 2024-01-17 LAB
HOLD SPECIMEN: NORMAL

## 2024-01-17 PROCEDURE — 97140 MANUAL THERAPY 1/> REGIONS: CPT | Mod: GP | Performed by: PHYSICAL THERAPY ASSISTANT

## 2024-01-17 PROCEDURE — 97033 APP MDLTY 1+IONTPHRSIS EA 15: CPT | Mod: GP | Performed by: PHYSICAL THERAPY ASSISTANT

## 2024-01-17 NOTE — PROGRESS NOTES
Tanya is here for her Lab appointment, but she does not have orders. I`m going to draw extra tube for her please place orders or do the add on on the extra tubes.     Lizzeth Montano.

## 2024-01-17 NOTE — PROGRESS NOTES
The patient isn't due for labs, labs were due on January 19, 2024.  Mele Floyd MD on 1/17/2024 at 2:02 PM

## 2024-01-18 ENCOUNTER — OFFICE VISIT (OUTPATIENT)
Dept: PODIATRY | Facility: CLINIC | Age: 44
End: 2024-01-18
Payer: COMMERCIAL

## 2024-01-18 DIAGNOSIS — M72.2 PLANTAR FASCIITIS, BILATERAL: ICD-10-CM

## 2024-01-18 DIAGNOSIS — M76.72 PERONEAL TENDINITIS OF LEFT LOWER EXTREMITY: Primary | ICD-10-CM

## 2024-01-18 PROCEDURE — 99213 OFFICE O/P EST LOW 20 MIN: CPT | Performed by: PODIATRIST

## 2024-01-18 NOTE — NURSING NOTE
Tanya Denny's chief complaint for this visit includes:  Chief Complaint   Patient presents with    Consult     Foot pain follow up     PCP: Mele Floyd    Referring Provider:  No referring provider defined for this encounter.    [facility] 01/08/2024 (Exact Date)   Data Unavailable     Do you need any medication refills at today's visit? NO    Allergies   Allergen Reactions    Pork Gelatin [Pork (Porcine) Protein] GI Disturbance       Fadumo Walton LPN

## 2024-01-18 NOTE — LETTER
2024         RE: Tanya Denny  [address]  [place] [address]        Dear Colleague,    Thank you for referring your patient, Tanya Denny, to the [facility]. Please see a copy of my visit note below.    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     Somatic dysfunction of lumbar region     Endometritis     GERD (gastroesophageal reflux disease)     Morbid obesity (H)     Pain in joint involving ankle and foot, left     Past Surgical History:   Procedure Laterality Date     D & C       Social History     Socioeconomic History     Marital status:      Spouse name: Not on file     Number of children: Not on file     Years of education: Not on file     Highest education level: Not on file   Occupational History     Not on file   Tobacco Use     Smoking status: Never     Smokeless tobacco: Never   Vaping Use     Vaping Use: Never used   Substance and Sexual Activity     Alcohol use: No     Drug use: No     Sexual activity: Yes     Partners: Male     Birth control/protection: None   Other Topics Concern     Parent/sibling w/ CABG, MI or angioplasty before 65F 55M? No      Service No     Blood Transfusions No     Caffeine Concern No     Occupational Exposure No     Hobby Hazards No     Sleep Concern No     Stress Concern No     Weight Concern No     Special Diet Yes     Back Care No     Exercise Yes     Bike Helmet No     Seat Belt Yes     Self-Exams Yes   Social History Narrative    Mother of 4, all starting school this yearWorks in  can bring her kids there too.  is a teacher , out of work now.         How much exercise per week? 3 days      How much calcium per day? 1 serving      How much caffeine per day? 3 cups    How much vitamin D per day?  prenatals    Do you/your family wear seatbelts?  Yes    Do you/your family use safety helmets? na    Do you/your family use sunscreen?No    Do you/your family keep firearms in the home? No    Do you/your family have a smoke detector(s)? Yes        Do you feel safe in your home? Yes    Has anyone ever touched you in an unwanted manner?      Explain         June 11, 2014 Shaneka Abbasi LPN        Reviewed mikal espinoza 12-         Social Determinants of Health     Financial Resource Strain: Low Risk  (1/9/2024)    Financial Resource Strain      Within the past 12 months, have you or your family members you live with been unable to get utilities (heat, electricity) when it was really needed?: No   Food Insecurity: Low Risk  (1/9/2024)    Food Insecurity      Within the past 12 months, did you worry that your food would run out before you got money to buy more?: No      Within the past 12 months, did the food you bought just not last and you didn t have money to get more?: No   Transportation Needs: Low Risk  (1/9/2024)    Transportation Needs      Within the past 12 months, has lack of transportation kept you from medical appointments, getting your medicines, non-medical meetings or appointments, work, or from getting things that you need?: No   Physical Activity: Not on file   Stress: Not on file   Social Connections: Not on file   Interpersonal Safety: Low Risk  (12/12/2023)    Interpersonal Safety      Do you feel physically and emotionally safe where you currently live?: Yes      Within the past 12 months, have you been hit, slapped, kicked or otherwise physically hurt by someone?: No      Within the past 12 months, have you been humiliated or emotionally abused in other ways by your partner or ex-partner?: No   Housing Stability: Low Risk  (1/9/2024)    Housing Stability      Do you have housing? : Yes       Are you worried about losing your housing?: No   Recent Concern: Housing Stability - High Risk (12/12/2023)    Housing Stability      Do you have housing? : No      Are you worried about losing your housing?: No     Family History   Problem Relation Age of Onset     Allergies Mother         angioedema in family members unspecified     Diabetes Mother      Hypertension Mother      Diabetes Father      Hypertension Father      Diabetes Maternal Grandmother      Hypertension Maternal Grandmother      Asthma Maternal Grandmother      Diabetes Maternal Grandfather      Hypertension Maternal Grandfather      Asthma Maternal Grandfather      Cancer No family hx of      Cerebrovascular Disease No family hx of      Thyroid Disease No family hx of      Glaucoma No family hx of      Macular Degeneration No family hx of      Breast Cancer No family hx of      Colon Cancer No family hx of      Depression No family hx of      Anxiety Disorder No family hx of      Genetic Disorder No family hx of            Subjective findings- 44-year-old returns clinic for plantar fasciitis and peroneal tendinopathy left.  She relates she is doing physical therapy, I reviewed physical therapy notes, relates she is getting heel pain on the right to a lesser degree than heel pain on the left, the peroneal tendon pain is better, feels the pain is moved from the the central band of plantar fascia to the plantar heel and all around the heel.  Relates to using the ankle brace, has used a night splint, is doing the stretching, is using the [place] as needed but that makes her tired.    Objective findings- DP and PT are 2 out of 4 bilaterally.  Has plantar heel pain left greater than right.  There is no erythema, no edema, no drainage, no odor, no calor, no gross tendon voids bilaterally.    Assessment and plan- Plantar Fasciitis left greater than right.  Peroneal tendinopathy is doing better.  Diagnosis and treatment options discussed with the patient.   She request corticosteroid injection.  She relates she does have to work today and she is on her feet so she preferred to do this tomorrow as she has the weekend off.  Procedure, potential risks, expected benefits, expected outcomes discussed with her.  Continue physical therapy with iontophoresis.  Return to clinic and see me tomorrow as scheduled.              Low level of medical decision making.      Again, thank you for allowing me to participate in the care of your patient.        Sincerely,        Des Mckeon DPM

## 2024-01-18 NOTE — PROGRESS NOTES
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    Somatic dysfunction of lumbar region    Endometritis    GERD (gastroesophageal reflux disease)    Morbid obesity (H)    Pain in joint involving ankle and foot, left     Past Surgical History:   Procedure Laterality Date    D & C       Social History     Socioeconomic History    Marital status:      Spouse name: Not on file    Number of children: Not on file    Years of education: Not on file    Highest education level: Not on file   Occupational History    Not on file   Tobacco Use    Smoking status: Never    Smokeless tobacco: Never   Vaping Use    Vaping Use: Never used   Substance and Sexual Activity    Alcohol use: No    Drug use: No    Sexual activity: Yes     Partners: Male     Birth control/protection: None   Other Topics Concern    Parent/sibling w/ CABG, MI or angioplasty before 65F 55M? No     Service No    Blood Transfusions No    Caffeine Concern No    Occupational Exposure No    Hobby Hazards No    Sleep Concern No    Stress Concern No    Weight Concern No    Special Diet Yes    Back Care No    Exercise Yes    Bike Helmet No    Seat Belt Yes    Self-Exams Yes   Social History Narrative    Mother of 4, all starting school this yearWorks in  can bring her kids there too.  is a teacher , out of work now.         How much exercise per week? 3 days     How much calcium per day? 1 serving      How much caffeine per day? 3 cups    How much vitamin D per day?  prenatals    Do you/your family wear seatbelts?  Yes    Do you/your family use safety helmets? na    Do you/your family use sunscreen?No    Do you/your family keep firearms in the home? No    Do you/your  family have a smoke detector(s)? Yes        Do you feel safe in your home? Yes    Has anyone ever touched you in an unwanted manner?      Explain         June 11, 2014 Shaneka Abbasi LPN        Reviewed mikal espinoza 12-         Social Determinants of Health     Financial Resource Strain: Low Risk  (1/9/2024)    Financial Resource Strain     Within the past 12 months, have you or your family members you live with been unable to get utilities (heat, electricity) when it was really needed?: No   Food Insecurity: Low Risk  (1/9/2024)    Food Insecurity     Within the past 12 months, did you worry that your food would run out before you got money to buy more?: No     Within the past 12 months, did the food you bought just not last and you didn t have money to get more?: No   Transportation Needs: Low Risk  (1/9/2024)    Transportation Needs     Within the past 12 months, has lack of transportation kept you from medical appointments, getting your medicines, non-medical meetings or appointments, work, or from getting things that you need?: No   Physical Activity: Not on file   Stress: Not on file   Social Connections: Not on file   Interpersonal Safety: Low Risk  (12/12/2023)    Interpersonal Safety     Do you feel physically and emotionally safe where you currently live?: Yes     Within the past 12 months, have you been hit, slapped, kicked or otherwise physically hurt by someone?: No     Within the past 12 months, have you been humiliated or emotionally abused in other ways by your partner or ex-partner?: No   Housing Stability: Low Risk  (1/9/2024)    Housing Stability     Do you have housing? : Yes     Are you worried about losing your housing?: No   Recent Concern: Housing Stability - High Risk (12/12/2023)    Housing Stability     Do you have housing? : No     Are you worried about losing your housing?: No     Family History   Problem Relation Age of Onset    Allergies Mother         angioedema in family members  unspecified    Diabetes Mother     Hypertension Mother     Diabetes Father     Hypertension Father     Diabetes Maternal Grandmother     Hypertension Maternal Grandmother     Asthma Maternal Grandmother     Diabetes Maternal Grandfather     Hypertension Maternal Grandfather     Asthma Maternal Grandfather     Cancer No family hx of     Cerebrovascular Disease No family hx of     Thyroid Disease No family hx of     Glaucoma No family hx of     Macular Degeneration No family hx of     Breast Cancer No family hx of     Colon Cancer No family hx of     Depression No family hx of     Anxiety Disorder No family hx of     Genetic Disorder No family hx of            Subjective findings- 44-year-old returns clinic for plantar fasciitis and peroneal tendinopathy left.  She relates she is doing physical therapy, I reviewed physical therapy notes, relates she is getting heel pain on the right to a lesser degree than heel pain on the left, the peroneal tendon pain is better, feels the pain is moved from the the central band of plantar fascia to the plantar heel and all around the heel.  Relates to using the ankle brace, has used a night splint, is doing the stretching, is using the [place] as needed but that makes her tired.    Objective findings- DP and PT are 2 out of 4 bilaterally.  Has plantar heel pain left greater than right.  There is no erythema, no edema, no drainage, no odor, no calor, no gross tendon voids bilaterally.    Assessment and plan- Plantar Fasciitis left greater than right.  Peroneal tendinopathy is doing better.  Diagnosis and treatment options discussed with the patient.  She request corticosteroid injection.  She relates she does have to work today and she is on her feet so she preferred to do this tomorrow as she has the weekend off.  Procedure, potential risks, expected benefits, expected outcomes discussed with her.  Continue physical therapy with iontophoresis.  Return to clinic and see me tomorrow as  scheduled.              Low level of medical decision making.

## 2024-01-19 ENCOUNTER — OFFICE VISIT (OUTPATIENT)
Dept: PODIATRY | Facility: CLINIC | Age: 44
End: 2024-01-19
Payer: COMMERCIAL

## 2024-01-19 DIAGNOSIS — M72.2 PLANTAR FASCIITIS OF LEFT FOOT: Primary | ICD-10-CM

## 2024-01-19 PROCEDURE — 20550 NJX 1 TENDON SHEATH/LIGAMENT: CPT | Performed by: PODIATRIST

## 2024-01-19 RX ORDER — DEXAMETHASONE SODIUM PHOSPHATE 4 MG/ML
1 INJECTION, SOLUTION INTRA-ARTICULAR; INTRALESIONAL; INTRAMUSCULAR; INTRAVENOUS; SOFT TISSUE ONCE
Status: COMPLETED | OUTPATIENT
Start: 2024-01-19 | End: 2024-01-19

## 2024-01-19 RX ADMIN — DEXAMETHASONE SODIUM PHOSPHATE 4 MG: 4 INJECTION, SOLUTION INTRA-ARTICULAR; INTRALESIONAL; INTRAMUSCULAR; INTRAVENOUS; SOFT TISSUE at 13:23

## 2024-01-19 NOTE — PROGRESS NOTES
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    Somatic dysfunction of lumbar region    Endometritis    GERD (gastroesophageal reflux disease)    Morbid obesity (H)    Pain in joint involving ankle and foot, left     Past Surgical History:   Procedure Laterality Date    D & C       Social History     Socioeconomic History    Marital status:      Spouse name: Not on file    Number of children: Not on file    Years of education: Not on file    Highest education level: Not on file   Occupational History    Not on file   Tobacco Use    Smoking status: Never    Smokeless tobacco: Never   Vaping Use    Vaping Use: Never used   Substance and Sexual Activity    Alcohol use: No    Drug use: No    Sexual activity: Yes     Partners: Male     Birth control/protection: None   Other Topics Concern    Parent/sibling w/ CABG, MI or angioplasty before 65F 55M? No     Service No    Blood Transfusions No    Caffeine Concern No    Occupational Exposure No    Hobby Hazards No    Sleep Concern No    Stress Concern No    Weight Concern No    Special Diet Yes    Back Care No    Exercise Yes    Bike Helmet No    Seat Belt Yes    Self-Exams Yes   Social History Narrative    Mother of 4, all starting school this yearWorks in  can bring her kids there too.  is a teacher , out of work now.         How much exercise per week? 3 days     How much calcium per day? 1 serving      How much caffeine per day? 3 cups    How much vitamin D per day?  prenatals    Do you/your family wear seatbelts?  Yes    Do you/your family use safety helmets? na    Do you/your family use sunscreen?No    Do you/your family keep firearms in the home? No    Do you/your  family have a smoke detector(s)? Yes        Do you feel safe in your home? Yes    Has anyone ever touched you in an unwanted manner?      Explain         June 11, 2014 Shaneka Abbasi LPN        Reviewed mikal espinoza 12-         Social Determinants of Health     Financial Resource Strain: Low Risk  (1/9/2024)    Financial Resource Strain     Within the past 12 months, have you or your family members you live with been unable to get utilities (heat, electricity) when it was really needed?: No   Food Insecurity: Low Risk  (1/9/2024)    Food Insecurity     Within the past 12 months, did you worry that your food would run out before you got money to buy more?: No     Within the past 12 months, did the food you bought just not last and you didn t have money to get more?: No   Transportation Needs: Low Risk  (1/9/2024)    Transportation Needs     Within the past 12 months, has lack of transportation kept you from medical appointments, getting your medicines, non-medical meetings or appointments, work, or from getting things that you need?: No   Physical Activity: Not on file   Stress: Not on file   Social Connections: Not on file   Interpersonal Safety: Low Risk  (12/12/2023)    Interpersonal Safety     Do you feel physically and emotionally safe where you currently live?: Yes     Within the past 12 months, have you been hit, slapped, kicked or otherwise physically hurt by someone?: No     Within the past 12 months, have you been humiliated or emotionally abused in other ways by your partner or ex-partner?: No   Housing Stability: Low Risk  (1/9/2024)    Housing Stability     Do you have housing? : Yes     Are you worried about losing your housing?: No   Recent Concern: Housing Stability - High Risk (12/12/2023)    Housing Stability     Do you have housing? : No     Are you worried about losing your housing?: No     Family History   Problem Relation Age of Onset    Allergies Mother         angioedema in family members  unspecified    Diabetes Mother     Hypertension Mother     Diabetes Father     Hypertension Father     Diabetes Maternal Grandmother     Hypertension Maternal Grandmother     Asthma Maternal Grandmother     Diabetes Maternal Grandfather     Hypertension Maternal Grandfather     Asthma Maternal Grandfather     Cancer No family hx of     Cerebrovascular Disease No family hx of     Thyroid Disease No family hx of     Glaucoma No family hx of     Macular Degeneration No family hx of     Breast Cancer No family hx of     Colon Cancer No family hx of     Depression No family hx of     Anxiety Disorder No family hx of     Genetic Disorder No family hx of          Subjective findings- 44-year-old returns clinic for corticosteroid injection for plantar fasciitis left foot.    Objective findings- DP PT are 2 out of 4 left, has pain on palpation of plantar left heel.    Assessment and plan- Plantar Fasciitis left.  Diagnosis and treatment options discussed with the patient.  Procedure, potential risk, expected benefits, expected outcomes and follow-up discussed with her.  Consent signed.  The left heel was prepped and injected a trigger point with 1 cc of dexamethasone sodium phosphate 4 mg/mL and 2 cc of 1% lidocaine plain mix.  This is first injection.  The patient tolerated the procedure well with no complications.  Return to clinic and see me in 2 weeks.

## 2024-01-19 NOTE — NURSING NOTE
[facility]   ORTHOPEDICS & SPORTS MEDICINE  [address] [place] [address]  Dept: [phone]  ______________________________________________________________________________    Patient: Tanya Denny   : 1980   MRN: [id number]   2024    INVASIVE PROCEDURE SAFETY CHECKLIST    Date: 24   Procedure:Left Heel injection  Patient Name: Tanya Denny  MRN: [id number]  YOB: 1980    Action: Complete sections as appropriate. Any discrepancy results in a HARD COPY until resolved.     PRE PROCEDURE:  Patient ID verified with 2 identifiers (name and  or MRN): Yes  Procedure and site verified with patient/designee (when able): Yes  Accurate consent documentation in medical record: Yes  H&P (or appropriate assessment) documented in medical record: NA  H&P must be up to 20 days prior to procedure and updates within 24 hours of procedure as applicable: NA  Relevant diagnostic and radiology test results appropriately labeled and displayed as applicable: Yes  Procedure site(s) marked with provider initials: Yes    TIMEOUT:  Time-Out performed immediately prior to starting procedure, including verbal and active participation of all team members addressing the following:Yes  * Correct patient identify  * Confirmed that the correct side and site are marked  * An accurate procedure consent form  * Agreement on the procedure to be done  * Correct patient position  * Relevant images and results are properly labeled and appropriately displayed  * The need to administer antibiotics or fluids for irrigation purposes during the procedure as applicable   * Safety precautions based on patient history or medication use    DURING PROCEDURE: Verification of correct person, site, and procedures any time the responsibility for care of the patient is transferred to another member of the care team.       Prior to injection, verified patient identity using patient's name and  date of birth.  Due to injection administration, patient instructed to remain in clinic for 15 minutes  afterwards, and to report any adverse reaction to me immediately.    Left heel injection    Drug Amount Wasted:  None.  Vial/Syringe: Single dose vial  Expiration Date:  8/31/24      Fadumo Walton LPN  January 19, 2024

## 2024-01-19 NOTE — LETTER
2024         RE: Tanya Denny  [address]  [place] [address]        Dear Colleague,    Thank you for referring your patient, Tanya Denny, to the [facility]. Please see a copy of my visit note below.    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     Somatic dysfunction of lumbar region     Endometritis     GERD (gastroesophageal reflux disease)     Morbid obesity (H)     Pain in joint involving ankle and foot, left     Past Surgical History:   Procedure Laterality Date     D & C       Social History     Socioeconomic History     Marital status:      Spouse name: Not on file     Number of children: Not on file     Years of education: Not on file     Highest education level: Not on file   Occupational History     Not on file   Tobacco Use     Smoking status: Never     Smokeless tobacco: Never   Vaping Use     Vaping Use: Never used   Substance and Sexual Activity     Alcohol use: No     Drug use: No     Sexual activity: Yes     Partners: Male     Birth control/protection: None   Other Topics Concern     Parent/sibling w/ CABG, MI or angioplasty before 65F 55M? No      Service No     Blood Transfusions No     Caffeine Concern No     Occupational Exposure No     Hobby Hazards No     Sleep Concern No     Stress Concern No     Weight Concern No     Special Diet Yes     Back Care No     Exercise Yes     Bike Helmet No     Seat Belt Yes     Self-Exams Yes   Social History Narrative    Mother of 4, all starting school this yearWorks in  can bring her kids there too.  is a teacher , out of work now.         How much exercise per week? 3 days      How much calcium per day? 1 serving      How much caffeine per day? 3 cups    How much vitamin D per day?  prenatals    Do you/your family wear seatbelts?  Yes    Do you/your family use safety helmets? na    Do you/your family use sunscreen?No    Do you/your family keep firearms in the home? No    Do you/your family have a smoke detector(s)? Yes        Do you feel safe in your home? Yes    Has anyone ever touched you in an unwanted manner?      Explain         June 11, 2014 Shaneka Abbasi LPN        Reviewed mikal espinoza 12-         Social Determinants of Health     Financial Resource Strain: Low Risk  (1/9/2024)    Financial Resource Strain      Within the past 12 months, have you or your family members you live with been unable to get utilities (heat, electricity) when it was really needed?: No   Food Insecurity: Low Risk  (1/9/2024)    Food Insecurity      Within the past 12 months, did you worry that your food would run out before you got money to buy more?: No      Within the past 12 months, did the food you bought just not last and you didn t have money to get more?: No   Transportation Needs: Low Risk  (1/9/2024)    Transportation Needs      Within the past 12 months, has lack of transportation kept you from medical appointments, getting your medicines, non-medical meetings or appointments, work, or from getting things that you need?: No   Physical Activity: Not on file   Stress: Not on file   Social Connections: Not on file   Interpersonal Safety: Low Risk  (12/12/2023)    Interpersonal Safety      Do you feel physically and emotionally safe where you currently live?: Yes      Within the past 12 months, have you been hit, slapped, kicked or otherwise physically hurt by someone?: No      Within the past 12 months, have you been humiliated or emotionally abused in other ways by your partner or ex-partner?: No   Housing Stability: Low Risk  (1/9/2024)    Housing Stability      Do you have housing? : Yes       Are you worried about losing your housing?: No   Recent Concern: Housing Stability - High Risk (12/12/2023)    Housing Stability      Do you have housing? : No      Are you worried about losing your housing?: No     Family History   Problem Relation Age of Onset     Allergies Mother         angioedema in family members unspecified     Diabetes Mother      Hypertension Mother      Diabetes Father      Hypertension Father      Diabetes Maternal Grandmother      Hypertension Maternal Grandmother      Asthma Maternal Grandmother      Diabetes Maternal Grandfather      Hypertension Maternal Grandfather      Asthma Maternal Grandfather      Cancer No family hx of      Cerebrovascular Disease No family hx of      Thyroid Disease No family hx of      Glaucoma No family hx of      Macular Degeneration No family hx of      Breast Cancer No family hx of      Colon Cancer No family hx of      Depression No family hx of      Anxiety Disorder No family hx of      Genetic Disorder No family hx of          Subjective findings- 44-year-old returns clinic for corticosteroid injection for plantar fasciitis left foot.    Objective findings- DP PT are 2 out of 4 left, has pain on palpation of plantar left heel.    Assessment and plan- Plantar Fasciitis left.  Diagnosis and treatment options discussed with the patient.  Procedure, potential risk, expected benefits, expected outcomes and follow-up discussed with her.  Consent signed.  The left heel was prepped and injected a trigger point with 1 cc of dexamethasone sodium phosphate 4 mg/mL and 2 cc of 1% lidocaine plain mix.  This is first injection.  The patient tolerated the procedure well with no complications.  Return to clinic and see me in 2 weeks.            Again, thank you for allowing me to participate in the care of your patient.        Sincerely,        Des Mckeon DPM

## 2024-01-19 NOTE — NURSING NOTE
Tanya Denny's chief complaint for this visit includes:  Chief Complaint   Patient presents with    Consult     Cortisone foot injeciton     PCP: Mele Floyd    Referring Provider:  No referring provider defined for this encounter.    LMP 01/08/2024 (Exact Date)   Data Unavailable     Do you need any medication refills at today's visit? NO    Allergies   Allergen Reactions    Pork Gelatin [Pork (Porcine) Protein] GI Disturbance       Fadumo Walton LPN

## 2024-01-23 ENCOUNTER — THERAPY VISIT (OUTPATIENT)
Dept: PHYSICAL THERAPY | Facility: CLINIC | Age: 44
End: 2024-01-23
Attending: FAMILY MEDICINE
Payer: COMMERCIAL

## 2024-01-23 DIAGNOSIS — M75.82 ROTATOR CUFF TENDONITIS, LEFT: ICD-10-CM

## 2024-01-23 DIAGNOSIS — M77.02 MEDIAL EPICONDYLITIS OF ELBOW, LEFT: ICD-10-CM

## 2024-01-23 DIAGNOSIS — M75.22 BICEPS TENDONITIS, LEFT: ICD-10-CM

## 2024-01-23 PROCEDURE — 97530 THERAPEUTIC ACTIVITIES: CPT | Mod: GP | Performed by: PHYSICAL THERAPIST

## 2024-01-23 PROCEDURE — 97110 THERAPEUTIC EXERCISES: CPT | Mod: GP | Performed by: PHYSICAL THERAPIST

## 2024-01-23 PROCEDURE — 97161 PT EVAL LOW COMPLEX 20 MIN: CPT | Mod: GP | Performed by: PHYSICAL THERAPIST

## 2024-01-23 NOTE — PROGRESS NOTES
"PHYSICAL THERAPY EVALUATION  Type of Visit: Evaluation    See electronic medical record for Abuse and Falls Screening details.    Subjective       Presenting condition or subjective complaint: Patient reports left arm pain from the elbow to shoulder primarily. The pain has been going on for \"a long time\" without any specific cause. There is swelling that comes and goes in her arm but only the left arm. She describes the pain as \"poking\" and \"tight or throbbing\". It limits her ability to clean, cook or take care of children. Only past trauma in the area was a broken left wrist 2 decades ago.   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    Patient goals for therapy: Take care of household and daily duties without pain; work better    Pain assessment: See objective evaluation for additional pain details     Objective   PAIN: Pain Level at Rest: 0/10  Pain Level with Use: 10/10  Pain Location: elbow and bicep area (anterior primarily; medial epicondyle area primarily)  Pain Quality: Aching, Dull, and poking  Pain Frequency: constant  Pain is Worst: does not change throughout the day  Pain is Exacerbated By: lifting, carrying, reaching, overuse in general with arm especially cleaning  Pain is Relieved By: nothing helps other than avoiding provoking tasks  Pain Progression: Unchanged  INTEGUMENTARY (edema, incisions): visually a pocket of swelling near medial epicondyle, about 0.5 cm difference in diameter between arms  POSTURE: WFL  ROM:  Elbow and wrist AROM WNL except end range extension painful in anterior elbow and bicep tendon.    STRENGTH: elbow ext 5/5 B, elbow flex 4+/5 L and 5/5 R; shoulder flexion 5/5 R and 4/5 L  FLEXIBILITY: "   SPECIAL TESTS:  +Speed's, - HK, - Neer's, - Medial and Lateral epicondylitis  PALPATION:  pec minor/major, bicep tendon, medial epicondyle, bicep muscle belly and bicep insertion primarily; many other areas minorly sore  JOINT MOBILITY: WNL,    CERVICAL SCREEN: WNL  THORACIC SCREEN: NT  SHOULDER SCREEN: AROM WNL    Assessment & Plan   CLINICAL IMPRESSIONS  Medical Diagnosis: Left elbow pain, chronic    Treatment Diagnosis: Left elbow pain, chronic   Impression/Assessment: Patient is a 44 year old female with left elbow and shoulder area complaints.  The following significant findings have been identified: Pain, Decreased ROM/flexibility, Decreased joint mobility, Decreased strength, Impaired muscle performance, Decreased activity tolerance, and Impaired posture. These impairments interfere with their ability to perform work tasks, recreational activities, and household chores as compared to previous level of function.     Clinical Decision Making (Complexity):  Clinical Presentation: Stable/Uncomplicated  Clinical Presentation Rationale: based on medical and personal factors listed in PT evaluation  Clinical Decision Making (Complexity): Low complexity    PLAN OF CARE  Treatment Interventions:  Modalities: Dry Needling, E-stim, Hot Pack, Ultrasound  Interventions: Manual Therapy, Neuromuscular Re-education, Therapeutic Activity, Therapeutic Exercise, Self-Care/Home Management    Long Term Goals     PT Goal 2  Goal Identifier: LTG elbow  Goal Description: Patient will be able to lift and carry 30 pounds without pain in elbow  Rationale: to maximize safety and independence within the community;to maximize safety and independence with performance of ADLs and functional tasks  Target Date: 04/16/24      Frequency of Treatment: 1x/week  Duration of Treatment: 4 weeks then 2x/month for 2 months    Recommended Referrals to Other Professionals:  none  Education Assessment:   Learner/Method: No Barriers to  Learning;Pictures/Video;Demonstration;Reading;Listening;Patient    Risks and benefits of evaluation/treatment have been explained.   Patient/Family/caregiver agrees with Plan of Care.     Evaluation Time:     PT Eval, Low Complexity Minutes (26075): 26       Signing Clinician: Michael Sparks PT      [facility]                                                                                   OUTPATIENT PHYSICAL THERAPY      PLAN OF TREATMENT FOR OUTPATIENT REHABILITATION   Patient's Last Name, First Name, Tanya Callaway  Tera YOB: 1980   Provider's Name   [facility]   Medical Record No.  [id number]     Onset Date: 01/09/24 (chronic; date of order)  Start of Care Date: 01/23/24     Medical Diagnosis:  Left elbow pain, chronic      PT Treatment Diagnosis:  Left elbow pain, chronic Plan of Treatment  Frequency/Duration: 1x/week/ 4 weeks then 2x/month for 2 months    Certification date from 01/23/24 to 04/16/24         See note for plan of treatment details and functional goals     Michael Sparks, PT                         I CERTIFY THE NEED FOR THESE SERVICES FURNISHED UNDER        THIS PLAN OF TREATMENT AND WHILE UNDER MY CARE     (Physician attestation of this document indicates review and certification of the therapy plan).              Referring Provider:  Mele Floyd    Initial Assessment  See Epic Evaluation- Start of Care Date: 01/23/24

## 2024-02-02 ENCOUNTER — OFFICE VISIT (OUTPATIENT)
Dept: PODIATRY | Facility: CLINIC | Age: 44
End: 2024-02-02
Payer: COMMERCIAL

## 2024-02-02 DIAGNOSIS — M72.2 PLANTAR FASCIITIS OF LEFT FOOT: Primary | ICD-10-CM

## 2024-02-02 PROCEDURE — 99212 OFFICE O/P EST SF 10 MIN: CPT | Performed by: PODIATRIST

## 2024-02-02 NOTE — NURSING NOTE
Tanya Denny's chief complaint for this visit includes:  Chief Complaint   Patient presents with    RECHECK     Foot pain follow up.      PCP: Mele Floyd    Referring Provider:  No referring provider defined for this encounter.    LMP 01/08/2024 (Exact Date)   Data Unavailable        Allergies   Allergen Reactions    Pork Gelatin [Pork (Porcine) Protein] GI Disturbance         Do you need any medication refills at today's visit?

## 2024-02-02 NOTE — PROGRESS NOTES
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    Somatic dysfunction of lumbar region    Endometritis    GERD (gastroesophageal reflux disease)    Morbid obesity (H)    Pain in joint involving ankle and foot, left     Past Surgical History:   Procedure Laterality Date    D & C       Social History     Socioeconomic History    Marital status:      Spouse name: Not on file    Number of children: Not on file    Years of education: Not on file    Highest education level: Not on file   Occupational History    Not on file   Tobacco Use    Smoking status: Never    Smokeless tobacco: Never   Vaping Use    Vaping Use: Never used   Substance and Sexual Activity    Alcohol use: No    Drug use: No    Sexual activity: Yes     Partners: Male     Birth control/protection: None   Other Topics Concern    Parent/sibling w/ CABG, MI or angioplasty before 65F 55M? No     Service No    Blood Transfusions No    Caffeine Concern No    Occupational Exposure No    Hobby Hazards No    Sleep Concern No    Stress Concern No    Weight Concern No    Special Diet Yes    Back Care No    Exercise Yes    Bike Helmet No    Seat Belt Yes    Self-Exams Yes   Social History Narrative    Mother of 4, all starting school this yearWorks in  can bring her kids there too.  is a teacher , out of work now.         How much exercise per week? 3 days     How much calcium per day? 1 serving      How much caffeine per day? 3 cups    How much vitamin D per day?  prenatals    Do you/your family wear seatbelts?  Yes    Do you/your family use safety helmets? na    Do you/your family use sunscreen?No    Do you/your family keep firearms in the home? No    Do you/your  family have a smoke detector(s)? Yes        Do you feel safe in your home? Yes    Has anyone ever touched you in an unwanted manner?      Explain         June 11, 2014 Shaneka Abbasi LPN        Reviewed mikal espinoza 12-         Social Determinants of Health     Financial Resource Strain: Low Risk  (1/9/2024)    Financial Resource Strain     Within the past 12 months, have you or your family members you live with been unable to get utilities (heat, electricity) when it was really needed?: No   Food Insecurity: Low Risk  (1/9/2024)    Food Insecurity     Within the past 12 months, did you worry that your food would run out before you got money to buy more?: No     Within the past 12 months, did the food you bought just not last and you didn t have money to get more?: No   Transportation Needs: Low Risk  (1/9/2024)    Transportation Needs     Within the past 12 months, has lack of transportation kept you from medical appointments, getting your medicines, non-medical meetings or appointments, work, or from getting things that you need?: No   Physical Activity: Not on file   Stress: Not on file   Social Connections: Not on file   Interpersonal Safety: Low Risk  (12/12/2023)    Interpersonal Safety     Do you feel physically and emotionally safe where you currently live?: Yes     Within the past 12 months, have you been hit, slapped, kicked or otherwise physically hurt by someone?: No     Within the past 12 months, have you been humiliated or emotionally abused in other ways by your partner or ex-partner?: No   Housing Stability: Low Risk  (1/9/2024)    Housing Stability     Do you have housing? : Yes     Are you worried about losing your housing?: No   Recent Concern: Housing Stability - High Risk (12/12/2023)    Housing Stability     Do you have housing? : No     Are you worried about losing your housing?: No     Family History   Problem Relation Age of Onset    Allergies Mother         angioedema in family members  unspecified    Diabetes Mother     Hypertension Mother     Diabetes Father     Hypertension Father     Diabetes Maternal Grandmother     Hypertension Maternal Grandmother     Asthma Maternal Grandmother     Diabetes Maternal Grandfather     Hypertension Maternal Grandfather     Asthma Maternal Grandfather     Cancer No family hx of     Cerebrovascular Disease No family hx of     Thyroid Disease No family hx of     Glaucoma No family hx of     Macular Degeneration No family hx of     Breast Cancer No family hx of     Colon Cancer No family hx of     Depression No family hx of     Anxiety Disorder No family hx of     Genetic Disorder No family hx of            Subjective findings- 44-year-old returns clinic for plantar fasciitis left foot.  She relates that shot helped especially the first week and now she is getting some symptoms that are returning.    Objective findings- Vascular status intact left, has pain on palpation of the left plantar heel, no erythema, no drainage, no odor, no calor, no gross tendon voids.    Assessment and plan- Plantar Fasciitis left.  Diagnosis and treatment options discussed with the patient.  She relates she is going to do physical therapy again but she is waiting to get a scheduled appointment.  Previous notes reviewed.  Return to clinic and see me as needed.          Straightforward medical decision making.

## 2024-02-02 NOTE — LETTER
2024         RE: Tanya Denny  [address]  [place] [address]        Dear Colleague,    Thank you for referring your patient, Tanya Denny, to the [facility]. Please see a copy of my visit note below.    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     Somatic dysfunction of lumbar region     Endometritis     GERD (gastroesophageal reflux disease)     Morbid obesity (H)     Pain in joint involving ankle and foot, left     Past Surgical History:   Procedure Laterality Date     D & C       Social History     Socioeconomic History     Marital status:      Spouse name: Not on file     Number of children: Not on file     Years of education: Not on file     Highest education level: Not on file   Occupational History     Not on file   Tobacco Use     Smoking status: Never     Smokeless tobacco: Never   Vaping Use     Vaping Use: Never used   Substance and Sexual Activity     Alcohol use: No     Drug use: No     Sexual activity: Yes     Partners: Male     Birth control/protection: None   Other Topics Concern     Parent/sibling w/ CABG, MI or angioplasty before 65F 55M? No      Service No     Blood Transfusions No     Caffeine Concern No     Occupational Exposure No     Hobby Hazards No     Sleep Concern No     Stress Concern No     Weight Concern No     Special Diet Yes     Back Care No     Exercise Yes     Bike Helmet No     Seat Belt Yes     Self-Exams Yes   Social History Narrative    Mother of 4, all starting school this yearWorks in  can bring her kids there too.  is a teacher , out of work now.         How much exercise per week? 3 days      How much calcium per day? 1 serving      How much caffeine per day? 3 cups    How much vitamin D per day?  prenatals    Do you/your family wear seatbelts?  Yes    Do you/your family use safety helmets? na    Do you/your family use sunscreen?No    Do you/your family keep firearms in the home? No    Do you/your family have a smoke detector(s)? Yes        Do you feel safe in your home? Yes    Has anyone ever touched you in an unwanted manner?      Explain         June 11, 2014 Shaneka Abbasi LPN        Reviewed mikal espinoza 12-         Social Determinants of Health     Financial Resource Strain: Low Risk  (1/9/2024)    Financial Resource Strain      Within the past 12 months, have you or your family members you live with been unable to get utilities (heat, electricity) when it was really needed?: No   Food Insecurity: Low Risk  (1/9/2024)    Food Insecurity      Within the past 12 months, did you worry that your food would run out before you got money to buy more?: No      Within the past 12 months, did the food you bought just not last and you didn t have money to get more?: No   Transportation Needs: Low Risk  (1/9/2024)    Transportation Needs      Within the past 12 months, has lack of transportation kept you from medical appointments, getting your medicines, non-medical meetings or appointments, work, or from getting things that you need?: No   Physical Activity: Not on file   Stress: Not on file   Social Connections: Not on file   Interpersonal Safety: Low Risk  (12/12/2023)    Interpersonal Safety      Do you feel physically and emotionally safe where you currently live?: Yes      Within the past 12 months, have you been hit, slapped, kicked or otherwise physically hurt by someone?: No      Within the past 12 months, have you been humiliated or emotionally abused in other ways by your partner or ex-partner?: No   Housing Stability: Low Risk  (1/9/2024)    Housing Stability      Do you have housing? : Yes       Are you worried about losing your housing?: No   Recent Concern: Housing Stability - High Risk (12/12/2023)    Housing Stability      Do you have housing? : No      Are you worried about losing your housing?: No     Family History   Problem Relation Age of Onset     Allergies Mother         angioedema in family members unspecified     Diabetes Mother      Hypertension Mother      Diabetes Father      Hypertension Father      Diabetes Maternal Grandmother      Hypertension Maternal Grandmother      Asthma Maternal Grandmother      Diabetes Maternal Grandfather      Hypertension Maternal Grandfather      Asthma Maternal Grandfather      Cancer No family hx of      Cerebrovascular Disease No family hx of      Thyroid Disease No family hx of      Glaucoma No family hx of      Macular Degeneration No family hx of      Breast Cancer No family hx of      Colon Cancer No family hx of      Depression No family hx of      Anxiety Disorder No family hx of      Genetic Disorder No family hx of            Subjective findings- 44-year-old returns clinic for plantar fasciitis left foot.  She relates that shot helped especially the first week and now she is getting some symptoms that are returning.    Objective findings- Vascular status intact left, has pain on palpation of the left plantar heel, no erythema, no drainage, no odor, no calor, no gross tendon voids.    Assessment and plan- Plantar Fasciitis left.  Diagnosis and treatment options discussed with the patient.  She relates she is going to do physical therapy again but she is waiting to get a scheduled appointment.  Previous notes reviewed.  Return to clinic and see me as needed.          Straightforward medical decision making.      Again, thank you for allowing me to participate in the care of your patient.        Sincerely,        Des Mckeon DPM

## 2024-02-07 ENCOUNTER — THERAPY VISIT (OUTPATIENT)
Dept: PHYSICAL THERAPY | Facility: CLINIC | Age: 44
End: 2024-02-07
Payer: COMMERCIAL

## 2024-02-07 DIAGNOSIS — M25.572 PAIN IN JOINT INVOLVING ANKLE AND FOOT, LEFT: Primary | ICD-10-CM

## 2024-02-07 PROCEDURE — 97140 MANUAL THERAPY 1/> REGIONS: CPT | Mod: GP | Performed by: PHYSICAL THERAPY ASSISTANT

## 2024-02-07 PROCEDURE — 97033 APP MDLTY 1+IONTPHRSIS EA 15: CPT | Mod: GP | Performed by: PHYSICAL THERAPY ASSISTANT

## 2024-02-19 ENCOUNTER — THERAPY VISIT (OUTPATIENT)
Dept: PHYSICAL THERAPY | Facility: CLINIC | Age: 44
End: 2024-02-19
Payer: COMMERCIAL

## 2024-02-19 DIAGNOSIS — M25.571 PAIN IN JOINT INVOLVING ANKLE AND FOOT, RIGHT: ICD-10-CM

## 2024-02-19 DIAGNOSIS — M25.572 PAIN IN JOINT INVOLVING ANKLE AND FOOT, LEFT: Primary | ICD-10-CM

## 2024-02-19 PROCEDURE — 97140 MANUAL THERAPY 1/> REGIONS: CPT | Mod: GP | Performed by: PHYSICAL THERAPIST

## 2024-02-19 PROCEDURE — 97110 THERAPEUTIC EXERCISES: CPT | Mod: GP | Performed by: PHYSICAL THERAPIST

## 2024-02-19 PROCEDURE — 97161 PT EVAL LOW COMPLEX 20 MIN: CPT | Mod: GP | Performed by: PHYSICAL THERAPIST

## 2024-02-19 NOTE — PROGRESS NOTES
PHYSICAL THERAPY EVALUATION  Type of Visit: Evaluation    See electronic medical record for Abuse and Falls Screening details.    Subjective       Presenting condition or subjective complaint: Bilateral foot pain in the plantar fascia area and ankle. She has been seen for the left foot here for iontophoresis and other physical therapy interventions with worsening of her right foot symptoms now. She has new orders from Dr. Mckeon for treatment of both feet.  Date of onset: 01/18/24 (chronic; date of new orders)    Relevant medical history: -- (see prior PT evaluation)   Dates & types of surgery:  no surgeries in involved area but received several injections (most recently 1/19/24)    Prior diagnostic imaging/testing results:     Right foot MRI: Acute on chronic plantar fasciitis, more marked along the medial aspect. There is plantar calcaneal spurring, reactive enthesitis and reactive edema within the fat pad of the heel. No tearing. No imaging for left.  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    Patient goals for therapy: Take care of household and daily duties without pain; work better    Pain assessment: See objective evaluation for additional pain details     Objective   FOOT/ANKLE EVALUATION  PAIN: Pain Level at Best: 5/10  Pain Level at Worst: 9/10  Pain Location: The left foot burns into plantar fascia and heel area as well as some lateral ankle, and the right foot is a bit more localized into the plantar fascia  Pain Quality: Burning  Pain Frequency: constant  Pain is Worst: daytime  Pain is Exacerbated By: does not notice as much when she is standing up while at work but really notices it once she sits down or lays down after standing for a long time; walking initially first in the AM  Pain is Relieved By: iontophoresis helpful  Pain Progression: Worsened    INTEGUMENTARY (edema, incisions):  mild edema  around medial and lateral malleoli L sided; none on right  POSTURE: Standing Posture: Pes planus  GAIT:   Weightbearing Status: WBAT  Assistive Device(s): None  Gait Deviations: Antalgic  Increased toe out angle  BALANCE/PROPRIOCEPTION:   WEIGHT BEARING ALIGNMENT:   NON-WEIGHTBEARING ALIGNMENT:    ROM:   (Degrees) Left AROM Left PROM  Right AROM Right PROM   Ankle Dorsiflexion 8  10    Ankle Plantarflexion 58  60    Ankle Inversion 43  44    Ankle Eversion 16  20    Great Toe Flexion       Great Toe Extension       Pain:   End feel:     STRENGTH:   Pain: - none + mild ++ moderate +++ severe  Strength Scale: 0-5/5 Left Right   Ankle Dorsiflexion 4 4+   Ankle Plantarflexion 4, + (mild) 5-   Ankle Inversion 4 4   Ankle Eversion 3+ 4   Great Toe Flexion     Great Toe Extension     Anterior Tibialis     Posterior Tibialis     Peroneals     Extensor Digitorum     Gastroc/Soleus       FLEXIBILITY: gastroc/soleus primarily (L>R)  SPECIAL TESTS:   FUNCTIONAL TESTS:   PALPATION: bilateal pain with pressure to medial calcaneal tuberosity and plantar fascia broadly; L fibularis tendons painful, Achilles sore; posterior tibialis L painful  JOINT MOBILITY: R WNL; L talocrural hypomobile all directions, STJ mildly hypomobile, MTJ WNL    Assessment & Plan   CLINICAL IMPRESSIONS  Medical Diagnosis: Plantar fasciitis, bilateral    Treatment Diagnosis: Plantar fasciitis, bilateral; posterior tibialis tendinosis, left; fibularis tendinosis   Impression/Assessment: Patient is a 44 year old female with bilateral foot complaints.  The following significant findings have been identified: Pain, Decreased ROM/flexibility, Decreased joint mobility, Decreased strength, Impaired muscle performance, and Decreased activity tolerance. These impairments interfere with their ability to perform self care tasks, work tasks, recreational activities, household chores, driving , household mobility, and community mobility as compared to previous level of  function.     Clinical Decision Making (Complexity):  Clinical Presentation: Stable/Uncomplicated  Clinical Presentation Rationale: based on medical and personal factors listed in PT evaluation  Clinical Decision Making (Complexity): Low complexity    PLAN OF CARE  Treatment Interventions:  Modalities: Iontophoresis, Ultrasound  Interventions: Manual Therapy, Neuromuscular Re-education, Therapeutic Activity, Therapeutic Exercise, Self-Care/Home Management    Long Term Goals     PT Goal 1  Goal Identifier: Walking goal  Goal Description: Patient will be able to walk initially in the AM without pain and have <3/10 pain in foot throughout the day at work  Rationale: to maximize safety and independence with performance of ADLs and functional tasks;to maximize safety and independence within the home;to maximize safety and independence with self cares  Target Date: 05/13/24      Frequency of Treatment: 1x/week  Duration of Treatment: 12 weeks    Recommended Referrals to Other Professionals:  none  Education Assessment:   Learner/Method: No Barriers to Learning;Pictures/Video;Demonstration;Reading;Listening;Patient    Risks and benefits of evaluation/treatment have been explained.   Patient/Family/caregiver agrees with Plan of Care.     Evaluation Time:     PT Eval, Low Complexity Minutes (12635): 15     Signing Clinician: Michael Sparks, PT      [facility]                                                                                   OUTPATIENT PHYSICAL THERAPY      PLAN OF TREATMENT FOR OUTPATIENT REHABILITATION   Patient's Last Name, First Name, Tanya Callaway YOB: 1980   Provider's Name   [facility]   Medical Record No.  [id number]     Onset Date: 01/18/24 (chronic; date of new orders)  Start of Care Date: 02/19/24     Medical Diagnosis:  Plantar fasciitis, bilateral      PT Treatment Diagnosis:  Plantar fasciitis,  bilateral; posterior tibialis tendinosis, left; fibularis tendinosis Plan of Treatment  Frequency/Duration: 1x/week/ 12 weeks    Certification date from 02/19/24 to 05/13/24         See note for plan of treatment details and functional goals     Michael Sparks, PT                         I CERTIFY THE NEED FOR THESE SERVICES FURNISHED UNDER        THIS PLAN OF TREATMENT AND WHILE UNDER MY CARE     (Physician attestation of this document indicates review and certification of the therapy plan).              Referring Provider:  Des Mckeon    Initial Assessment  See Epic Evaluation- Start of Care Date: 02/19/24

## 2024-02-21 ENCOUNTER — MYC MEDICAL ADVICE (OUTPATIENT)
Dept: FAMILY MEDICINE | Facility: CLINIC | Age: 44
End: 2024-02-21

## 2024-02-21 ENCOUNTER — THERAPY VISIT (OUTPATIENT)
Dept: PHYSICAL THERAPY | Facility: CLINIC | Age: 44
End: 2024-02-21
Payer: COMMERCIAL

## 2024-02-21 DIAGNOSIS — M25.571 PAIN IN JOINT INVOLVING ANKLE AND FOOT, RIGHT: Primary | ICD-10-CM

## 2024-02-21 PROCEDURE — 97033 APP MDLTY 1+IONTPHRSIS EA 15: CPT | Mod: GP | Performed by: PHYSICAL THERAPY ASSISTANT

## 2024-02-21 NOTE — TELEPHONE ENCOUNTER
Patient scheduled for physical with Dr. Floyd on 2/27. She recently had physical on 1/9/24. Patient will need to cancel physical or change appointment type if she has other concerns she would like to discuss with provider. Sent patient [employer] message.

## 2024-02-28 ENCOUNTER — THERAPY VISIT (OUTPATIENT)
Dept: PHYSICAL THERAPY | Facility: CLINIC | Age: 44
End: 2024-02-28
Payer: COMMERCIAL

## 2024-02-28 DIAGNOSIS — M25.571 PAIN IN JOINT INVOLVING ANKLE AND FOOT, RIGHT: Primary | ICD-10-CM

## 2024-02-28 PROCEDURE — 97110 THERAPEUTIC EXERCISES: CPT | Mod: GP | Performed by: PHYSICAL THERAPY ASSISTANT

## 2024-02-28 PROCEDURE — 97033 APP MDLTY 1+IONTPHRSIS EA 15: CPT | Mod: GP | Performed by: PHYSICAL THERAPY ASSISTANT

## 2024-02-28 PROCEDURE — 97140 MANUAL THERAPY 1/> REGIONS: CPT | Mod: GP | Performed by: PHYSICAL THERAPY ASSISTANT

## 2024-04-01 NOTE — PROGRESS NOTES
Rheumatology Clinic Visit  [facility]  GloriaLENNY Kuo     Tanya Denny MRN# [id number]   YOB: 1980 Age: 44 year old   Date of Visit: 4/3/2024  Primary care provider: Mele Floyd          Assessment and Plan:     1.  Polyarthralgia  2.  Elevated sed rate  3.  Left elbow swelling    Patient presents today for an initial evaluation.  She is present with her daughter.  She states that she started to notice joint pain particularly in her feet in 2019.  She then got 3 of the COVID vaccines and had a baby in 2021 and it seemed as if her pain was worse after that.  The majority of her pain is in her heels the left is worse than the right.  She has been working with podiatry and has had injections in the left heel without any benefit.  She also has noticed swelling over the antecubital fossa on the left.  There is tenderness associated with that as well.  She does state that mornings tend to be worse for her when she first gets up in the morning.  She is not sure how long it takes for her to loosen up but she has a children and just needs to get up and start moving.  Physical examination today shows tenderness over her bilateral heels, particularly on the bottom of her feet.  She also has swelling on the medial aspect of her left antecubital fossa.  There is almost a masslike structure in that area but difficult to discern margins.  We did review the results of her left ankle MRI which did show some reactive enthesitis which could be related to her plantar fasciitis but it is also concerning that there may be an inflammatory condition going on as well.  At this time we will get an x-ray of her left elbow.  I did discuss with the patient that we may need to consider an MRI.  We will also recheck her inflammatory markers as well as the antibodies associated with rheumatoid arthritis.  I also like to check an HLA-B27.  Depending on the results of the evaluation we may start  immunomodulatory medication if appropriate.  I will contact the patient with the results once they are complete.    Reassured the patient that there is no evidence suggestive of a connective tissue disorder.  Her symptoms are not suggestive of it and her SASHA has been negative in the past.  Discussed that I do have some suspicion for an inflammatory arthritis such as a spondyloarthropathy.    Plan:     Schedule follow-up with Gloria Gan PA-C as needed  Imaging: xray left elbow-may need to get a MRI of the left elbow  Labs: CRP, Sed Rate, CCP antibody, Rheumatoid factor, HLA-B27    Gloria Gan, PAC  Rheumatology         History of Present Illness:   Tanya Denny presents for evaluation of joint pain.      She reports that she has had joint pain since 2019. She got the COVID vaccines and had a baby in 2021. The pain was worse after that. She has pain in her left heel. After her baby in 2021, she had swelling in her feet. She went to the Er and was told that she had an infection. The swelling resolved with the antibiotics, but the pain persists. She has had injections. They have not been helpful. She feels the pain was worse after the injections. She does have pain in her right heel as well, but she has not had any injections in the right heel. She has taken Meloxicam, but it did not help. She felt lightheaded and dizzy with it. No skin rashes or mouth sores. No unexplained fevers. Since 2019 she feels that her body has slowed down. She states that she has been unable to do exercise. She is unable to run or jump due to her feet. She does have occasional knee pain, usually if it is too cold outside. Only back pain during pregnancy. She does get pain in her left elbow. Occasionally she will have stiffness in the left hand. No history of uveitis or iritis.    Dad has knee problems. No known history of psoriasis.          Review of Systems:     Constitutional: negative  Skin: negative  Eyes:  negative  Ears/Nose/Throat: negative  Respiratory: No shortness of breath, dyspnea on exertion, cough, or hemoptysis  Cardiovascular: negative  Gastrointestinal: negative  Genitourinary: negative  Musculoskeletal: as above  Neurologic: negative  Psychiatric: negative  Hematologic/Lymphatic/Immunologic: negative  Endocrine: negative         Active Problem List:     Patient Active Problem List    Diagnosis Date Noted    Pain in joint involving ankle and foot, right 2024     Priority: Medium    Pain in joint involving ankle and foot, left 2023     Priority: Medium    Morbid obesity (H) 07/10/2022     Priority: Medium    Endometritis 2021     Priority: Medium    Somatic dysfunction of lumbar region 2020     Priority: Medium    AMA (advanced maternal age) multigravida 35+ 2019     Priority: Medium    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 2019     Priority: Medium    GERD (gastroesophageal reflux disease) 2018     Priority: Medium     Formatting of this note might be different from the original.  Zantac      Uterine leiomyoma, unspecified location 2016     Priority: Medium    Chronic mixed headache syndrome 05/10/2016     Priority: Medium    Dyspepsia 10/29/2012     Priority: Medium    Lumbago 10/29/2012     Priority: Medium            Past Medical History:     Past Medical History:   Diagnosis Date    Migraine     SAB (spontaneous )     1ST TRIMESTER     Past Surgical History:   Procedure Laterality Date    D & C              Social History:     Social History     Socioeconomic History    Marital status:      Spouse name: Not on file    Number of children: Not on file    Years of education: Not on file    Highest education level: Not on file   Occupational History    Not on file   Tobacco Use    Smoking status: Never    Smokeless tobacco: Never   Vaping Use    Vaping Use: Never used   Substance and Sexual Activity    Alcohol use: No    Drug use: No    Sexual activity: Yes      Partners: Male     Birth control/protection: None   Other Topics Concern    Parent/sibling w/ CABG, MI or angioplasty before 65F 55M? No     Service No    Blood Transfusions No    Caffeine Concern No    Occupational Exposure No    Hobby Hazards No    Sleep Concern No    Stress Concern No    Weight Concern No    Special Diet Yes    Back Care No    Exercise Yes    Bike Helmet No    Seat Belt Yes    Self-Exams Yes   Social History Narrative    Mother of 4, all starting school this yearWorks in  can bring her kids there too.  is a teacher , out of work now.         How much exercise per week? 3 days     How much calcium per day? 1 serving      How much caffeine per day? 3 cups    How much vitamin D per day?  prenatals    Do you/your family wear seatbelts?  Yes    Do you/your family use safety helmets? na    Do you/your family use sunscreen?No    Do you/your family keep firearms in the home? No    Do you/your family have a smoke detector(s)? Yes        Do you feel safe in your home? Yes    Has anyone ever touched you in an unwanted manner?      Explain         June 11, 2014 Shaneka Abbasi LPN        [facility]         Social Determinants of Health     Financial Resource Strain: Low Risk  (1/9/2024)    Financial Resource Strain     Within the past 12 months, have you or your family members you live with been unable to get utilities (heat, electricity) when it was really needed?: No   Food Insecurity: Low Risk  (1/9/2024)    Food Insecurity     Within the past 12 months, did you worry that your food would run out before you got money to buy more?: No     Within the past 12 months, did the food you bought just not last and you didn t have money to get more?: No   Transportation Needs: Low Risk  (1/9/2024)    Transportation Needs     Within the past 12 months, has lack of transportation kept you from medical appointments, getting your medicines, non-medical meetings or appointments,  work, or from getting things that you need?: No   Physical Activity: Not on file   Stress: Not on file   Social Connections: Not on file   Interpersonal Safety: Low Risk  (12/12/2023)    Interpersonal Safety     Do you feel physically and emotionally safe where you currently live?: Yes     Within the past 12 months, have you been hit, slapped, kicked or otherwise physically hurt by someone?: No     Within the past 12 months, have you been humiliated or emotionally abused in other ways by your partner or ex-partner?: No   Housing Stability: Low Risk  (1/9/2024)    Housing Stability     Do you have housing? : Yes     Are you worried about losing your housing?: No   Recent Concern: Housing Stability - High Risk (12/12/2023)    Housing Stability     Do you have housing? : No     Are you worried about losing your housing?: No          Family History:     Family History   Problem Relation Age of Onset    Allergies Mother         angioedema in family members unspecified    Diabetes Mother     Hypertension Mother     Diabetes Father     Hypertension Father     Diabetes Maternal Grandmother     Hypertension Maternal Grandmother     Asthma Maternal Grandmother     Diabetes Maternal Grandfather     Hypertension Maternal Grandfather     Asthma Maternal Grandfather     Cancer No family hx of     Cerebrovascular Disease No family hx of     Thyroid Disease No family hx of     Glaucoma No family hx of     Macular Degeneration No family hx of     Breast Cancer No family hx of     Colon Cancer No family hx of     Depression No family hx of     Anxiety Disorder No family hx of     Genetic Disorder No family hx of             Allergies:     Allergies   Allergen Reactions    Pork Gelatin [Pork (Porcine) Protein] GI Disturbance            Medications:     Current Outpatient Medications   Medication Sig Dispense Refill    Ascorbic Acid (VITAMIN C PO) Take by mouth daily      Cholecalciferol (VITAMIN D PO) Take 1,000 Units by mouth daily    "   ferrous sulfate (FEROSUL) 325 (65 Fe) MG tablet TAKE 1 TABLET BY MOUTH EVERY DAY WITH BREAKFAST      meloxicam (MOBIC) 15 MG tablet Take 1 tablet (15 mg) by mouth daily (Patient taking differently: Take 15 mg by mouth daily as needed) 90 tablet 0    Multiple Vitamins-Minerals (ZINC PO) Take by mouth daily              Physical Exam:   Blood pressure 119/84, pulse 67, resp. rate 12, height 1.626 m (5' 4\"), weight 93.3 kg (205 lb 9.6 oz), SpO2 99%, not currently breastfeeding.  Wt Readings from Last 6 Encounters:   01/09/24 96.2 kg (212 lb)   12/12/23 96.2 kg (212 lb)   07/16/23 95.3 kg (210 lb 1.6 oz)   05/21/23 96.3 kg (212 lb 6.4 oz)   03/21/23 97.8 kg (215 lb 8 oz)   01/06/23 98.3 kg (216 lb 12.8 oz)     Constitutional: well-developed, appearing stated age; cooperative  Eyes: nl PERRLA, conjunctiva, sclera  ENT: nl external ears, nose, hearing, lips, teeth, gums, throat. No mucositis.   No mucous membrane lesions, normal saliva pool  Neck: no mass or thyroid enlargement  Resp: No shortness of breath with normal conversation  Lymph: no cervical, supraclavicular or epitrochlear nodes  MS: No active synovitis or dactylitis.  Tenderness over her bilateral heels particularly on the bottom of her foot.  She does have swelling over the medial aspect of the antecubital fossa on the left.  No spinal tenderness to palpation.  Skin: no nail pitting, alopecia, rash, nodules or lesions.   Psych: nl judgement, orientation, memory, affect.           Data:   Imaging:  MRI lumbar spine 07/16/2021  IMPRESSION:  Minimal disc bulge at L5-S1. There is a very small central/right paracentral disc protrusion with annular tear that abuts the traversing right S1 nerve root. No central spinal canal stenosis.     MRI left ankle 10/11/2023  IMPRESSION:  1.  Acute on chronic plantar fasciitis, more marked along the medial aspect. There is plantar calcaneal spurring, reactive enthesitis and reactive edema within the fat pad of the heel. No " tearing.  2.  Low-grade peroneus longus tendinopathy as it curves underneath the foot. No tearing.  3.  No additional tendinous or ligamentous pathology.  4.  No evidence for fracture or bone marrow signal abnormality to suggest fracture or osteochondral lesion.    Laboratory:  7/21/2020  Rheumatoid factor 8  SASHA negative    08/12/2020  CCP antibody 1   sed rate 60  Lupus anticoagulant negative  SCL 70 negative  SSA and SSB negative    1/9/2024  Creatinine 0.72, GFR greater than 90  White blood cell count 8.0, hemoglobin 11.4, platelet count 235  Sed rate 64

## 2024-04-03 ENCOUNTER — OFFICE VISIT (OUTPATIENT)
Dept: RHEUMATOLOGY | Facility: CLINIC | Age: 44
End: 2024-04-03
Attending: FAMILY MEDICINE
Payer: COMMERCIAL

## 2024-04-03 ENCOUNTER — ANCILLARY PROCEDURE (OUTPATIENT)
Dept: GENERAL RADIOLOGY | Facility: CLINIC | Age: 44
End: 2024-04-03
Attending: PHYSICIAN ASSISTANT
Payer: COMMERCIAL

## 2024-04-03 VITALS
WEIGHT: 205.6 LBS | HEIGHT: 64 IN | HEART RATE: 67 BPM | RESPIRATION RATE: 12 BRPM | SYSTOLIC BLOOD PRESSURE: 119 MMHG | DIASTOLIC BLOOD PRESSURE: 84 MMHG | OXYGEN SATURATION: 99 % | BODY MASS INDEX: 35.1 KG/M2

## 2024-04-03 DIAGNOSIS — M25.422 ELBOW SWELLING, LEFT: ICD-10-CM

## 2024-04-03 DIAGNOSIS — M25.50 POLYARTHRALGIA: ICD-10-CM

## 2024-04-03 DIAGNOSIS — R70.0 ELEVATED ERYTHROCYTE SEDIMENTATION RATE: ICD-10-CM

## 2024-04-03 DIAGNOSIS — M25.50 POLYARTHRALGIA: Primary | ICD-10-CM

## 2024-04-03 LAB
CRP SERPL-MCNC: <3 MG/L
ERYTHROCYTE [SEDIMENTATION RATE] IN BLOOD BY WESTERGREN METHOD: 36 MM/HR (ref 0–20)
RHEUMATOID FACT SERPL-ACNC: <10 IU/ML

## 2024-04-03 PROCEDURE — 86431 RHEUMATOID FACTOR QUANT: CPT | Performed by: PHYSICIAN ASSISTANT

## 2024-04-03 PROCEDURE — 73080 X-RAY EXAM OF ELBOW: CPT | Mod: TC | Performed by: RADIOLOGY

## 2024-04-03 PROCEDURE — 86200 CCP ANTIBODY: CPT | Performed by: PHYSICIAN ASSISTANT

## 2024-04-03 PROCEDURE — 99204 OFFICE O/P NEW MOD 45 MIN: CPT | Performed by: PHYSICIAN ASSISTANT

## 2024-04-03 PROCEDURE — 86140 C-REACTIVE PROTEIN: CPT | Performed by: PHYSICIAN ASSISTANT

## 2024-04-03 PROCEDURE — 81374 HLA I TYPING 1 ANTIGEN LR: CPT | Performed by: PHYSICIAN ASSISTANT

## 2024-04-03 PROCEDURE — 36415 COLL VENOUS BLD VENIPUNCTURE: CPT | Performed by: PHYSICIAN ASSISTANT

## 2024-04-03 PROCEDURE — 85652 RBC SED RATE AUTOMATED: CPT | Performed by: PHYSICIAN ASSISTANT

## 2024-04-03 ASSESSMENT — PAIN SCALES - GENERAL: PAINLEVEL: WORST PAIN (10)

## 2024-04-03 NOTE — PATIENT INSTRUCTIONS
After Visit Instructions:     Thank you for coming to [facility] for your care. It is my goal to partner with you to help you reach your optimal state of health.       Plan:     Schedule follow-up with Gloria Gan PA-C as needed  Imaging: xray left elbow-may need to get a MRI of the left elbow  Labs: CRP, Sed Rate, CCP antibody, Rheumatoid factor, HLA-B27        Gloria Gan PA-C  [facility]  [facility]    Contact information: [facility]  Clinic Number:  [phone]  Please call or send a Mevio message with any questions about your care

## 2024-04-05 LAB
B LOCUS: NORMAL
B27TEST METHOD: NORMAL
CCP AB SER IA-ACNC: 2 U/ML

## 2024-04-09 ENCOUNTER — TRANSFERRED RECORDS (OUTPATIENT)
Dept: MULTI SPECIALTY CLINIC | Facility: CLINIC | Age: 44
End: 2024-04-09

## 2024-04-09 LAB — RETINOPATHY: NORMAL

## 2024-04-24 ENCOUNTER — MYC MEDICAL ADVICE (OUTPATIENT)
Dept: PODIATRY | Facility: CLINIC | Age: 44
End: 2024-04-24
Payer: COMMERCIAL

## 2024-05-07 ENCOUNTER — ANCILLARY PROCEDURE (OUTPATIENT)
Dept: GENERAL RADIOLOGY | Facility: CLINIC | Age: 44
End: 2024-05-07
Attending: PODIATRIST
Payer: COMMERCIAL

## 2024-05-07 ENCOUNTER — OFFICE VISIT (OUTPATIENT)
Dept: PODIATRY | Facility: CLINIC | Age: 44
End: 2024-05-07
Payer: COMMERCIAL

## 2024-05-07 DIAGNOSIS — M72.2 PLANTAR FASCIITIS OF RIGHT FOOT: Primary | ICD-10-CM

## 2024-05-07 DIAGNOSIS — M67.88 PERONEAL TENDINOSIS, RIGHT: ICD-10-CM

## 2024-05-07 PROCEDURE — 73630 X-RAY EXAM OF FOOT: CPT | Mod: RT | Performed by: RADIOLOGY

## 2024-05-07 PROCEDURE — 99214 OFFICE O/P EST MOD 30 MIN: CPT | Mod: 25 | Performed by: PODIATRIST

## 2024-05-07 PROCEDURE — 20550 NJX 1 TENDON SHEATH/LIGAMENT: CPT | Performed by: PODIATRIST

## 2024-05-07 RX ORDER — DEXAMETHASONE SODIUM PHOSPHATE 4 MG/ML
INJECTION, SOLUTION INTRA-ARTICULAR; INTRALESIONAL; INTRAMUSCULAR; INTRAVENOUS; SOFT TISSUE
Qty: 30 ML | Refills: 0 | Status: SHIPPED | OUTPATIENT
Start: 2024-05-07 | End: 2024-10-04

## 2024-05-07 RX ORDER — DEXAMETHASONE SODIUM PHOSPHATE 4 MG/ML
4 INJECTION, SOLUTION INTRA-ARTICULAR; INTRALESIONAL; INTRAMUSCULAR; INTRAVENOUS; SOFT TISSUE ONCE
Status: COMPLETED | OUTPATIENT
Start: 2024-05-07 | End: 2024-05-07

## 2024-05-07 RX ADMIN — DEXAMETHASONE SODIUM PHOSPHATE 4 MG: 4 INJECTION, SOLUTION INTRA-ARTICULAR; INTRALESIONAL; INTRAMUSCULAR; INTRAVENOUS; SOFT TISSUE at 15:09

## 2024-05-07 NOTE — NURSING NOTE
[facility]   ORTHOPEDICS & SPORTS MEDICINE  [address] [place] [address]  Dept: [phone]  ______________________________________________________________________________    Patient: Tanya Denny   : 1980   MRN: [id number]   May 7, 2024    INVASIVE PROCEDURE SAFETY CHECKLIST    Date: 24   Procedure:Right foot injection  Patient Name: Tanya Denny  MRN: [id number]  YOB: 1980    Action: Complete sections as appropriate. Any discrepancy results in a HARD COPY until resolved.     PRE PROCEDURE:  Patient ID verified with 2 identifiers (name and  or MRN): Yes  Procedure and site verified with patient/designee (when able): Yes  Accurate consent documentation in medical record: Yes  H&P (or appropriate assessment) documented in medical record: NA  H&P must be up to 20 days prior to procedure and updates within 24 hours of procedure as applicable: NA  Relevant diagnostic and radiology test results appropriately labeled and displayed as applicable: Yes  Procedure site(s) marked with provider initials: Yes    TIMEOUT:  Time-Out performed immediately prior to starting procedure, including verbal and active participation of all team members addressing the following:Yes  * Correct patient identify  * Confirmed that the correct side and site are marked  * An accurate procedure consent form  * Agreement on the procedure to be done  * Correct patient position  * Relevant images and results are properly labeled and appropriately displayed  * The need to administer antibiotics or fluids for irrigation purposes during the procedure as applicable   * Safety precautions based on patient history or medication use    DURING PROCEDURE: Verification of correct person, site, and procedures any time the responsibility for care of the patient is transferred to another member of the care team.       Prior to injection, verified patient identity using patient's name and date  of birth.  Due to injection administration, patient instructed to remain in clinic for 15 minutes  afterwards, and to report any adverse reaction to me immediately.    Right foot injection    Drug Amount Wasted:  None.  Vial/Syringe: Single dose vial  Expiration Date:  9/30/25      Fadumo Walton LPN  May 7, 2024

## 2024-05-07 NOTE — LETTER
2024         RE: Tanya Denny  [address]  [place] [address]        Dear Colleague,    Thank you for referring your patient, Tanya Denny, to the [facility]. Please see a copy of my visit note below.    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     Somatic dysfunction of lumbar region     Endometritis     GERD (gastroesophageal reflux disease)     Morbid obesity (H)     Pain in joint involving ankle and foot, left     Pain in joint involving ankle and foot, right     Past Surgical History:   Procedure Laterality Date     D & C       Social History     Socioeconomic History     Marital status:      Spouse name: Not on file     Number of children: Not on file     Years of education: Not on file     Highest education level: Not on file   Occupational History     Not on file   Tobacco Use     Smoking status: Never     Smokeless tobacco: Never   Vaping Use     Vaping status: Never Used   Substance and Sexual Activity     Alcohol use: No     Drug use: No     Sexual activity: Yes     Partners: Male     Birth control/protection: None   Other Topics Concern     Parent/sibling w/ CABG, MI or angioplasty before 65F 55M? No      Service No     Blood Transfusions No     Caffeine Concern No     Occupational Exposure No     Hobby Hazards No     Sleep Concern No     Stress Concern No     Weight Concern No     Special Diet Yes     Back Care No     Exercise Yes     Bike Helmet No     Seat Belt Yes     Self-Exams Yes   Social History Narrative    Mother of 4, all starting school this yearWorks in  can bring her kids there too.  is a teacher , out of work  now.         How much exercise per week? 3 days     How much calcium per day? 1 serving      How much caffeine per day? 3 cups    How much vitamin D per day?  prenatals    Do you/your family wear seatbelts?  Yes    Do you/your family use safety helmets? na    Do you/your family use sunscreen?No    Do you/your family keep firearms in the home? No    Do you/your family have a smoke detector(s)? Yes        Do you feel safe in your home? Yes    Has anyone ever touched you in an unwanted manner?      Explain         June 11, 2014 Shaneka Abbasi LPN        [facility]         Social Determinants of Health     Financial Resource Strain: Low Risk  (1/9/2024)    Financial Resource Strain      Within the past 12 months, have you or your family members you live with been unable to get utilities (heat, electricity) when it was really needed?: No   Food Insecurity: Low Risk  (1/9/2024)    Food Insecurity      Within the past 12 months, did you worry that your food would run out before you got money to buy more?: No      Within the past 12 months, did the food you bought just not last and you didn t have money to get more?: No   Transportation Needs: Low Risk  (1/9/2024)    Transportation Needs      Within the past 12 months, has lack of transportation kept you from medical appointments, getting your medicines, non-medical meetings or appointments, work, or from getting things that you need?: No   Physical Activity: Not on file   Stress: Not on file   Social Connections: Not on file   Interpersonal Safety: Low Risk  (12/12/2023)    Interpersonal Safety      Do you feel physically and emotionally safe where you currently live?: Yes      Within the past 12 months, have you been hit, slapped, kicked or otherwise physically hurt by someone?: No      Within the past 12 months, have you been humiliated or emotionally abused in other ways by your partner or ex-partner?: No   Housing Stability: Low Risk  (1/9/2024)     Housing Stability      Do you have housing? : Yes      Are you worried about losing your housing?: No   Recent Concern: Housing Stability - High Risk (12/12/2023)    Housing Stability      Do you have housing? : No      Are you worried about losing your housing?: No     Family History   Problem Relation Age of Onset     Allergies Mother         angioedema in family members unspecified     Diabetes Mother      Hypertension Mother      Diabetes Father      Hypertension Father      Diabetes Maternal Grandmother      Hypertension Maternal Grandmother      Asthma Maternal Grandmother      Diabetes Maternal Grandfather      Hypertension Maternal Grandfather      Asthma Maternal Grandfather      Cancer No family hx of      Cerebrovascular Disease No family hx of      Thyroid Disease No family hx of      Glaucoma No family hx of      Macular Degeneration No family hx of      Breast Cancer No family hx of      Colon Cancer No family hx of      Depression No family hx of      Anxiety Disorder No family hx of      Genetic Disorder No family hx of              Subjective findings- 44-year-old returns clinic for right heel lateral ankle pain.  Relates this started about 1 week after we seen her last, relates she was doing physical therapy but is not doing that now, relates she is on her feet at work, relates to no specific injury, relates left heel is better.    Objective findings- DP and PT are 2 out of 4 bilaterally.  Left foot with no erythema, no drainage, no odor, no calor, no tendon voids, no pain on palpation, no pain or range of motion.  Right plantar heel pain on palpation.  Right peroneal tendon course pain on palpation, edema in the peroneal tendon course.  Right foot with no erythema, no drainage, no odor, no calor, no tendon voids.  X-rays 3 views right foot reviewed with patient in clinic today joint and cortical margins are intact, dorsally contracted digits and plantar calcaneal spur noted.    Assessment and  plan- Plantar Fasciitis right, Peroneal tendinopathy right.  Diagnosis and treatment options discussed with the patient.  Prescription for physical therapy with iontophoresis given use discussed with the patient.  Prescription for Dexamethasone sodium phosphate given use discussed with the patient for iontophoresis.  Ankle brace dispensed and use discussed with the patient.  She request corticosteroid injection.  Procedure, potential risk, expected benefits, expected outcomes and follow-up discussed with the patient.  Consent signed.  The right foot was prepped and the right plantar heel was injected at trigger point with 1 cc of dexamethasone sodium phosphate 4 mg/mL and 2 cc of 1% lidocaine plain mix.  The patient tolerated the procedure well with no complications.  This is the first injection into the right heel.  X-rays right foot ordered, reviewed and use discussed with the patient.  Previous notes reviewed.  Return to clinic and see me in 2 weeks.                                Moderate level of medical decision making.      Again, thank you for allowing me to participate in the care of your patient.        Sincerely,        Des Mckeon DPM

## 2024-05-07 NOTE — PROGRESS NOTES
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    Somatic dysfunction of lumbar region    Endometritis    GERD (gastroesophageal reflux disease)    Morbid obesity (H)    Pain in joint involving ankle and foot, left    Pain in joint involving ankle and foot, right     Past Surgical History:   Procedure Laterality Date    D & C       Social History     Socioeconomic History    Marital status:      Spouse name: Not on file    Number of children: Not on file    Years of education: Not on file    Highest education level: Not on file   Occupational History    Not on file   Tobacco Use    Smoking status: Never    Smokeless tobacco: Never   Vaping Use    Vaping status: Never Used   Substance and Sexual Activity    Alcohol use: No    Drug use: No    Sexual activity: Yes     Partners: Male     Birth control/protection: None   Other Topics Concern    Parent/sibling w/ CABG, MI or angioplasty before 65F 55M? No     Service No    Blood Transfusions No    Caffeine Concern No    Occupational Exposure No    Hobby Hazards No    Sleep Concern No    Stress Concern No    Weight Concern No    Special Diet Yes    Back Care No    Exercise Yes    Bike Helmet No    Seat Belt Yes    Self-Exams Yes   Social History Narrative    Mother of 4, all starting school this yearWorks in  can bring her kids there too.  is a teacher , out of work now.         How much exercise per week? 3 days     How much calcium per day? 1 serving      How much caffeine per day? 3 cups    How much vitamin D per day?  prenatals    Do you/your family wear seatbelts?  Yes    Do you/your family use safety helmets? na    Do you/your family use sunscreen?No    Do you/your  family keep firearms in the home? No    Do you/your family have a smoke detector(s)? Yes        Do you feel safe in your home? Yes    Has anyone ever touched you in an unwanted manner?      Explain         June 11, 2014 Shaneka Abbasi LPN        Reviewed mikal espinoza 12-         Social Determinants of Health     Financial Resource Strain: Low Risk  (1/9/2024)    Financial Resource Strain     Within the past 12 months, have you or your family members you live with been unable to get utilities (heat, electricity) when it was really needed?: No   Food Insecurity: Low Risk  (1/9/2024)    Food Insecurity     Within the past 12 months, did you worry that your food would run out before you got money to buy more?: No     Within the past 12 months, did the food you bought just not last and you didn t have money to get more?: No   Transportation Needs: Low Risk  (1/9/2024)    Transportation Needs     Within the past 12 months, has lack of transportation kept you from medical appointments, getting your medicines, non-medical meetings or appointments, work, or from getting things that you need?: No   Physical Activity: Not on file   Stress: Not on file   Social Connections: Not on file   Interpersonal Safety: Low Risk  (12/12/2023)    Interpersonal Safety     Do you feel physically and emotionally safe where you currently live?: Yes     Within the past 12 months, have you been hit, slapped, kicked or otherwise physically hurt by someone?: No     Within the past 12 months, have you been humiliated or emotionally abused in other ways by your partner or ex-partner?: No   Housing Stability: Low Risk  (1/9/2024)    Housing Stability     Do you have housing? : Yes     Are you worried about losing your housing?: No   Recent Concern: Housing Stability - High Risk (12/12/2023)    Housing Stability     Do you have housing? : No     Are you worried about losing your housing?: No     Family History   Problem Relation Age of Onset     Allergies Mother         angioedema in family members unspecified    Diabetes Mother     Hypertension Mother     Diabetes Father     Hypertension Father     Diabetes Maternal Grandmother     Hypertension Maternal Grandmother     Asthma Maternal Grandmother     Diabetes Maternal Grandfather     Hypertension Maternal Grandfather     Asthma Maternal Grandfather     Cancer No family hx of     Cerebrovascular Disease No family hx of     Thyroid Disease No family hx of     Glaucoma No family hx of     Macular Degeneration No family hx of     Breast Cancer No family hx of     Colon Cancer No family hx of     Depression No family hx of     Anxiety Disorder No family hx of     Genetic Disorder No family hx of              Subjective findings- 44-year-old returns clinic for right heel lateral ankle pain.  Relates this started about 1 week after we seen her last, relates she was doing physical therapy but is not doing that now, relates she is on her feet at work, relates to no specific injury, relates left heel is better.    Objective findings- DP and PT are 2 out of 4 bilaterally.  Left foot with no erythema, no drainage, no odor, no calor, no tendon voids, no pain on palpation, no pain or range of motion.  Right plantar heel pain on palpation.  Right peroneal tendon course pain on palpation, edema in the peroneal tendon course.  Right foot with no erythema, no drainage, no odor, no calor, no tendon voids.  X-rays 3 views right foot reviewed with patient in clinic today joint and cortical margins are intact, dorsally contracted digits and plantar calcaneal spur noted.    Assessment and plan- Plantar Fasciitis right, Peroneal tendinopathy right.  Diagnosis and treatment options discussed with the patient.  Prescription for physical therapy with iontophoresis given use discussed with the patient.  Prescription for Dexamethasone sodium phosphate given use discussed with the patient for iontophoresis.  Ankle brace dispensed and  use discussed with the patient.  She request corticosteroid injection.  Procedure, potential risk, expected benefits, expected outcomes and follow-up discussed with the patient.  Consent signed.  The right foot was prepped and the right plantar heel was injected at trigger point with 1 cc of dexamethasone sodium phosphate 4 mg/mL and 2 cc of 1% lidocaine plain mix.  The patient tolerated the procedure well with no complications.  This is the first injection into the right heel.  X-rays right foot ordered, reviewed and use discussed with the patient.  Previous notes reviewed.  Return to clinic and see me in 2 weeks.                                Moderate level of medical decision making.

## 2024-05-07 NOTE — NURSING NOTE
DME FITTING    Relevant Diagnosis: Plantar Fasciitis of the right foot  Trilock ankle brace was fit on patient's Right foot.     Person(s) involved in teaching:   Patient    Brace was applied in standard Manner:  Yes  Brace fit well:  Yes  Patient reports brace to fit comfortably:  Yes    Education:   Patient shown self application and removal of brace: Yes  Patient shown how to adjust brace fit, if necessary: Yes  Patient educated on billing and return policy: Yes  Patient confirmed understanding when and how to contact clinic with concerns: Yes

## 2024-05-07 NOTE — NURSING NOTE
Tanya Denny's chief complaint for this visit includes:  Chief Complaint   Patient presents with    Consult     Pain in the right heel,      PCP: Mele Floyd    Referring Provider:  No referring provider defined for this encounter.    There were no vitals taken for this visit.  Data Unavailable     Do you need any medication refills at today's visit? NO    Allergies   Allergen Reactions    Pork Gelatin [Pork (Porcine) Protein] GI Disturbance       Fadumo Walton LPN

## 2024-05-12 ENCOUNTER — OFFICE VISIT (OUTPATIENT)
Dept: URGENT CARE | Facility: URGENT CARE | Age: 44
End: 2024-05-12
Payer: COMMERCIAL

## 2024-05-12 VITALS
HEART RATE: 83 BPM | DIASTOLIC BLOOD PRESSURE: 79 MMHG | OXYGEN SATURATION: 98 % | SYSTOLIC BLOOD PRESSURE: 113 MMHG | TEMPERATURE: 98.4 F | RESPIRATION RATE: 16 BRPM | BODY MASS INDEX: 35.15 KG/M2 | WEIGHT: 204.8 LBS

## 2024-05-12 DIAGNOSIS — B37.31 YEAST INFECTION OF THE VAGINA: ICD-10-CM

## 2024-05-12 DIAGNOSIS — N76.0 BV (BACTERIAL VAGINOSIS): Primary | ICD-10-CM

## 2024-05-12 DIAGNOSIS — B96.89 BV (BACTERIAL VAGINOSIS): Primary | ICD-10-CM

## 2024-05-12 DIAGNOSIS — N89.8 VAGINAL DISCHARGE: ICD-10-CM

## 2024-05-12 LAB
ALBUMIN UR-MCNC: NEGATIVE MG/DL
APPEARANCE UR: CLEAR
BACTERIA #/AREA URNS HPF: ABNORMAL /HPF
BILIRUB UR QL STRIP: NEGATIVE
CLUE CELLS: PRESENT
COLOR UR AUTO: YELLOW
GLUCOSE UR STRIP-MCNC: NEGATIVE MG/DL
HGB UR QL STRIP: ABNORMAL
KETONES UR STRIP-MCNC: NEGATIVE MG/DL
LEUKOCYTE ESTERASE UR QL STRIP: NEGATIVE
NITRATE UR QL: NEGATIVE
PH UR STRIP: 6 [PH] (ref 5–7)
RBC #/AREA URNS AUTO: ABNORMAL /HPF
SP GR UR STRIP: 1.02 (ref 1–1.03)
SQUAMOUS #/AREA URNS AUTO: ABNORMAL /LPF
TRICHOMONAS, WET PREP: ABNORMAL
UROBILINOGEN UR STRIP-ACNC: 0.2 E.U./DL
WBC #/AREA URNS AUTO: ABNORMAL /HPF
WBC'S/HIGH POWER FIELD, WET PREP: ABNORMAL
YEAST, WET PREP: PRESENT

## 2024-05-12 PROCEDURE — 99213 OFFICE O/P EST LOW 20 MIN: CPT | Performed by: PHYSICIAN ASSISTANT

## 2024-05-12 PROCEDURE — 87210 SMEAR WET MOUNT SALINE/INK: CPT | Performed by: PHYSICIAN ASSISTANT

## 2024-05-12 PROCEDURE — 81001 URINALYSIS AUTO W/SCOPE: CPT | Performed by: PHYSICIAN ASSISTANT

## 2024-05-12 RX ORDER — METRONIDAZOLE 500 MG/1
500 TABLET ORAL 2 TIMES DAILY
Qty: 14 TABLET | Refills: 0 | Status: SHIPPED | OUTPATIENT
Start: 2024-05-12 | End: 2024-05-19

## 2024-05-12 RX ORDER — FLUCONAZOLE 150 MG/1
150 TABLET ORAL
Qty: 3 TABLET | Refills: 0 | Status: SHIPPED | OUTPATIENT
Start: 2024-05-12 | End: 2024-05-19

## 2024-05-12 ASSESSMENT — ENCOUNTER SYMPTOMS
MUSCULOSKELETAL NEGATIVE: 1
FEVER: 0
DYSURIA: 1
SORE THROAT: 0
DIZZINESS: 0
CARDIOVASCULAR NEGATIVE: 1
NECK PAIN: 0
HEMATURIA: 0
NECK STIFFNESS: 0
ADENOPATHY: 0
GASTROINTESTINAL NEGATIVE: 1
LIGHT-HEADEDNESS: 0
CHILLS: 0
COUGH: 0
NEUROLOGICAL NEGATIVE: 1
NAUSEA: 0
FLANK PAIN: 0
SHORTNESS OF BREATH: 0
ENDOCRINE NEGATIVE: 1
ACTIVITY CHANGE: 0
FREQUENCY: 0
POLYDIPSIA: 0
HEADACHES: 0
FATIGUE: 0
PALPITATIONS: 0
RESPIRATORY NEGATIVE: 1
WEAKNESS: 0
VOMITING: 0
MYALGIAS: 0
RHINORRHEA: 0
CONSTITUTIONAL NEGATIVE: 1
DIARRHEA: 0
ABDOMINAL PAIN: 0

## 2024-05-12 NOTE — PROGRESS NOTES
Chief Complaint:    Chief Complaint   Patient presents with    Urgent Care    Vaginal Problem     Feel like have yeast infection, Burning and itching since Thursday, buy otc and been treated it      ASSESSMENT  1. BV (bacterial vaginosis)    2. Yeast infection of the vagina    3. Vaginal discharge         PLAN    Urinalysis discussed with patient.  This was unremarkable for UTI.  Wet prep was positive for clue cells and yeast.  Rx for Flagyl, and Diflucan today  Follow up with PCP in 1 week if symptoms are not improving.  Worrisome symptoms discussed with instructions to go to the ED.  Patient verbalized understanding and agreed with this plan.    Labs:     Results for orders placed or performed in visit on 05/12/24   UA Macroscopic with reflex to Microscopic and Culture - Lab Collect     Status: Abnormal    Specimen: Urine, Clean Catch   Result Value Ref Range    Color Urine Yellow Colorless, Straw, Light Yellow, Yellow    Appearance Urine Clear Clear    Glucose Urine Negative Negative mg/dL    Bilirubin Urine Negative Negative    Ketones Urine Negative Negative mg/dL    Specific Gravity Urine 1.020 1.003 - 1.035    Blood Urine Small (A) Negative    pH Urine 6.0 5.0 - 7.0    Protein Albumin Urine Negative Negative mg/dL    Urobilinogen Urine 0.2 0.2, 1.0 E.U./dL    Nitrite Urine Negative Negative    Leukocyte Esterase Urine Negative Negative   UA Microscopic with Reflex to Culture     Status: Abnormal   Result Value Ref Range    Bacteria Urine Few (A) None Seen /HPF    RBC Urine 2-5 (A) 0-2 /HPF /HPF    WBC Urine None Seen 0-5 /HPF /HPF    Squamous Epithelials Urine Few (A) None Seen /LPF    Narrative    Urine Culture not indicated   Wet prep - lab collect     Status: Abnormal    Specimen: Vagina; Swab   Result Value Ref Range    Trichomonas Absent Absent    Yeast Present (A) Absent    Clue Cells Present (A) Absent    WBCs/high power field 3+ (A) None       Problem history    Patient Active Problem List   Diagnosis     Dyspepsia    Lumbago    Chronic mixed headache syndrome    Uterine leiomyoma, unspecified location    AMA (advanced maternal age) multigravida 35+    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    Somatic dysfunction of lumbar region    Endometritis    GERD (gastroesophageal reflux disease)    Morbid obesity (H)    Pain in joint involving ankle and foot, left    Pain in joint involving ankle and foot, right       Current Meds    Current Outpatient Medications:     fluconazole (DIFLUCAN) 150 MG tablet, Take 1 tablet (150 mg) by mouth every 3 days for 3 doses, Disp: 3 tablet, Rfl: 0    metroNIDAZOLE (FLAGYL) 500 MG tablet, Take 1 tablet (500 mg) by mouth 2 times daily for 7 days, Disp: 14 tablet, Rfl: 0    Ascorbic Acid (VITAMIN C PO), Take by mouth daily (Patient not taking: Reported on 5/12/2024), Disp: , Rfl:     Cholecalciferol (VITAMIN D PO), Take 1,000 Units by mouth daily (Patient not taking: Reported on 5/12/2024), Disp: , Rfl:     dexAMETHasone (DECADRON) 4 MG/ML injection, Use 4 mg or dose determined by provider for iontophoresis. (Patient not taking: Reported on 5/12/2024), Disp: 30 mL, Rfl: 0    ferrous sulfate (FEROSUL) 325 (65 Fe) MG tablet, TAKE 1 TABLET BY MOUTH EVERY DAY WITH BREAKFAST (Patient not taking: Reported on 5/12/2024), Disp: , Rfl:     meloxicam (MOBIC) 15 MG tablet, Take 1 tablet (15 mg) by mouth daily (Patient not taking: Reported on 5/12/2024), Disp: 90 tablet, Rfl: 0    Multiple Vitamins-Minerals (ZINC PO), Take by mouth daily (Patient not taking: Reported on 5/12/2024), Disp: , Rfl:     Allergies  Allergies   Allergen Reactions    Pork Gelatin [Pork (Porcine) Protein] GI Disturbance       SUBJECTIVE    HPI:  Tanya Denny is a 44 year old female who has symptoms of urinary dysuria for 3 day(s).  she denies back pain, nausea, vomiting, fever, and chills, flank pain, vaginal discharge, and vaginal odor.    ROS:      Review of Systems   Constitutional: Negative.  Negative for activity change, chills,  fatigue and fever.   HENT:  Negative for congestion, ear pain, rhinorrhea and sore throat.    Respiratory: Negative.  Negative for cough and shortness of breath.    Cardiovascular: Negative.  Negative for chest pain and palpitations.   Gastrointestinal: Negative.  Negative for abdominal pain, diarrhea, nausea and vomiting.   Endocrine: Negative.  Negative for polydipsia and polyuria.   Genitourinary:  Positive for dysuria. Negative for flank pain, frequency, hematuria, pelvic pain, urgency, vaginal discharge and vaginal pain.   Musculoskeletal: Negative.  Negative for myalgias, neck pain and neck stiffness.   Allergic/Immunologic: Negative for immunocompromised state.   Neurological: Negative.  Negative for dizziness, weakness, light-headedness and headaches.   Hematological:  Negative for adenopathy.       Family History   Family History   Problem Relation Age of Onset    Allergies Mother         angioedema in family members unspecified    Diabetes Mother     Hypertension Mother     Diabetes Father     Hypertension Father     Diabetes Maternal Grandmother     Hypertension Maternal Grandmother     Asthma Maternal Grandmother     Diabetes Maternal Grandfather     Hypertension Maternal Grandfather     Asthma Maternal Grandfather     Cancer No family hx of     Cerebrovascular Disease No family hx of     Thyroid Disease No family hx of     Glaucoma No family hx of     Macular Degeneration No family hx of     Breast Cancer No family hx of     Colon Cancer No family hx of     Depression No family hx of     Anxiety Disorder No family hx of     Genetic Disorder No family hx of         Social History  Social History     Socioeconomic History    Marital status:      Spouse name: Not on file    Number of children: Not on file    Years of education: Not on file    Highest education level: Not on file   Occupational History    Not on file   Tobacco Use    Smoking status: Never    Smokeless tobacco: Never   Vaping Use     Vaping status: Never Used   Substance and Sexual Activity    Alcohol use: No    Drug use: No    Sexual activity: Yes     Partners: Male     Birth control/protection: None   Other Topics Concern    Parent/sibling w/ CABG, MI or angioplasty before 65F 55M? No     Service No    Blood Transfusions No    Caffeine Concern No    Occupational Exposure No    Hobby Hazards No    Sleep Concern No    Stress Concern No    Weight Concern No    Special Diet Yes    Back Care No    Exercise Yes    Bike Helmet No    Seat Belt Yes    Self-Exams Yes   Social History Narrative    Mother of 4, all starting school this yearWorks in  can bring her kids there too.  is a teacher , out of work now.         How much exercise per week? 3 days     How much calcium per day? 1 serving      How much caffeine per day? 3 cups    How much vitamin D per day?  prenatals    Do you/your family wear seatbelts?  Yes    Do you/your family use safety helmets? na    Do you/your family use sunscreen?No    Do you/your family keep firearms in the home? No    Do you/your family have a smoke detector(s)? Yes        Do you feel safe in your home? Yes    Has anyone ever touched you in an unwanted manner?      Explain         June 11, 2014 Shaneka Abbasi MISSY        Reviewed [facility] 12-         Social Determinants of Health     Financial Resource Strain: Low Risk  (1/9/2024)    Financial Resource Strain     Within the past 12 months, have you or your family members you live with been unable to get utilities (heat, electricity) when it was really needed?: No   Food Insecurity: Low Risk  (1/9/2024)    Food Insecurity     Within the past 12 months, did you worry that your food would run out before you got money to buy more?: No     Within the past 12 months, did the food you bought just not last and you didn t have money to get more?: No   Transportation Needs: Low Risk  (1/9/2024)    Transportation Needs     Within the past 12 months, has  lack of transportation kept you from medical appointments, getting your medicines, non-medical meetings or appointments, work, or from getting things that you need?: No   Physical Activity: Not on file   Stress: Not on file   Social Connections: Not on file   Interpersonal Safety: Low Risk  (12/12/2023)    Interpersonal Safety     Do you feel physically and emotionally safe where you currently live?: Yes     Within the past 12 months, have you been hit, slapped, kicked or otherwise physically hurt by someone?: No     Within the past 12 months, have you been humiliated or emotionally abused in other ways by your partner or ex-partner?: No   Housing Stability: Low Risk  (1/9/2024)    Housing Stability     Do you have housing? : Yes     Are you worried about losing your housing?: No   Recent Concern: Housing Stability - High Risk (12/12/2023)    Housing Stability     Do you have housing? : No     Are you worried about losing your housing?: No           OBJECTIVE     Vital signs noted and reviewed by Ant Keith PA-C  /79 (BP Location: Left arm, Patient Position: Sitting, Cuff Size: Adult Large)   Pulse 83   Temp 98.4  F (36.9  C) (Tympanic)   Resp 16   Wt 92.9 kg (204 lb 12.8 oz)   SpO2 98%   BMI 35.15 kg/m       Physical Exam  Vitals and nursing note reviewed.   Constitutional:       General: She is not in acute distress.     Appearance: Normal appearance. She is well-developed. She is not ill-appearing, toxic-appearing or diaphoretic.   HENT:      Head: Normocephalic and atraumatic.      Right Ear: Tympanic membrane and external ear normal.      Left Ear: Tympanic membrane and external ear normal.   Eyes:      Pupils: Pupils are equal, round, and reactive to light.   Cardiovascular:      Rate and Rhythm: Normal rate and regular rhythm.      Heart sounds: Normal heart sounds. No murmur heard.     No friction rub. No gallop.   Pulmonary:      Effort: Pulmonary effort is normal. No respiratory distress.       Breath sounds: Normal breath sounds. No wheezing or rales.   Chest:      Chest wall: No tenderness.   Abdominal:      General: Bowel sounds are normal. There is no distension.      Palpations: Abdomen is soft. Abdomen is not rigid. There is no mass.      Tenderness: There is no abdominal tenderness. There is no guarding or rebound. Negative signs include Ugarte's sign and McBurney's sign.   Musculoskeletal:      Cervical back: Normal range of motion and neck supple.   Lymphadenopathy:      Cervical: No cervical adenopathy.   Skin:     General: Skin is warm and dry.   Neurological:      Mental Status: She is alert and oriented to person, place, and time.      Cranial Nerves: No cranial nerve deficit.      Deep Tendon Reflexes: Reflexes are normal and symmetric.   Psychiatric:         Behavior: Behavior normal. Behavior is cooperative.         Thought Content: Thought content normal.         Judgment: Judgment normal.             Ant Keith PA-C  5/12/2024, 9:14 AM

## 2024-05-15 ENCOUNTER — THERAPY VISIT (OUTPATIENT)
Dept: PHYSICAL THERAPY | Facility: CLINIC | Age: 44
End: 2024-05-15
Payer: COMMERCIAL

## 2024-05-15 DIAGNOSIS — M25.571 PAIN IN JOINT INVOLVING ANKLE AND FOOT, RIGHT: Primary | ICD-10-CM

## 2024-05-15 PROCEDURE — 97140 MANUAL THERAPY 1/> REGIONS: CPT | Mod: GP | Performed by: PHYSICAL THERAPIST

## 2024-05-15 PROCEDURE — 97033 APP MDLTY 1+IONTPHRSIS EA 15: CPT | Mod: GP | Performed by: PHYSICAL THERAPIST

## 2024-05-15 NOTE — PROGRESS NOTES
05/15/24 0500   Appointment Info   Signing clinician's name / credentials Elvis Villela DPT   Total/Authorized Visits 12 (PT POC)   Visits Used 3   Medical Diagnosis Plantar fasciitis, bilateral   PT Tx Diagnosis Plantar fasciitis, bilateral; posterior tibialis tendinosis, left; fibularis tendinosis   Quick Adds Certification   Progress Note/Certification   Start of Care Date 02/19/24   Onset of illness/injury or Date of Surgery 01/18/24  (chronic; date of new orders)   Therapy Frequency 1x/week   Predicted Duration 8 weeks   Certification date from 05/14/24   Certification date to 07/10/24   Progress Note Due Date 04/01/24   Progress Note Completed Date 02/19/24   PT Goal 1   Goal Identifier Walking goal   Goal Description Patient will be able to walk initially in the AM without pain and have <3/10 pain in foot throughout the day at work   Rationale to maximize safety and independence with performance of ADLs and functional tasks;to maximize safety and independence within the home;to maximize safety and independence with self cares   Goal Progress 5/10 PL R 3/10 PL L   Target Date 07/10/24   Subjective Report   Subjective Report pt reports left side was bugging her but started to get the same symptoms on the right side. started therapy but had to stop cause of  being out of town and had to be at home for her family. Now with the break her right foot is bugging her even more from being on her feet all day as a . Just had injection and that helped with the burning sensation. Pain location on right posterior heel. Left side still better overall but still bothersome.   Objective Measures   Objective Measures Objective Measure 1;Objective Measure 2   Objective Measure 1   Objective Measure AROM   Details DF 9 deg with PF pain, Plantar flexion 48 deg with posteroir lateral ankle pain. inversion   Objective Measure 2   Objective Measure Paation   Details Tender to touch B at medial heel/PF   PT  Modalities   PT Modalities Iontophoresis   Iontophoresis   Iontophoresis Minutes (76174) 25   Iontophoresis -Type Electrode   Intensity 4.0   Frequency 80 mA*min   Location L and R PF insertion at medial heel   Electrode Size medium   Medication dexamethasone   Treatment Interventions (PT)   Interventions Manual Therapy   Therapeutic Procedure/Exercise   Therapeutic Procedures: strength, endurance, ROM, flexibility minutes (09745) 5   PTRx Ther Proc 1 Towel Stretch Gastroc   PTRx Ther Proc 1 - Details Reviewed   PTRx Ther Proc 2 Theraband Ankle Plantarflexion   PTRx Ther Proc 2 - Details Reviewed   PTRx Ther Proc 3 Ankle Eversion Strengthening With Theraband   PTRx Ther Proc 3 - Details Reviewed   PTRx Ther Proc 4 Theraband Ankle Inversion   PTRx Ther Proc 4 - Details Reviewed   PTRx Ther Proc 5 Manual Plantar Fascia Stretch   PTRx Ther Proc 5 - Details Reviewed   PTRx Ther Proc 6 Plantar Fascia Release/Bottle Roll   PTRx Ther Proc 6 - Details Reviewed   PTRx Ther Proc 7 Theraband Ankle Dorsiflexion   PTRx Ther Proc 7 - Details Reviewed   Manual Therapy   Manual Therapy: Mobilization, MFR, MLD, friction massage minutes (41649) 15   Manual Therapy 2 MFR/STM   Manual Therapy 2 - Details Plantar fascia, massage cream, cross friction and deep pressure   Skilled Intervention to decrease pain and promote mobility   Patient Response/Progress tolerates well; dec PL after   Education   Learner/Method No Barriers to Learning;Pictures/Video;Demonstration;Reading;Listening;Patient   Plan   Home program PTRX   Plan for next session resume ionto, manual and HEP if pt continues to make progress.   Total Session Time   Timed Code Treatment Minutes 45   Total Treatment Time (sum of timed and untimed services) 45         [facility]                                                                                   OUTPATIENT PHYSICAL THERAPY    PLAN OF TREATMENT FOR OUTPATIENT REHABILITATION   Patient's  Last Name, First Name, Tanya Callaway YOB: 1980   Provider's Name   [facility]   Medical Record No.  [id number]     Onset Date: 01/18/24 (chronic; date of new orders)  Start of Care Date: 02/19/24     Medical Diagnosis:  Plantar fasciitis, bilateral      PT Treatment Diagnosis:  Plantar fasciitis, bilateral; posterior tibialis tendinosis, left; fibularis tendinosis Plan of Treatment  Frequency/Duration: 1x/week/ 8 weeks    Certification date from 05/14/24 to 07/10/24         See note for plan of treatment details and functional goals     Elvis Villela, PT                         I CERTIFY THE NEED FOR THESE SERVICES FURNISHED UNDER        THIS PLAN OF TREATMENT AND WHILE UNDER MY CARE     (Physician attestation of this document indicates review and certification of the therapy plan).              Referring Provider:  Des Mckeon    Initial Assessment  See Epic Evaluation- Start of Care Date: 02/19/24            PLAN  1 x/ week 8 weeks     Beginning/End Dates of Progress Note Reporting Period:  02/19/24 to 05/15/2024    Referring Provider:  Des Mckeon

## 2024-05-18 ENCOUNTER — ANCILLARY PROCEDURE (OUTPATIENT)
Dept: MRI IMAGING | Facility: CLINIC | Age: 44
End: 2024-05-18
Attending: PHYSICIAN ASSISTANT
Payer: COMMERCIAL

## 2024-05-18 DIAGNOSIS — R70.0 ELEVATED ERYTHROCYTE SEDIMENTATION RATE: ICD-10-CM

## 2024-05-18 DIAGNOSIS — M25.50 POLYARTHRALGIA: ICD-10-CM

## 2024-05-18 DIAGNOSIS — M25.422 ELBOW SWELLING, LEFT: ICD-10-CM

## 2024-05-18 PROCEDURE — A9585 GADOBUTROL INJECTION: HCPCS | Performed by: RADIOLOGY

## 2024-05-18 PROCEDURE — 73223 MRI JOINT UPR EXTR W/O&W/DYE: CPT | Mod: LT | Performed by: RADIOLOGY

## 2024-05-18 RX ORDER — GADOBUTROL 604.72 MG/ML
9.5 INJECTION INTRAVENOUS ONCE
Status: COMPLETED | OUTPATIENT
Start: 2024-05-18 | End: 2024-05-18

## 2024-05-18 RX ADMIN — GADOBUTROL 9.5 ML: 604.72 INJECTION INTRAVENOUS at 08:47

## 2024-05-23 ENCOUNTER — MYC MEDICAL ADVICE (OUTPATIENT)
Dept: RHEUMATOLOGY | Facility: CLINIC | Age: 44
End: 2024-05-23
Payer: COMMERCIAL

## 2024-05-24 NOTE — TELEPHONE ENCOUNTER
Patient has had recent MRI of elbow due to pain and swelling.  Patient sends MyChart asking what next step is per her MRI results.    Routed to [facility]:  Please advise.    Jose STANTON [facility]

## 2024-05-24 NOTE — TELEPHONE ENCOUNTER
Labs and imaging negative for inflammatory arthritis. Would recommend that she follow up with her primary care provider.     Gloria Gan, PAC

## 2024-05-29 ENCOUNTER — THERAPY VISIT (OUTPATIENT)
Dept: PHYSICAL THERAPY | Facility: CLINIC | Age: 44
End: 2024-05-29
Payer: COMMERCIAL

## 2024-05-29 DIAGNOSIS — M25.571 PAIN IN JOINT INVOLVING ANKLE AND FOOT, RIGHT: Primary | ICD-10-CM

## 2024-05-29 PROCEDURE — 97033 APP MDLTY 1+IONTPHRSIS EA 15: CPT | Mod: GP | Performed by: PHYSICAL THERAPY ASSISTANT

## 2024-05-29 PROCEDURE — 97140 MANUAL THERAPY 1/> REGIONS: CPT | Mod: GP | Performed by: PHYSICAL THERAPY ASSISTANT

## 2024-05-30 ENCOUNTER — OFFICE VISIT (OUTPATIENT)
Dept: PODIATRY | Facility: CLINIC | Age: 44
End: 2024-05-30
Payer: COMMERCIAL

## 2024-05-30 DIAGNOSIS — M76.61 ACHILLES BURSITIS OF RIGHT LOWER EXTREMITY: ICD-10-CM

## 2024-05-30 DIAGNOSIS — M72.2 PLANTAR FASCIITIS OF RIGHT FOOT: Primary | ICD-10-CM

## 2024-05-30 DIAGNOSIS — M67.88 PERONEAL TENDINOSIS, RIGHT: ICD-10-CM

## 2024-05-30 PROCEDURE — 99213 OFFICE O/P EST LOW 20 MIN: CPT | Performed by: PODIATRIST

## 2024-05-30 NOTE — NURSING NOTE
Tanya Denny's chief complaint for this visit includes:  Chief Complaint   Patient presents with    Consult     Foot injection follow up     PCP: Mele Floyd    Referring Provider:  No referring provider defined for this encounter.    There were no vitals taken for this visit.  Data Unavailable     Do you need any medication refills at today's visit? NO    Allergies   Allergen Reactions    Pork Gelatin [Pork (Porcine) Protein] GI Disturbance       Fadumo Walton LPN

## 2024-05-30 NOTE — LETTER
2024         RE: Tanya Denny  [address]  [place] [address]        Dear Colleague,    Thank you for referring your patient, Tanya Denny, to the [facility]. Please see a copy of my visit note below.    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     Somatic dysfunction of lumbar region     Endometritis     GERD (gastroesophageal reflux disease)     Morbid obesity (H)     Pain in joint involving ankle and foot, left     Pain in joint involving ankle and foot, right     Past Surgical History:   Procedure Laterality Date     D & C       Social History     Socioeconomic History     Marital status:      Spouse name: Not on file     Number of children: Not on file     Years of education: Not on file     Highest education level: Not on file   Occupational History     Not on file   Tobacco Use     Smoking status: Never     Smokeless tobacco: Never   Vaping Use     Vaping status: Never Used   Substance and Sexual Activity     Alcohol use: No     Drug use: No     Sexual activity: Yes     Partners: Male     Birth control/protection: None   Other Topics Concern     Parent/sibling w/ CABG, MI or angioplasty before 65F 55M? No      Service No     Blood Transfusions No     Caffeine Concern No     Occupational Exposure No     Hobby Hazards No     Sleep Concern No     Stress Concern No     Weight Concern No     Special Diet Yes     Back Care No     Exercise Yes     Bike Helmet No     Seat Belt Yes     Self-Exams Yes   Social History Narrative    Mother of 4, all starting school this yearWorks in  can bring her kids there too.  is a teacher , out of work  now.         How much exercise per week? 3 days     How much calcium per day? 1 serving      How much caffeine per day? 3 cups    How much vitamin D per day?  prenatals    Do you/your family wear seatbelts?  Yes    Do you/your family use safety helmets? na    Do you/your family use sunscreen?No    Do you/your family keep firearms in the home? No    Do you/your family have a smoke detector(s)? Yes        Do you feel safe in your home? Yes    Has anyone ever touched you in an unwanted manner?      Explain         June 11, 2014 Shaneka Abbasi LPN        [facility]         Social Determinants of Health     Financial Resource Strain: Low Risk  (1/9/2024)    Financial Resource Strain      Within the past 12 months, have you or your family members you live with been unable to get utilities (heat, electricity) when it was really needed?: No   Food Insecurity: Low Risk  (1/9/2024)    Food Insecurity      Within the past 12 months, did you worry that your food would run out before you got money to buy more?: No      Within the past 12 months, did the food you bought just not last and you didn t have money to get more?: No   Transportation Needs: Low Risk  (1/9/2024)    Transportation Needs      Within the past 12 months, has lack of transportation kept you from medical appointments, getting your medicines, non-medical meetings or appointments, work, or from getting things that you need?: No   Physical Activity: Not on file   Stress: Not on file   Social Connections: Not on file   Interpersonal Safety: Low Risk  (12/12/2023)    Interpersonal Safety      Do you feel physically and emotionally safe where you currently live?: Yes      Within the past 12 months, have you been hit, slapped, kicked or otherwise physically hurt by someone?: No      Within the past 12 months, have you been humiliated or emotionally abused in other ways by your partner or ex-partner?: No   Housing Stability: Low Risk  (1/9/2024)     Housing Stability      Do you have housing? : Yes      Are you worried about losing your housing?: No   Recent Concern: Housing Stability - High Risk (12/12/2023)    Housing Stability      Do you have housing? : No      Are you worried about losing your housing?: No     Family History   Problem Relation Age of Onset     Allergies Mother         angioedema in family members unspecified     Diabetes Mother      Hypertension Mother      Diabetes Father      Hypertension Father      Diabetes Maternal Grandmother      Hypertension Maternal Grandmother      Asthma Maternal Grandmother      Diabetes Maternal Grandfather      Hypertension Maternal Grandfather      Asthma Maternal Grandfather      Cancer No family hx of      Cerebrovascular Disease No family hx of      Thyroid Disease No family hx of      Glaucoma No family hx of      Macular Degeneration No family hx of      Breast Cancer No family hx of      Colon Cancer No family hx of      Depression No family hx of      Anxiety Disorder No family hx of      Genetic Disorder No family hx of            Subjective findings- 44-year-old returns clinic for Plantar Fasciitis and Peroneal tendinopathy right.  She relates she is doing physical therapy, I reviewed physical therapy notes, she feels that is helping, relates the shot helped, relates she still is getting pain and the pain is now more in the posterior heel, relates she had 3 days off work and the foot felt much better, relates she is on her feet at work, relates to no injuries since we seen her last, relates she is wearing the ankle brace, relates her foot orthotics are worn.    Objective findings- DP and PT are 2 out of 4 right.  Has mild pain on palpation of posterior right heel, there is no erythema, no drainage, no odor, no calor, no drainage, no edema, no tendon voids right.    Assessment and plan- Plantar Fasciitis right, Peroneal tendinopathy right, these have improved, Achilles Bursitis right.  Diagnosis and  treatment options discussed with the patient.  Continue physical therapy.  Continue ankle brace.  Prescription for custom for orthotics given and she is given the phone number address orthotics and prosthetics for these.  She opted for no time off work and relates she has 2 weeks of work left and then she is off for the summer, so she can rest the foot.  Previous notes reviewed.  Return to clinic and see me as needed.                          Low level of medical decision making.      Again, thank you for allowing me to participate in the care of your patient.        Sincerely,        Des Mckeon DPM

## 2024-05-30 NOTE — PROGRESS NOTES
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    Somatic dysfunction of lumbar region    Endometritis    GERD (gastroesophageal reflux disease)    Morbid obesity (H)    Pain in joint involving ankle and foot, left    Pain in joint involving ankle and foot, right     Past Surgical History:   Procedure Laterality Date    D & C       Social History     Socioeconomic History    Marital status:      Spouse name: Not on file    Number of children: Not on file    Years of education: Not on file    Highest education level: Not on file   Occupational History    Not on file   Tobacco Use    Smoking status: Never    Smokeless tobacco: Never   Vaping Use    Vaping status: Never Used   Substance and Sexual Activity    Alcohol use: No    Drug use: No    Sexual activity: Yes     Partners: Male     Birth control/protection: None   Other Topics Concern    Parent/sibling w/ CABG, MI or angioplasty before 65F 55M? No     Service No    Blood Transfusions No    Caffeine Concern No    Occupational Exposure No    Hobby Hazards No    Sleep Concern No    Stress Concern No    Weight Concern No    Special Diet Yes    Back Care No    Exercise Yes    Bike Helmet No    Seat Belt Yes    Self-Exams Yes   Social History Narrative    Mother of 4, all starting school this yearWorks in  can bring her kids there too.  is a teacher , out of work now.         How much exercise per week? 3 days     How much calcium per day? 1 serving      How much caffeine per day? 3 cups    How much vitamin D per day?  prenatals    Do you/your family wear seatbelts?  Yes    Do you/your family use safety helmets? na    Do you/your family use sunscreen?No    Do you/your  family keep firearms in the home? No    Do you/your family have a smoke detector(s)? Yes        Do you feel safe in your home? Yes    Has anyone ever touched you in an unwanted manner?      Explain         June 11, 2014 Shaneka Abbasi LPN        Reviewed [facility] 12-         Social Determinants of Health     Financial Resource Strain: Low Risk  (1/9/2024)    Financial Resource Strain     Within the past 12 months, have you or your family members you live with been unable to get utilities (heat, electricity) when it was really needed?: No   Food Insecurity: Low Risk  (1/9/2024)    Food Insecurity     Within the past 12 months, did you worry that your food would run out before you got money to buy more?: No     Within the past 12 months, did the food you bought just not last and you didn t have money to get more?: No   Transportation Needs: Low Risk  (1/9/2024)    Transportation Needs     Within the past 12 months, has lack of transportation kept you from medical appointments, getting your medicines, non-medical meetings or appointments, work, or from getting things that you need?: No   Physical Activity: Not on file   Stress: Not on file   Social Connections: Not on file   Interpersonal Safety: Low Risk  (12/12/2023)    Interpersonal Safety     Do you feel physically and emotionally safe where you currently live?: Yes     Within the past 12 months, have you been hit, slapped, kicked or otherwise physically hurt by someone?: No     Within the past 12 months, have you been humiliated or emotionally abused in other ways by your partner or ex-partner?: No   Housing Stability: Low Risk  (1/9/2024)    Housing Stability     Do you have housing? : Yes     Are you worried about losing your housing?: No   Recent Concern: Housing Stability - High Risk (12/12/2023)    Housing Stability     Do you have housing? : No     Are you worried about losing your housing?: No     Family History   Problem Relation Age of Onset     Allergies Mother         angioedema in family members unspecified    Diabetes Mother     Hypertension Mother     Diabetes Father     Hypertension Father     Diabetes Maternal Grandmother     Hypertension Maternal Grandmother     Asthma Maternal Grandmother     Diabetes Maternal Grandfather     Hypertension Maternal Grandfather     Asthma Maternal Grandfather     Cancer No family hx of     Cerebrovascular Disease No family hx of     Thyroid Disease No family hx of     Glaucoma No family hx of     Macular Degeneration No family hx of     Breast Cancer No family hx of     Colon Cancer No family hx of     Depression No family hx of     Anxiety Disorder No family hx of     Genetic Disorder No family hx of            Subjective findings- 44-year-old returns clinic for Plantar Fasciitis and Peroneal tendinopathy right.  She relates she is doing physical therapy, I reviewed physical therapy notes, she feels that is helping, relates the shot helped, relates she still is getting pain and the pain is now more in the posterior heel, relates she had 3 days off work and the foot felt much better, relates she is on her feet at work, relates to no injuries since we seen her last, relates she is wearing the ankle brace, relates her foot orthotics are worn.    Objective findings- DP and PT are 2 out of 4 right.  Has mild pain on palpation of posterior right heel, there is no erythema, no drainage, no odor, no calor, no drainage, no edema, no tendon voids right.    Assessment and plan- Plantar Fasciitis right, Peroneal tendinopathy right, these have improved, Achilles Bursitis right.  Diagnosis and treatment options discussed with the patient.  Continue physical therapy.  Continue ankle brace.  Prescription for custom for orthotics given and she is given the phone number address orthotics and prosthetics for these.  She opted for no time off work and relates she has 2 weeks of work left and then she is off for the summer, so she can  rest the foot.  Previous notes reviewed.  Return to clinic and see me as needed.                          Low level of medical decision making.

## 2024-08-01 DIAGNOSIS — K21.9 GASTROESOPHAGEAL REFLUX DISEASE WITHOUT ESOPHAGITIS: Primary | ICD-10-CM

## 2024-08-01 RX ORDER — NICOTINE POLACRILEX 4 MG/1
20 GUM, CHEWING ORAL DAILY
Qty: 90 TABLET | Refills: 0 | Status: SHIPPED | OUTPATIENT
Start: 2024-08-01 | End: 2024-10-04

## 2024-10-04 ENCOUNTER — OFFICE VISIT (OUTPATIENT)
Dept: FAMILY MEDICINE | Facility: CLINIC | Age: 44
End: 2024-10-04
Payer: COMMERCIAL

## 2024-10-04 VITALS
SYSTOLIC BLOOD PRESSURE: 124 MMHG | BODY MASS INDEX: 36.74 KG/M2 | HEIGHT: 64 IN | WEIGHT: 215.2 LBS | TEMPERATURE: 97.4 F | HEART RATE: 71 BPM | DIASTOLIC BLOOD PRESSURE: 84 MMHG | OXYGEN SATURATION: 99 %

## 2024-10-04 DIAGNOSIS — G43.719 INTRACTABLE CHRONIC MIGRAINE WITHOUT AURA AND WITHOUT STATUS MIGRAINOSUS: ICD-10-CM

## 2024-10-04 DIAGNOSIS — R03.0 ELEVATED BLOOD PRESSURE READING WITHOUT DIAGNOSIS OF HYPERTENSION: ICD-10-CM

## 2024-10-04 DIAGNOSIS — K21.9 GASTROESOPHAGEAL REFLUX DISEASE WITHOUT ESOPHAGITIS: Primary | ICD-10-CM

## 2024-10-04 DIAGNOSIS — M77.02 MEDIAL EPICONDYLITIS OF LEFT ELBOW: ICD-10-CM

## 2024-10-04 PROCEDURE — 99214 OFFICE O/P EST MOD 30 MIN: CPT | Performed by: FAMILY MEDICINE

## 2024-10-04 RX ORDER — IBUPROFEN 800 MG/1
800 TABLET, FILM COATED ORAL
COMMUNITY
Start: 2024-08-06

## 2024-10-04 RX ORDER — SUMATRIPTAN 50 MG/1
50-100 TABLET, FILM COATED ORAL
Qty: 18 TABLET | Refills: 1 | Status: SHIPPED | OUTPATIENT
Start: 2024-10-04

## 2024-10-04 RX ORDER — NICOTINE POLACRILEX 4 MG/1
20 GUM, CHEWING ORAL DAILY
Qty: 90 TABLET | Refills: 3 | Status: SHIPPED | OUTPATIENT
Start: 2024-10-04

## 2024-10-04 RX ORDER — NAPROXEN 500 MG/1
500 TABLET ORAL 2 TIMES DAILY PRN
Qty: 60 TABLET | Refills: 0 | Status: SHIPPED | OUTPATIENT
Start: 2024-10-04

## 2024-10-04 ASSESSMENT — PAIN SCALES - GENERAL: PAINLEVEL: SEVERE PAIN (6)

## 2024-10-04 ASSESSMENT — ENCOUNTER SYMPTOMS: HEADACHES: 1

## 2024-10-04 NOTE — PROGRESS NOTES
"  Assessment & Plan     Gastroesophageal reflux disease without esophagitis  Stable, continue present management  - omeprazole 20 MG tablet; Take 1 tablet (20 mg) by mouth daily.    Medial epicondylitis of left elbow  Failed conservative management with ice, rest, physical therapy and NSAIDS  - Orthopedic  Referral; Future    Elevated blood pressure reading without diagnosis of hypertension  DASH diet, weight loss.  She notes home blood pressure of >140 systolic blood pressure but within normal limits at the clinic.  - Home Blood Pressure Monitor Order for DME - ONLY FOR DME    Intractable chronic migraine without aura and without status migrainosus  New, tension or migraine headaches  She has an up to date headache diary  She was counseled on stress management, improved sleep hygiene  - SUMAtriptan (IMITREX) 50 MG tablet; Take 1-2 tablets ( mg) by mouth at onset of headache for migraine. May repeat in 2 hours. Max 4 tablets/24 hours.  - naproxen (NAPROSYN) 500 MG tablet; Take 1 tablet (500 mg) by mouth 2 times daily as needed for moderate pain.          BMI  Estimated body mass index is 37.19 kg/m  as calculated from the following:    Height as of this encounter: 1.62 m (5' 3.78\").    Weight as of this encounter: 97.6 kg (215 lb 3.2 oz).   Weight management plan: Discussed healthy diet and exercise guidelines          Ange Martínez is a 44 year old, presenting for the following health issues:  Headache and Hypertension        10/4/2024     1:12 PM   Additional Questions   Roomed by Jonathon LUU   Accompanied by Self         10/4/2024     1:12 PM   Patient Reported Additional Medications   Patient reports taking the following new medications None     Via the Health Maintenance questionnaire, the patient has reported the following services have been completed -Eye Exam: [facility] [address] 2024-04-09, this information has been sent to the abstraction team.  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  Description  Location: bilateral in the temporal area   Character: throbbing pain  Frequency:  1-2 times/week  Duration:  10-15 minutes  Intensity:  moderate  Accompanying signs and symptoms:  Precipitating or Alleviating factors:  Nausea/vomiting: no  Dizziness: no  Weakness or numbness: no  Visual changes: none  Fever: no   Sinus or URI symptoms no   History  Head trauma: no   Family history of migraines: no   Previous tests for headaches: no   Neurologist evaluations: no   Able to do daily activities when headache present: YES  Wake with headaches: no   Daily pain medication use: no   Any changes in: family   Precipitating or Alleviating factors (light/sound/sleep/caffeine): Sleep  Therapies tried and outcome: Tylenol    Outcome - effective  Frequent/daily pain medication use: no     Musculoskeletal problem/pain    Duration:Chronic left elbow pain  Description  Location: Medial epicondylitis  Intensity:  mild-moderate  Accompanying signs and symptoms: none  History  Previous similar problem: no   Previous evaluation:  none  Precipitating or alleviating factors:  Trauma or overuse: no   Aggravating factors include: none  Therapies tried and outcome: nothing    Refill Omeprazole 20 mg daily, GERD is stable, uses as needed, no alarming symptoms.      Review of Systems  Constitutional, HEENT, cardiovascular, pulmonary, GI, , musculoskeletal, neuro, skin, endocrine and psych systems are negative, except as otherwise noted.      Objective    /84 (BP Location: Left arm, Patient Position: Sitting, Cuff Size: Adult Regular)   Pulse 71   Temp 97.4  F (36.3  C) (Temporal)   Ht 1.62 m (5' 3.78\")   Wt 97.6 kg (215 lb 3.2 oz)   LMP 09/30/2024 (Exact Date)   SpO2 99%   Breastfeeding No   BMI 37.19 kg/m    Body mass index is 37.19 kg/m .  Physical Exam   GENERAL: alert and no distress  EYES: Eyes " grossly normal to inspection, PERRL and conjunctivae and sclerae normal  HENT: ear canals and TM's normal, nose and mouth without ulcers or lesions  NECK: no adenopathy, no asymmetry, masses, or scars  RESP: lungs clear to auscultation - no rales, rhonchi or wheezes  CV: regular rate and rhythm, normal S1 S2, no S3 or S4, no murmur, click or rub, no peripheral edema  ABDOMEN: soft, nontender, no hepatosplenomegaly, no masses and bowel sounds normal  MS: no gross musculoskeletal defects noted, no edema  NEURO: Normal strength and tone, mentation intact and speech normal  PSYCH: mentation appears normal, affect normal/bright            Signed Electronically by: Mele Floyd MD    Answers submitted by the patient for this visit:  General Questionnaire (Submitted on 10/4/2024)  Chief Complaint: Chronic problems general questions HPI Form  What is the reason for your visit today? : headache  How many days per week do you miss taking your medication?: 0

## 2024-10-07 ENCOUNTER — PATIENT OUTREACH (OUTPATIENT)
Dept: CARE COORDINATION | Facility: CLINIC | Age: 44
End: 2024-10-07
Payer: COMMERCIAL

## 2024-10-09 ENCOUNTER — PATIENT OUTREACH (OUTPATIENT)
Dept: CARE COORDINATION | Facility: CLINIC | Age: 44
End: 2024-10-09
Payer: COMMERCIAL

## 2024-11-19 DIAGNOSIS — G89.29 CHRONIC HEEL PAIN, UNSPECIFIED LATERALITY: ICD-10-CM

## 2024-11-19 DIAGNOSIS — M79.673 CHRONIC HEEL PAIN, UNSPECIFIED LATERALITY: ICD-10-CM

## 2024-11-19 NOTE — TELEPHONE ENCOUNTER
PHARMACY REQUEST for med not on active med list. Please advise    diclofenac (VOLTAREN) 1 % topical gel (Discontinued)

## 2024-11-19 NOTE — TELEPHONE ENCOUNTER
Clinic RN: Please investigate patient's chart or contact patient if the information cannot be found because the medication is listed as historical or discontinued. Confirm patient is taking this medication. Document findings and route refill encounter to provider for approval or denial.    Barbra Robledo RN

## 2024-11-21 NOTE — PROGRESS NOTES
Tanya Denny  :  1980  DOS: 2024  MRN: [id number]  PCP: Mele Floyd A    Sports Medicine Clinic Visit      HPI  Tanya Denny is a 44 year old female who is seen as a self referral presenting with left elbow pain.    - Mechanism of Injury:    - No inciting injury  - Pertinent history and prior evaluations:    -History of medial epicondylitis.     -10/4/2024 with Dr. Floyd: Notes failed conservative measures of treatment for medial epicondylitis.    -2024 MRI left elbow shows   1. Small amount of fluid in the left elbow joint. No synovitis.  2. No erosive changes in the elbow. No MRI findings to suggest an  inflammatory process. No enhancing soft tissue masses or fluid  collections.    3. The tendinous and ligamentous structures about the left elbow are  intact.    - 4/3/2024 with CYNTHIA TobarC: Left elbow swelling. Labs drawn. MRI ordered.     - Pain Character:    - Location:  medial left elbow with radiation into the left shoulder and hand  - Character:  cramping in hand, aching, feeling of being hit by a hammer  - Duration:  2-3 years  - Course:  worsening  - Endorses:    -  tingling in fingers 4 and 5 when swollen over medial elbow  - Denies:    - clicking/popping, grinding, mechanical locking symptoms, instability, numbness, radicular shooting pain, weakness  - Alleviating factors:    -  mildly with Voltaren and muscle relaxers on weekend  - Aggravating factors:    -  unknown  - Other treatments tried:    -  Tylenol, ibuprofen, topical voltaren gel    - Patient Goals:    - get a formal diagnosis, discuss treatment options  - Social History:   -  [employer]      Review of Systems  Musculoskeletal: as above  Remainder of review of systems is negative including constitutional, CV, pulmonary, GI, Skin and Neurologic except as noted in HPI or medical history.    Past Medical History:   Diagnosis Date    Migraine     SAB (spontaneous )      1ST TRIMESTER     Past Surgical History:   Procedure Laterality Date    D & C  12/05     Family History   Problem Relation Age of Onset    Allergies Mother         angioedema in family members unspecified    Diabetes Mother     Hypertension Mother     Diabetes Father     Hypertension Father     Diabetes Maternal Grandmother     Hypertension Maternal Grandmother     Asthma Maternal Grandmother     Diabetes Maternal Grandfather     Hypertension Maternal Grandfather     Asthma Maternal Grandfather     Cancer No family hx of     Cerebrovascular Disease No family hx of     Thyroid Disease No family hx of     Glaucoma No family hx of     Macular Degeneration No family hx of     Breast Cancer No family hx of     Colon Cancer No family hx of     Depression No family hx of     Anxiety Disorder No family hx of     Genetic Disorder No family hx of          Objective  LMP 09/30/2024 (Exact Date)     General: healthy, alert and in no acute distress.    HEENT: no scleral icterus or conjunctival erythema.   Skin: no suspicious lesions or rash. No jaundice.   CV: regular rhythm by palpation, 2+ distal pulses.  Resp: normal respiratory effort without conversational dyspnea.   Psych: normal mood and affect.    Gait: nonantalgic, appropriate coordination and balance.     Neuro:        - Sensation to light touch:    - Intact throughout the BUE including all peripheral nerve distributions.        - MSR:       RUE  LUE  - Biceps  2+ 2+  - Brachioradialis 2+ 2+  - Triceps  2+ 2+       - Special tests:   - Spurling's:  Neg    - Tinel's at the wrist:  Neg    - Tinel's at the elbow:  Neg     MSK - Elbow:        - Inspection:    -Mild swelling is present just proximal to the medial epicondyle without surrounding erythema, ecchymosis, lesion, atrophy.       - ROM:    - Full AROM/PROM with mild pain at the medial epicondyle with wrist flexion and pronation       - Palpation:    - TTP at the medial epicondyle, common flexor tendon.   - NTTP  elsewhere.        - Strength:   (*antalgic)  - Shoulder Abduction   5     - Shoulder Internal Rotation  5    - Shoulder External Rotation  5   - Elbow Flexion   5*   - Elbow Extension   5   - Forearm Pronation   5*   - Forearm Supination   5   - Wrist Extension   5   - Wrist Flexion    5*   - FDI     5   - ADM     5   - FPL     5   - APB     5   - EIP     5   - EDC     5   - APL/EPB    5            - Special tests:  - Cozen's:  Neg  - Reverse Cozen's:  Slightly Pos   - VEO:  Neg   - Valgus stress:  Neg    - Varus stress:  Neg       Radiology  I independently reviewed the available relevant imaging in the chart with my interpretations as above in HPI.       Assessment  No diagnosis found.    Plan  Tanya Denny is a pleasant 44 year old female that presents with chronic medial left elbow pain.  She has undergone evaluation and treatment for this condition in the past.  She has done an x-ray which did not show acute osseous abnormality.  She obtained an MRI in May 2024 for the left elbow that showed a very small effusion with no synovitis, no erosive changes within the elbow joint, no masses or fluid collections in the tendons and ligaments surrounding the elbow are intact.  There is mild prominence of the ulnar nerve at the cubital tunnel.  She describes pain at the medial epicondyle with slight radiation proximally and distally.  There is overlying soft tissue swelling just proximal to the medial epicondyle.  Painful with wrist flexion and pronation.  Reverse Cozen's is positive.    Her area of swelling just proximal to medial epicondyle is interesting as it is not necessarily typical for medial epicondylosis, however the rest of her history and physical exam seem most consistent with medial epicondylosis. We discussed the nature of the condition and available treatment options, and mutually agreed upon the following plan:    - Imaging:          - Reviewed and independently interpreted the relevant imaging  in the chart, including any imaging ordered for today's clinic.  - Reviewed results and images with patient.   - Medications:          - Discussed pharmacologic options for pain relief.   - May use NSAIDs (Ibuprofen, Naproxen) or Acetaminophen (Tylenol) as needed for pain control.   - Do not take these if previously advised to avoid them for other medical conditions.  - May also use topical medications such as lidocaine, IcyHot, BioFreeze, or Voltaren gel as needed for pain control.    - Voltaren gel is an anti-inflammatory cream that may be used up to 4 times per day over the painful area.   - Injections:          - Discussed possible injection options and alternatives.    - Injection options include: Corticosteroid injection of the common flexor tendon sheath, PRP    - Deferred injections today and will consider them in the future as needed.   - Therapy:          - Discussed the benefits of therapy vs home exercise program for optimization of range of motion, flexibility, strength, stability and function.   - Preference is for a home exercise program.   - Home Exercise Program given today in clinic and recommendation given to perform HEP daily and after exacerbations.  - Modalities:          - May use ice, heat, massage or other modalities as needed.   - Bracing:          - Discussed bracing options and recommend using a [employer] elbow counterforce strap or wrist brace during exacerbating activities.    - Options presented in clinic and choice given to take our brace from clinic or purchase an equivalent brace from an outside source.  - Activity:          - Encouraged to remain active and participate in regular activities as symptoms allow.   Avoid or modify exacerbating activities as needed.  - Follow up:          - In 3 to 4 weeks as needed for re-evaluation and update to treatment plan.  - May follow up sooner for new/worsening symptoms.  - May contact clinic by phone or MyChart for questions or concerns.        Jayro Rizo DO CAQSM  [facility] - Department of Orthopedic Surgery       Disclaimer:  This note was prepared and written using Dragon Medical dictation software. As a result, there may be errors in the script that have gone undetected. Please consider this when interpreting the information in this note.

## 2024-11-21 NOTE — TELEPHONE ENCOUNTER
REASON FOR VISIT: Medial epicondylitis of left elbow [M77.02]    DATE OF APPT: 11/22/2024   NOTES (FOR ALL VISITS) STATUS DETAILS   OFFICE NOTE from referring provider Internal [facility] Mele Panchal MD 10/04/2024   EMG N/A    MEDICATION LIST Internal    IMAGING  (FOR ALL VISITS)     XR Internal [facility]  XR Elbow left 4/03/2024   MRI (HEAD, NECK, SPINE) Internal [facility]  MR Elbow left 5/18/2024   CT (HEAD, NECK, SPINE) N/A

## 2024-11-22 ENCOUNTER — PRE VISIT (OUTPATIENT)
Dept: ORTHOPEDICS | Facility: CLINIC | Age: 44
End: 2024-11-22

## 2024-11-22 ENCOUNTER — OFFICE VISIT (OUTPATIENT)
Dept: ORTHOPEDICS | Facility: CLINIC | Age: 44
End: 2024-11-22
Payer: COMMERCIAL

## 2024-11-22 DIAGNOSIS — M77.02 MEDIAL EPICONDYLITIS OF LEFT ELBOW: Primary | ICD-10-CM

## 2024-11-22 PROCEDURE — 99204 OFFICE O/P NEW MOD 45 MIN: CPT | Performed by: STUDENT IN AN ORGANIZED HEALTH CARE EDUCATION/TRAINING PROGRAM

## 2024-11-22 NOTE — PATIENT INSTRUCTIONS
"- Golfer's elbow counterforce strap (a tennis elbow strap can be used and just position it over the flexor tendons (inside elbow) rather than the extensor tendons (outside elbow).   - Wear during activities that cause pain in the elbow.  - Example from [employer]:  \"[employer] SPORT Tennis Elbow Brace With Compression Pad (2-Count) - Effective Pain Relief for Tennis & Golfer s Elbow for Men & Women      "

## 2024-11-22 NOTE — LETTER
2024      Tanya Denny  [address]  [place] [address]      Dear Colleague,    Thank you for referring your patient, Tanya Denny, to the [facility] [place]. Please see a copy of my visit note below.    Tanya Denny  :  1980  DOS: 2024  MRN: [id number]  PCP: Mele Floyd A    Sports Medicine Clinic Visit      HPI  Tanya Denny is a 44 year old female who is seen as a self referral presenting with left elbow pain.    - Mechanism of Injury:    - No inciting injury  - Pertinent history and prior evaluations:    -History of medial epicondylitis.     -10/4/2024 with Dr. Floyd: Notes failed conservative measures of treatment for medial epicondylitis.    -2024 MRI left elbow shows   1. Small amount of fluid in the left elbow joint. No synovitis.  2. No erosive changes in the elbow. No MRI findings to suggest an  inflammatory process. No enhancing soft tissue masses or fluid  collections.    3. The tendinous and ligamentous structures about the left elbow are  intact.    - 4/3/2024 with Gloria Gan PA-C: Left elbow swelling. Labs drawn. MRI ordered.     - Pain Character:    - Location:  medial left elbow with radiation into the left shoulder and hand  - Character:  cramping in hand, aching, feeling of being hit by a hammer  - Duration:  2-3 years  - Course:  worsening  - Endorses:    -  tingling in fingers 4 and 5 when swollen over medial elbow  - Denies:    - clicking/popping, grinding, mechanical locking symptoms, instability, numbness, radicular shooting pain, weakness  - Alleviating factors:    -  mildly with Voltaren and muscle relaxers on weekend  - Aggravating factors:    -  unknown  - Other treatments tried:    -  Tylenol, ibuprofen, topical voltaren gel    - Patient Goals:    - get a formal diagnosis, discuss treatment options  - Social History:   -  [employer]      Review  of Systems  Musculoskeletal: as above  Remainder of review of systems is negative including constitutional, CV, pulmonary, GI, Skin and Neurologic except as noted in HPI or medical history.    Past Medical History:   Diagnosis Date     Migraine      SAB (spontaneous )     1ST TRIMESTER     Past Surgical History:   Procedure Laterality Date     D & C       Family History   Problem Relation Age of Onset     Allergies Mother         angioedema in family members unspecified     Diabetes Mother      Hypertension Mother      Diabetes Father      Hypertension Father      Diabetes Maternal Grandmother      Hypertension Maternal Grandmother      Asthma Maternal Grandmother      Diabetes Maternal Grandfather      Hypertension Maternal Grandfather      Asthma Maternal Grandfather      Cancer No family hx of      Cerebrovascular Disease No family hx of      Thyroid Disease No family hx of      Glaucoma No family hx of      Macular Degeneration No family hx of      Breast Cancer No family hx of      Colon Cancer No family hx of      Depression No family hx of      Anxiety Disorder No family hx of      Genetic Disorder No family hx of          Objective  LMP 2024 (Exact Date)     General: healthy, alert and in no acute distress.    HEENT: no scleral icterus or conjunctival erythema.   Skin: no suspicious lesions or rash. No jaundice.   CV: regular rhythm by palpation, 2+ distal pulses.  Resp: normal respiratory effort without conversational dyspnea.   Psych: normal mood and affect.    Gait: nonantalgic, appropriate coordination and balance.     Neuro:        - Sensation to light touch:    - Intact throughout the BUE including all peripheral nerve distributions.        - MSR:       RUE  LUE  - Biceps  2+ 2+  - Brachioradialis 2+ 2+  - Triceps  2+ 2+       - Special tests:   - Spurling's:  Neg    - Tinel's at the wrist:  Neg    - Tinel's at the elbow:  Neg     MSK - Elbow:        - Inspection:    -Mild swelling  is present just proximal to the medial epicondyle without surrounding erythema, ecchymosis, lesion, atrophy.       - ROM:    - Full AROM/PROM with mild pain at the medial epicondyle with wrist flexion and pronation       - Palpation:    - TTP at the medial epicondyle, common flexor tendon.   - NTTP elsewhere.        - Strength:   (*antalgic)  - Shoulder Abduction   5     - Shoulder Internal Rotation  5    - Shoulder External Rotation  5   - Elbow Flexion   5*   - Elbow Extension   5   - Forearm Pronation   5*   - Forearm Supination   5   - Wrist Extension   5   - Wrist Flexion    5*   - FDI     5   - ADM     5   - FPL     5   - APB     5   - EIP     5   - EDC     5   - APL/EPB    5            - Special tests:  - Cozen's:  Neg  - Reverse Cozen's:  Slightly Pos   - VEO:  Neg   - Valgus stress:  Neg    - Varus stress:  Neg       Radiology  I independently reviewed the available relevant imaging in the chart with my interpretations as above in HPI.       Assessment  No diagnosis found.    Jenelle Denny is a pleasant 44 year old female that presents with chronic medial left elbow pain.  She has undergone evaluation and treatment for this condition in the past.  She has done an x-ray which did not show acute osseous abnormality.  She obtained an MRI in May 2024 for the left elbow that showed a very small effusion with no synovitis, no erosive changes within the elbow joint, no masses or fluid collections in the tendons and ligaments surrounding the elbow are intact.  There is mild prominence of the ulnar nerve at the cubital tunnel.  She describes pain at the medial epicondyle with slight radiation proximally and distally.  There is overlying soft tissue swelling just proximal to the medial epicondyle.  Painful with wrist flexion and pronation.  Reverse Cozen's is positive.    Her area of swelling just proximal to medial epicondyle is interesting as it is not necessarily typical for medial epicondylosis,  however the rest of her history and physical exam seem most consistent with medial epicondylosis. We discussed the nature of the condition and available treatment options, and mutually agreed upon the following plan:    - Imaging:          - Reviewed and independently interpreted the relevant imaging in the chart, including any imaging ordered for today's clinic.  - Reviewed results and images with patient.   - Medications:          - Discussed pharmacologic options for pain relief.   - May use NSAIDs (Ibuprofen, Naproxen) or Acetaminophen (Tylenol) as needed for pain control.   - Do not take these if previously advised to avoid them for other medical conditions.  - May also use topical medications such as lidocaine, IcyHot, BioFreeze, or Voltaren gel as needed for pain control.    - Voltaren gel is an anti-inflammatory cream that may be used up to 4 times per day over the painful area.   - Injections:          - Discussed possible injection options and alternatives.    - Injection options include: Corticosteroid injection of the common flexor tendon sheath, PRP    - Deferred injections today and will consider them in the future as needed.   - Therapy:          - Discussed the benefits of therapy vs home exercise program for optimization of range of motion, flexibility, strength, stability and function.   - Preference is for a home exercise program.   - Home Exercise Program given today in clinic and recommendation given to perform HEP daily and after exacerbations.  - Modalities:          - May use ice, heat, massage or other modalities as needed.   - Bracing:          - Discussed bracing options and recommend using a [employer] elbow counterforce strap or wrist brace during exacerbating activities.    - Options presented in clinic and choice given to take our brace from clinic or purchase an equivalent brace from an outside source.  - Activity:          - Encouraged to remain active and participate in regular  activities as symptoms allow.   Avoid or modify exacerbating activities as needed.  - Follow up:          - In 3 to 4 weeks as needed for re-evaluation and update to treatment plan.  - May follow up sooner for new/worsening symptoms.  - May contact clinic by phone or MyChart for questions or concerns.       Jayro Rizo DO, CAQSM  [facility]  [facility] - Department of Orthopedic Surgery       Disclaimer:  This note was prepared and written using Dragon Medical dictation software. As a result, there may be errors in the script that have gone undetected. Please consider this when interpreting the information in this note.          Again, thank you for allowing me to participate in the care of your patient.        Sincerely,        Jayro Rizo DO

## 2025-01-15 ENCOUNTER — OFFICE VISIT (OUTPATIENT)
Dept: FAMILY MEDICINE | Facility: CLINIC | Age: 45
End: 2025-01-15
Attending: FAMILY MEDICINE
Payer: COMMERCIAL

## 2025-01-15 VITALS
RESPIRATION RATE: 18 BRPM | HEART RATE: 70 BPM | HEIGHT: 64 IN | OXYGEN SATURATION: 100 % | BODY MASS INDEX: 37.81 KG/M2 | TEMPERATURE: 97.4 F | DIASTOLIC BLOOD PRESSURE: 82 MMHG | WEIGHT: 221.5 LBS | SYSTOLIC BLOOD PRESSURE: 116 MMHG

## 2025-01-15 DIAGNOSIS — R73.03 PREDIABETES: ICD-10-CM

## 2025-01-15 DIAGNOSIS — E66.812 CLASS 2 SEVERE OBESITY DUE TO EXCESS CALORIES WITH SERIOUS COMORBIDITY AND BODY MASS INDEX (BMI) OF 38.0 TO 38.9 IN ADULT (H): ICD-10-CM

## 2025-01-15 DIAGNOSIS — E66.01 CLASS 2 SEVERE OBESITY DUE TO EXCESS CALORIES WITH SERIOUS COMORBIDITY AND BODY MASS INDEX (BMI) OF 38.0 TO 38.9 IN ADULT (H): ICD-10-CM

## 2025-01-15 DIAGNOSIS — Z13.0 SCREENING FOR ENDOCRINE, METABOLIC AND IMMUNITY DISORDER: ICD-10-CM

## 2025-01-15 DIAGNOSIS — Z13.228 SCREENING FOR ENDOCRINE, METABOLIC AND IMMUNITY DISORDER: ICD-10-CM

## 2025-01-15 DIAGNOSIS — Z00.00 ROUTINE GENERAL MEDICAL EXAMINATION AT A HEALTH CARE FACILITY: Primary | ICD-10-CM

## 2025-01-15 DIAGNOSIS — Z13.29 SCREENING FOR ENDOCRINE, METABOLIC AND IMMUNITY DISORDER: ICD-10-CM

## 2025-01-15 DIAGNOSIS — Z12.31 ENCOUNTER FOR SCREENING MAMMOGRAM FOR BREAST CANCER: ICD-10-CM

## 2025-01-15 DIAGNOSIS — Z13.220 LIPID SCREENING: ICD-10-CM

## 2025-01-15 LAB
ALBUMIN SERPL BCG-MCNC: 4.1 G/DL (ref 3.5–5.2)
ALP SERPL-CCNC: 60 U/L (ref 40–150)
ALT SERPL W P-5'-P-CCNC: 12 U/L (ref 0–50)
ANION GAP SERPL CALCULATED.3IONS-SCNC: 7 MMOL/L (ref 7–15)
AST SERPL W P-5'-P-CCNC: 18 U/L (ref 0–45)
BASOPHILS # BLD AUTO: 0 10E3/UL (ref 0–0.2)
BASOPHILS NFR BLD AUTO: 0 %
BILIRUB SERPL-MCNC: 0.3 MG/DL
BUN SERPL-MCNC: 13.2 MG/DL (ref 6–20)
CALCIUM SERPL-MCNC: 9 MG/DL (ref 8.8–10.4)
CHLORIDE SERPL-SCNC: 106 MMOL/L (ref 98–107)
CHOLEST SERPL-MCNC: 149 MG/DL
CREAT SERPL-MCNC: 0.74 MG/DL (ref 0.51–0.95)
EGFRCR SERPLBLD CKD-EPI 2021: >90 ML/MIN/1.73M2
EOSINOPHIL # BLD AUTO: 0.1 10E3/UL (ref 0–0.7)
EOSINOPHIL NFR BLD AUTO: 2 %
ERYTHROCYTE [DISTWIDTH] IN BLOOD BY AUTOMATED COUNT: 11.9 % (ref 10–15)
EST. AVERAGE GLUCOSE BLD GHB EST-MCNC: 108 MG/DL
FASTING STATUS PATIENT QL REPORTED: YES
FASTING STATUS PATIENT QL REPORTED: YES
GLUCOSE SERPL-MCNC: 112 MG/DL (ref 70–99)
HBA1C MFR BLD: 5.4 % (ref 0–5.6)
HCO3 SERPL-SCNC: 26 MMOL/L (ref 22–29)
HCT VFR BLD AUTO: 34.7 % (ref 35–47)
HDLC SERPL-MCNC: 48 MG/DL
HGB BLD-MCNC: 11.8 G/DL (ref 11.7–15.7)
IMM GRANULOCYTES # BLD: 0 10E3/UL
IMM GRANULOCYTES NFR BLD: 0 %
LDLC SERPL CALC-MCNC: 89 MG/DL
LYMPHOCYTES # BLD AUTO: 1.8 10E3/UL (ref 0.8–5.3)
LYMPHOCYTES NFR BLD AUTO: 31 %
MCH RBC QN AUTO: 27.9 PG (ref 26.5–33)
MCHC RBC AUTO-ENTMCNC: 34 G/DL (ref 31.5–36.5)
MCV RBC AUTO: 82 FL (ref 78–100)
MONOCYTES # BLD AUTO: 0.3 10E3/UL (ref 0–1.3)
MONOCYTES NFR BLD AUTO: 6 %
NEUTROPHILS # BLD AUTO: 3.6 10E3/UL (ref 1.6–8.3)
NEUTROPHILS NFR BLD AUTO: 62 %
NONHDLC SERPL-MCNC: 101 MG/DL
PLATELET # BLD AUTO: 262 10E3/UL (ref 150–450)
POTASSIUM SERPL-SCNC: 4.4 MMOL/L (ref 3.4–5.3)
PROT SERPL-MCNC: 7.2 G/DL (ref 6.4–8.3)
RBC # BLD AUTO: 4.23 10E6/UL (ref 3.8–5.2)
SODIUM SERPL-SCNC: 139 MMOL/L (ref 135–145)
TRIGL SERPL-MCNC: 60 MG/DL
WBC # BLD AUTO: 5.8 10E3/UL (ref 4–11)

## 2025-01-15 PROCEDURE — 83036 HEMOGLOBIN GLYCOSYLATED A1C: CPT | Performed by: FAMILY MEDICINE

## 2025-01-15 PROCEDURE — 85025 COMPLETE CBC W/AUTO DIFF WBC: CPT | Performed by: FAMILY MEDICINE

## 2025-01-15 PROCEDURE — 80061 LIPID PANEL: CPT | Performed by: FAMILY MEDICINE

## 2025-01-15 PROCEDURE — 80053 COMPREHEN METABOLIC PANEL: CPT | Performed by: FAMILY MEDICINE

## 2025-01-15 PROCEDURE — 36415 COLL VENOUS BLD VENIPUNCTURE: CPT | Performed by: FAMILY MEDICINE

## 2025-01-15 PROCEDURE — 99396 PREV VISIT EST AGE 40-64: CPT | Performed by: FAMILY MEDICINE

## 2025-01-15 RX ORDER — PHENTERMINE HYDROCHLORIDE 37.5 MG/1
37.5 TABLET ORAL
Qty: 30 TABLET | Refills: 2 | Status: SHIPPED | OUTPATIENT
Start: 2025-01-15

## 2025-01-15 SDOH — HEALTH STABILITY: PHYSICAL HEALTH: ON AVERAGE, HOW MANY MINUTES DO YOU ENGAGE IN EXERCISE AT THIS LEVEL?: 30 MIN

## 2025-01-15 SDOH — HEALTH STABILITY: PHYSICAL HEALTH: ON AVERAGE, HOW MANY DAYS PER WEEK DO YOU ENGAGE IN MODERATE TO STRENUOUS EXERCISE (LIKE A BRISK WALK)?: 5 DAYS

## 2025-01-15 ASSESSMENT — PAIN SCALES - GENERAL: PAINLEVEL_OUTOF10: NO PAIN (0)

## 2025-01-15 ASSESSMENT — SOCIAL DETERMINANTS OF HEALTH (SDOH)
HOW OFTEN DO YOU GET TOGETHER WITH FRIENDS OR RELATIVES?: ONCE A WEEK
HOW OFTEN DO YOU GET TOGETHER WITH FRIENDS OR RELATIVES?: ONCE A WEEK

## 2025-01-15 NOTE — PROGRESS NOTES
Preventive Care Visit  [facility] GREGORY Floyd MD, Family Medicine  Salvador 15, 2025      {PROVIDER CHARTING PREFERENCE:437999}    Ange Martínez is a 45 year old, presenting for the following:  Physical        1/15/2025     8:07 AM   Additional Questions   Roomed by Nicolasa PERRIN   Accompanied by Self          HPI  ***  {MA/LPN/RN Pre-Provider Visit Orders- hCG/UA/Strep (Optional):213254}  {SUPERLIST (Optional):974978}  {additonal problems for provider to add (Optional):634257}  Health Care Directive  Patient does not have a Health Care Directive: {ADVANCE_DIRECTIVE_STATUS:036255}      1/15/2025   General Health   How would you rate your overall physical health? Good   Feel stress (tense, anxious, or unable to sleep) Not at all         1/15/2025   Nutrition   Three or more servings of calcium each day? Yes   Diet: Skip meals and regular diet   How many servings of fruit and vegetables per day? (!) 2-3   How many sweetened beverages each day? 0-1         1/15/2025   Exercise   Days per week of moderate/strenous exercise 5 days   Average minutes spent exercising at this level 30 min         1/15/2025   Social Factors   Frequency of gathering with friends or relatives Once a week   Worry food won't last until get money to buy more No   Food not last or not have enough money for food? No   Do you have housing? (Housing is defined as stable permanent housing and does not include staying ouside in a car, in a tent, in an abandoned building, in an overnight shelter, or couch-surfing.) No   Are you worried about losing your housing? No   Lack of transportation? No   Unable to get utilities (heat,electricity)? No   Want help with housing or utility concern? No   (!) HOUSING CONCERN PRESENT      1/15/2025   Dental   Dentist two times every year? Yes         1/15/2025   TB Screening   Were you born outside of the [place]? No         Today's PHQ-2 Score:       1/15/2025     8:12 AM   PHQ-2 ( 1999 [employer])    Q1: Little interest or pleasure in doing things 0   Q2: Feeling down, depressed or hopeless 0   PHQ-2 Score 0           1/15/2025   Substance Use   Alcohol more than 3/day or more than 7/wk No   Do you use any other substances recreationally? No     Social History     Tobacco Use    Smoking status: Never    Smokeless tobacco: Never   Vaping Use    Vaping status: Never Used   Substance Use Topics    Alcohol use: No    Drug use: No         12/5/2023   LAST FHS-7 RESULTS   1st degree relative breast or ovarian cancer No   Any relative bilateral breast cancer No   Any male have breast cancer No   Any ONE woman have BOTH breast AND ovarian cancer No   Any woman with breast cancer before 50yrs No   2 or more relatives with breast AND/OR ovarian cancer No   2 or more relatives with breast AND/OR bowel cancer No     {If any of the questions to the FHS7 are answered yes, consider referral for genetic counseling.    Additional indications for genetic referral include personal history of breast or ovarian cancer, genetic mutation in 1st degree relative which increases risk of breast cancer including BRCA1, BRCA2, CAMILA, PALB 2, TP53, CHEK2, PTEN, CDH1, STK11 (per ACS) and/or 1st degree relative with history of pancreatic or high-risk prostate cancer (per NCCN):799071}   {Mammogram Decision Support (Optional):409040}        1/15/2025   STI Screening   New sexual partner(s) since last STI/HIV test? No     History of abnormal Pap smear: { :244795}        Latest Ref Rng & Units 5/6/2016    10:14 AM 5/6/2016    12:00 AM 8/29/2012    12:00 AM   PAP / HPV   PAP (Historical)   NIL  NIL    HPV 16 DNA NEG Negative      HPV 18 DNA NEG Negative      Other HR HPV NEG Negative        ASCVD Risk   The 10-year ASCVD risk score (Asha DE JESUS, et al., 2019) is: 0.8%    Values used to calculate the score:      Age: 45 years      Sex: Female      Is Non- : Yes      Diabetic: No      Tobacco smoker: No      Systolic  "Blood Pressure: 116 mmHg      Is BP treated: No      HDL Cholesterol: 44 mg/dL      Total Cholesterol: 160 mg/dL        1/15/2025   Contraception/Family Planning   Questions about contraception or family planning No     {Provider  REQUIRED FOR AWV Use the storyboard to review patient history, after sections have been marked as reviewed, refresh note to capture documentation:412678}   Reviewed and updated as needed this visit by Provider                    {HISTORY OPTIONS (Optional):730579}    {ROS Picklists (Optional):923566}     Objective    Exam  /82   Pulse 70   Temp 97.4  F (36.3  C) (Temporal)   Resp 18   Ht 1.624 m (5' 3.94\")   Wt 100.5 kg (221 lb 8 oz)   LMP 01/15/2025   SpO2 100%   BMI 38.10 kg/m     Estimated body mass index is 38.1 kg/m  as calculated from the following:    Height as of this encounter: 1.624 m (5' 3.94\").    Weight as of this encounter: 100.5 kg (221 lb 8 oz).    Physical Exam  {Exam Choices (Optional):995757}        Signed Electronically by: Mele Floyd MD  {Email feedback regarding this note to [email or web address]   :602765}  " "hematuria  MUSCULOSKELETAL: NEGATIVE for significant arthralgias or myalgia  NEURO: NEGATIVE for weakness, dizziness or paresthesias  ENDOCRINE: NEGATIVE for temperature intolerance, skin/hair changes  HEME: NEGATIVE for bleeding problems  PSYCHIATRIC: NEGATIVE for changes in mood or affect     Objective    Exam  /82   Pulse 70   Temp 97.4  F (36.3  C) (Temporal)   Resp 18   Ht 1.624 m (5' 3.94\")   Wt 100.5 kg (221 lb 8 oz)   LMP 01/15/2025   SpO2 100%   BMI 38.10 kg/m     Estimated body mass index is 38.1 kg/m  as calculated from the following:    Height as of this encounter: 1.624 m (5' 3.94\").    Weight as of this encounter: 100.5 kg (221 lb 8 oz).    Physical Exam  GENERAL: alert and no distress  EYES: Eyes grossly normal to inspection, PERRL and conjunctivae and sclerae normal  HENT: ear canals and TM's normal, nose and mouth without ulcers or lesions  NECK: no adenopathy, no asymmetry, masses, or scars  RESP: lungs clear to auscultation - no rales, rhonchi or wheezes  CV: regular rate and rhythm, normal S1 S2, no S3 or S4, no murmur, click or rub, no peripheral edema  ABDOMEN: soft, nontender, no hepatosplenomegaly, no masses and bowel sounds normal  vaginal mucosa, normal cervix/adnexa/uterus without masses or discharge  MS: no gross musculoskeletal defects noted, no edema  NEURO: Normal strength and tone, mentation intact and speech normal  PSYCH: mentation appears normal, affect normal/bright        Signed Electronically by: Mele Floyd MD    "

## 2025-01-15 NOTE — PATIENT INSTRUCTIONS
"  Diabetes Screening:  Your results are in the \"prediabetes\" range.   You do not have diabetes  There are steps you can take to delay the onset of diabetes and reduce your risk including:   Eliminate pop and sweetened beverages  Reduce carbohydrates in your diet (breads, pasta, rice, crackers, chips, candy, sweets)  Get regular exercise and lead an active lifestyle  Weight management  We will continue to monitor this trend. Plan to repeat these tests in 12 months.   Please let me know if you would like a referral to a nutritionist to learn more about preventing diabetes.     Interpreting Your Diabetes Screening Labs:  Fasting blood sugar or glucose:  Normal: less than 100  Prediabetes range: 100-125  Diabetes range: >/=126    Hemoglobin a1c: a measure of blood sugar control over the past 3 months:  Normal range: 4.0-5.6%  Prediabetes range: 5.7-6.4%  Diabetes range: 6.5% and higher        Patient Education   Preventive Care Advice   This is general advice given by our system to help you stay healthy. However, your care team may have specific advice just for you. Please talk to your care team about your preventive care needs.  Nutrition  Eat 5 or more servings of fruits and vegetables each day.  Try wheat bread, brown rice and whole grain pasta (instead of white bread, rice, and pasta).  Get enough calcium and vitamin D. Check the label on foods and aim for 100% of the RDA (recommended daily allowance).  Lifestyle  Exercise at least 150 minutes each week  (30 minutes a day, 5 days a week).  Do muscle strengthening activities 2 days a week. These help control your weight and prevent disease.  No smoking.  Wear sunscreen to prevent skin cancer.  Have a dental exam and cleaning every 6 months.  Yearly exams  See your health care team every year to talk about:  Any changes in your health.  Any medicines your care team has prescribed.  Preventive care, family planning, and ways to prevent chronic diseases.  Shots " (vaccines)   HPV shots (up to age 26), if you've never had them before.  Hepatitis B shots (up to age 59), if you've never had them before.  COVID-19 shot: Get this shot when it's due.  Flu shot: Get a flu shot every year.  Tetanus shot: Get a tetanus shot every 10 years.  Pneumococcal, hepatitis A, and RSV shots: Ask your care team if you need these based on your risk.  Shingles shot (for age 50 and up)  General health tests  Diabetes screening:  Starting at age 35, Get screened for diabetes at least every 3 years.  If you are younger than age 35, ask your care team if you should be screened for diabetes.  Cholesterol test: At age 39, start having a cholesterol test every 5 years, or more often if advised.  Bone density scan (DEXA): At age 50, ask your care team if you should have this scan for osteoporosis (brittle bones).  Hepatitis C: Get tested at least once in your life.  STIs (sexually transmitted infections)  Before age 24: Ask your care team if you should be screened for STIs.  After age 24: Get screened for STIs if you're at risk. You are at risk for STIs (including HIV) if:  You are sexually active with more than one person.  You don't use condoms every time.  You or a partner was diagnosed with a sexually transmitted infection.  If you are at risk for HIV, ask about PrEP medicine to prevent HIV.  Get tested for HIV at least once in your life, whether you are at risk for HIV or not.  Cancer screening tests  Cervical cancer screening: If you have a cervix, begin getting regular cervical cancer screening tests starting at age 21.  Breast cancer scan (mammogram): If you've ever had breasts, begin having regular mammograms starting at age 40. This is a scan to check for breast cancer.  Colon cancer screening: It is important to start screening for colon cancer at age 45.  Have a colonoscopy test every 10 years (or more often if you're at risk) Or, ask your provider about stool tests like a FIT test every year  or Cologuard test every 3 years.  To learn more about your testing options, visit:   .  For help making a decision, visit:   https://bit.ly/tq16277.  Prostate cancer screening test: If you have a prostate, ask your care team if a prostate cancer screening test (PSA) at age 55 is right for you.  Lung cancer screening: If you are a current or former smoker ages 50 to 80, ask your care team if ongoing lung cancer screenings are right for you.  For informational purposes only. Not to replace the advice of your health care provider. Copyright   2023 [employer]. All rights reserved. Clinically reviewed by the [facility] Transitions Program. [employer] [id number] - REV 01/24.

## 2025-01-16 ENCOUNTER — PATIENT OUTREACH (OUTPATIENT)
Dept: CARE COORDINATION | Facility: CLINIC | Age: 45
End: 2025-01-16
Payer: COMMERCIAL

## 2025-01-21 PROBLEM — E66.01 MORBID OBESITY (H): Status: RESOLVED | Noted: 2022-07-10 | Resolved: 2025-01-21

## 2025-02-02 DIAGNOSIS — M79.673 CHRONIC HEEL PAIN, UNSPECIFIED LATERALITY: ICD-10-CM

## 2025-02-02 DIAGNOSIS — G89.29 CHRONIC HEEL PAIN, UNSPECIFIED LATERALITY: ICD-10-CM

## 2025-02-05 ENCOUNTER — TELEPHONE (OUTPATIENT)
Dept: FAMILY MEDICINE | Facility: CLINIC | Age: 45
End: 2025-02-05
Payer: COMMERCIAL

## 2025-02-05 NOTE — LETTER
February 5, 2025      Tanya Denny  [address]  [place] [address]              Dear Tanya,      Here are my comments about your recent results:  -Normal red blood cell (hgb) levels, normal white blood cell count and normal platelet levels.  -Cholesterol levels (LDL,HDL, Triglycerides) are normal.  ADVISE: rechecking in 1 year.  -Liver and gallbladder tests (ALT,AST, Alk phos,bilirubin) are normal.  -Kidney function (GFR) is normal.  -Sodium is normal.  -Potassium is normal.  -Calcium is normal.     -A1C (diabetic test) is normal and indicates that your blood sugar has been in a normal range the last 3 months.  For additional lab test information, [email or web address] is an excellent reference..     Please let us know if you have any questions or concerns.    Component      Latest Ref Rng 1/15/2025  9:23 AM   WBC      4.0 - 11.0 10e3/uL 5.8    RBC Count      3.80 - 5.20 10e6/uL 4.23    Hemoglobin      11.7 - 15.7 g/dL 11.8    Hematocrit      35.0 - 47.0 % 34.7 (L)    MCV      78 - 100 fL 82    MCH      26.5 - 33.0 pg 27.9    MCHC      31.5 - 36.5 g/dL 34.0    RDW      10.0 - 15.0 % 11.9    Platelet Count      150 - 450 10e3/uL 262    % Neutrophils      % 62    % Lymphocytes      % 31    % Monocytes      % 6    % Eosinophils      % 2    % Basophils      % 0    % Immature Granulocytes      % 0    Absolute Neutrophils      1.6 - 8.3 10e3/uL 3.6    Absolute Lymphocytes      0.8 - 5.3 10e3/uL 1.8    Absolute Monocytes      0.0 - 1.3 10e3/uL 0.3    Absolute Eosinophils      0.0 - 0.7 10e3/uL 0.1    Absolute Basophils      0.0 - 0.2 10e3/uL 0.0    Absolute Immature Granulocytes      <=0.4 10e3/uL 0.0    Sodium      135 - 145 mmol/L 139    Potassium      3.4 - 5.3 mmol/L 4.4    Carbon Dioxide (CO2)      22 - 29 mmol/L 26    Anion Gap      7 - 15 mmol/L 7    Urea Nitrogen      6.0 - 20.0 mg/dL 13.2    Creatinine      0.51 - 0.95 mg/dL 0.74    GFR Estimate      >60 mL/min/1.73m2 >90    Calcium      8.8  - 10.4 mg/dL 9.0    Chloride      98 - 107 mmol/L 106    Glucose      70 - 99 mg/dL 112 (H)    Alkaline Phosphatase      40 - 150 U/L 60    AST      0 - 45 U/L 18    ALT      0 - 50 U/L 12    Protein Total      6.4 - 8.3 g/dL 7.2    Albumin      3.5 - 5.2 g/dL 4.1    Bilirubin Total      <=1.2 mg/dL 0.3    Patient Fasting? Yes    Patient Fasting? Yes    Cholesterol      <200 mg/dL 149    Triglycerides      <150 mg/dL 60    HDL Cholesterol      >=50 mg/dL 48 (L)    LDL Cholesterol Calculated      <100 mg/dL 89    Non HDL Cholesterol      <130 mg/dL 101    Estimated Average Glucose      <117 mg/dL 108    Hemoglobin A1C      0.0 - 5.6 % 5.4       Legend:  (L) Low  (H) High     Mele Meyer MD

## 2025-02-05 NOTE — TELEPHONE ENCOUNTER
----- Message from Mele Floyd sent at 2/5/2025  7:59 AM CST -----  Please send the following in a letter to the patient: Hello -     Here are my comments about your recent results:  -Normal red blood cell (hgb) levels, normal white blood cell count and normal platelet levels.  -Cholesterol levels (LDL,HDL, Triglycerides) are normal.  ADVISE: rechecking in 1 year.  -Liver and gallbladder tests (ALT,AST, Alk phos,bilirubin) are normal.  -Kidney function (GFR) is normal.  -Sodium is normal.  -Potassium is normal.  -Calcium is normal.     -A1C (diabetic test) is normal and indicates that your blood sugar has been in a normal range the last 3 months.  For additional lab test information, [email or web address] is an excellent reference..     Please let us know if you have any questions or concerns.     Regards,  Mele Floyd MD

## 2025-03-01 ENCOUNTER — HEALTH MAINTENANCE LETTER (OUTPATIENT)
Age: 45
End: 2025-03-01

## 2025-04-12 ENCOUNTER — TRANSFERRED RECORDS (OUTPATIENT)
Dept: MULTI SPECIALTY CLINIC | Facility: CLINIC | Age: 45
End: 2025-04-12
Payer: COMMERCIAL

## 2025-04-12 LAB — RETINOPATHY: NORMAL

## 2025-05-15 ENCOUNTER — RESULTS FOLLOW-UP (OUTPATIENT)
Dept: FAMILY MEDICINE | Facility: CLINIC | Age: 45
End: 2025-05-15
Payer: COMMERCIAL

## 2025-05-15 NOTE — LETTER
May 20, 2025    Tanya Tera Eduin  [address]  [place] [address]        Dear ,    We are writing to inform you of your test results.    Here are my comments about your recent results:  -Kidney function (GFR) is normal.  -Sodium is normal.  -Potassium is normal.  -Calcium is normal.  -Glucose is slight elevated and may be a sign of early diabetes (prediabetes). ADVISE:: eating a low carbohydrate diet, exercising, trying to lose weight (if necessary) and rechecking your glucose level in 12 months. Of note your A1c 1/2025 was normal.   -Ferritin (iron) level is normal.  -CRP inflammatory marker is within normal limits     For additional lab test information, [email or web address] is an excellent reference.    Resulted Orders   Ferritin   Result Value Ref Range    Ferritin 75 6 - 175 ng/mL   Basic metabolic panel  (Ca, Cl, CO2, Creat, Gluc, K, Na, BUN)   Result Value Ref Range    Sodium 138 135 - 145 mmol/L    Potassium 4.0 3.4 - 5.3 mmol/L    Chloride 104 98 - 107 mmol/L    Carbon Dioxide (CO2) 24 22 - 29 mmol/L    Anion Gap 10 7 - 15 mmol/L    Urea Nitrogen 10.6 6.0 - 20.0 mg/dL    Creatinine 0.75 0.51 - 0.95 mg/dL    GFR Estimate >90 >60 mL/min/1.73m2      Comment:      eGFR calculated using 2021 CKD-EPI equation.    Calcium 9.2 8.8 - 10.4 mg/dL    Glucose 100 (H) 70 - 99 mg/dL   CRP, inflammation   Result Value Ref Range    CRP Inflammation <3.00 <5.00 mg/L   CBC with platelets and differential   Result Value Ref Range    WBC Count 7.0 4.0 - 11.0 10e3/uL    RBC Count 4.43 3.80 - 5.20 10e6/uL    Hemoglobin 12.2 11.7 - 15.7 g/dL    Hematocrit 35.5 35.0 - 47.0 %    MCV 80 78 - 100 fL    MCH 27.5 26.5 - 33.0 pg    MCHC 34.4 31.5 - 36.5 g/dL    RDW 11.7 10.0 - 15.0 %    Platelet Count 245 150 - 450 10e3/uL    % Neutrophils 67 %    % Lymphocytes 27 %    % Monocytes 6 %    % Eosinophils 1 %    % Basophils 0 %    % Immature Granulocytes 0 %    Absolute Neutrophils 4.7 1.6 - 8.3 10e3/uL    Absolute  Lymphocytes 1.9 0.8 - 5.3 10e3/uL    Absolute Monocytes 0.4 0.0 - 1.3 10e3/uL    Absolute Eosinophils 0.1 0.0 - 0.7 10e3/uL    Absolute Basophils 0.0 0.0 - 0.2 10e3/uL    Absolute Immature Granulocytes 0.0 <=0.4 10e3/uL       If you have any questions or concerns, please call the clinic at the number listed above.       Sincerely,      Mele Floyd MD    Electronically signed

## 2025-06-24 DIAGNOSIS — M79.673 CHRONIC HEEL PAIN, UNSPECIFIED LATERALITY: ICD-10-CM

## 2025-06-24 DIAGNOSIS — G89.29 CHRONIC HEEL PAIN, UNSPECIFIED LATERALITY: ICD-10-CM

## 2025-06-24 DIAGNOSIS — G43.719 INTRACTABLE CHRONIC MIGRAINE WITHOUT AURA AND WITHOUT STATUS MIGRAINOSUS: ICD-10-CM

## 2025-06-24 RX ORDER — NAPROXEN 500 MG/1
TABLET ORAL
Qty: 60 TABLET | Refills: 0 | Status: SHIPPED | OUTPATIENT
Start: 2025-06-24

## 2025-06-25 DIAGNOSIS — G43.719 INTRACTABLE CHRONIC MIGRAINE WITHOUT AURA AND WITHOUT STATUS MIGRAINOSUS: ICD-10-CM

## 2025-06-26 RX ORDER — NAPROXEN 500 MG/1
TABLET ORAL
Qty: 60 TABLET | Refills: 0 | OUTPATIENT
Start: 2025-06-26

## 2025-06-30 DIAGNOSIS — N76.0 VAGINITIS AND VULVOVAGINITIS: Primary | ICD-10-CM

## 2025-06-30 RX ORDER — FLUCONAZOLE 150 MG/1
150 TABLET ORAL
Qty: 3 TABLET | Refills: 0 | Status: SHIPPED | OUTPATIENT
Start: 2025-06-30 | End: 2025-07-07

## 2025-07-04 ENCOUNTER — NURSE TRIAGE (OUTPATIENT)
Dept: FAMILY MEDICINE | Facility: CLINIC | Age: 45
End: 2025-07-04
Payer: COMMERCIAL

## 2025-07-07 NOTE — TELEPHONE ENCOUNTER
RN Triage    Patient Contact    Attempt # 1    Was call answered?  No.  Left message on voicemail with information to call me back.    Upon call back please triage patient.    Jus Cowart RN  [facility] Triage [facility]  July 7, 2025

## 2025-07-07 NOTE — TELEPHONE ENCOUNTER
Nurse Triage SBAR    Is this a 2nd Level Triage? NO    Situation: patient is calling in regarding back pain. Patient states the back pain has been present x1 month. Patient denies any injuries. Pain is in lower back, across all lower back. Denies pain or burning with urination. Denies blood in urine or pink tinged urine. Denies fever. Denies n/t or pain radiating down either leg. Pain is 6-7/10, no matter how she moves or if at rest.  Tylenol or ibuprofen will help a little.     Background: back pain x1 month.     Assessment: advised to be seen within 3 days.     Protocol Recommended Disposition:   See in Office Within 3 Days    Recommendation: patient verbalized understanding and is agreeable.  Scheduled on Wednesday with PCP.  Will call clinic back if any symptoms worsen.            Does the patient meet one of the following criteria for ADS visit consideration? 16+ years old, with an [facility] PCP     TIP  Providers, please consider if this condition is appropriate for management at one of our Acute and Diagnostic Services sites.     If patient is a good candidate, please use dotphrase <dot>triageresponse and select Refer to ADS to document.      Reason for Disposition   MODERATE back pain (e.g., interferes with normal activities) and present > 3 days    Additional Information   Negative: Passed out (e.g., fainted, lost consciousness, blacked out and was not responding)   Negative: Shock suspected (e.g., cold/pale/clammy skin, too weak to stand, low BP, rapid pulse)   Negative: Sounds like a life-threatening emergency to the triager   Negative: Major injury to the back (e.g., MVA, fall > 10 feet or 3 meters, penetrating injury, etc.)   Negative: Pain in the upper back over the ribs (rib cage) that radiates (travels) into the chest   Negative: Pain in the upper back over the ribs (rib cage) and worsened by coughing (or clearly increases with breathing)   Negative: Back pain during pregnancy   Negative: SEVERE back  pain of sudden onset and age > 60 years   Negative: SEVERE abdominal pain (e.g., excruciating)   Negative: Abdominal pain and age > 60 years   Negative: Unable to urinate (or only a few drops) and bladder feels very full   Negative: Loss of bladder or bowel control (urine or bowel incontinence; wetting self, leaking stool) of new-onset   Negative: Numbness (loss of sensation) in groin or rectal area   Negative: Pain radiates into groin, scrotum   Negative: Blood in urine (red, pink, or tea-colored)   Negative: Vomiting and pain over lower ribs of back (i.e., flank - kidney area)   Negative: Weakness of a leg or foot (e.g., unable to bear weight, dragging foot)   Negative: Patient sounds very sick or weak to the triager   Negative: Fever > 100.4 F (38.0 C) and flank pain   Negative: Pain or burning with passing urine (urination)   Negative: SEVERE back pain (e.g., excruciating, unable to do any normal activities) and not improved after pain medicine and CARE ADVICE   Negative: Numbness in an arm or hand (i.e., loss of sensation) and upper back pain   Negative: Numbness in a leg or foot (i.e., loss of sensation)   Negative: High-risk adult (e.g., history of cancer, history of HIV, or history of IV Drug Use)   Negative: Soft tissue infection (e.g., abscess, cellulitis) or other serious infection (e.g., bacteremia) in last 2 weeks   Negative: Painful rash with multiple small blisters grouped together (i.e., dermatomal distribution or 'band' or 'stripe')   Negative: Pain radiates into the thigh or further down the leg, and in both legs   Negative: Age > 50 and no history of prior similar back pain    Protocols used: Back Pain-A-[place]

## 2025-07-09 ENCOUNTER — OFFICE VISIT (OUTPATIENT)
Dept: FAMILY MEDICINE | Facility: CLINIC | Age: 45
End: 2025-07-09
Payer: COMMERCIAL

## 2025-07-09 VITALS
WEIGHT: 198.5 LBS | SYSTOLIC BLOOD PRESSURE: 118 MMHG | HEART RATE: 70 BPM | TEMPERATURE: 97.1 F | BODY MASS INDEX: 35.17 KG/M2 | HEIGHT: 63 IN | OXYGEN SATURATION: 100 % | DIASTOLIC BLOOD PRESSURE: 70 MMHG | RESPIRATION RATE: 18 BRPM

## 2025-07-09 DIAGNOSIS — E66.812 CLASS 2 SEVERE OBESITY DUE TO EXCESS CALORIES WITH SERIOUS COMORBIDITY AND BODY MASS INDEX (BMI) OF 35.0 TO 35.9 IN ADULT (H): ICD-10-CM

## 2025-07-09 DIAGNOSIS — E66.01 CLASS 2 SEVERE OBESITY DUE TO EXCESS CALORIES WITH SERIOUS COMORBIDITY AND BODY MASS INDEX (BMI) OF 35.0 TO 35.9 IN ADULT (H): ICD-10-CM

## 2025-07-09 DIAGNOSIS — E66.01 CLASS 2 SEVERE OBESITY DUE TO EXCESS CALORIES WITH SERIOUS COMORBIDITY AND BODY MASS INDEX (BMI) OF 38.0 TO 38.9 IN ADULT (H): ICD-10-CM

## 2025-07-09 DIAGNOSIS — R49.9 HOARSENESS OR CHANGING VOICE: ICD-10-CM

## 2025-07-09 DIAGNOSIS — K21.00 GASTROESOPHAGEAL REFLUX DISEASE WITH ESOPHAGITIS, UNSPECIFIED WHETHER HEMORRHAGE: Primary | ICD-10-CM

## 2025-07-09 DIAGNOSIS — E66.812 CLASS 2 SEVERE OBESITY DUE TO EXCESS CALORIES WITH SERIOUS COMORBIDITY AND BODY MASS INDEX (BMI) OF 38.0 TO 38.9 IN ADULT (H): ICD-10-CM

## 2025-07-09 DIAGNOSIS — Z12.31 ENCOUNTER FOR SCREENING MAMMOGRAM FOR BREAST CANCER: ICD-10-CM

## 2025-07-09 DIAGNOSIS — R13.12 OROPHARYNGEAL DYSPHAGIA: ICD-10-CM

## 2025-07-09 DIAGNOSIS — S29.012A STRAIN OF BOTH LATISSIMUS DORSI MUSCLES: ICD-10-CM

## 2025-07-09 PROCEDURE — 1125F AMNT PAIN NOTED PAIN PRSNT: CPT | Performed by: FAMILY MEDICINE

## 2025-07-09 PROCEDURE — 3074F SYST BP LT 130 MM HG: CPT | Performed by: FAMILY MEDICINE

## 2025-07-09 PROCEDURE — 99214 OFFICE O/P EST MOD 30 MIN: CPT | Performed by: FAMILY MEDICINE

## 2025-07-09 PROCEDURE — 3078F DIAST BP <80 MM HG: CPT | Performed by: FAMILY MEDICINE

## 2025-07-09 RX ORDER — CYCLOBENZAPRINE HCL 10 MG
10 TABLET ORAL 3 TIMES DAILY PRN
Qty: 42 TABLET | Refills: 2 | Status: SHIPPED | OUTPATIENT
Start: 2025-07-09

## 2025-07-09 RX ORDER — OMEPRAZOLE 40 MG/1
40 CAPSULE, DELAYED RELEASE ORAL DAILY
Qty: 90 CAPSULE | Refills: 2 | Status: SHIPPED | OUTPATIENT
Start: 2025-07-09

## 2025-07-09 RX ORDER — PHENTERMINE HYDROCHLORIDE 37.5 MG/1
37.5 TABLET ORAL
Qty: 30 TABLET | Refills: 3 | Status: SHIPPED | OUTPATIENT
Start: 2025-07-09

## 2025-07-09 ASSESSMENT — PAIN SCALES - GENERAL: PAINLEVEL_OUTOF10: SEVERE PAIN (7)

## 2025-07-09 NOTE — PROGRESS NOTES
Assessment & Plan     Gastroesophageal reflux disease with esophagitis, unspecified whether hemorrhage  - omeprazole (PRILOSEC) 40 MG DR capsule; Take 1 capsule (40 mg) by mouth daily.  -Increased PPI to 40 mg daily    Oropharyngeal dysphagia  - XR Esophagram w Upper GI; Future    Hoarseness or changing voice  - XR Esophagram w Upper GI; Future  - Speech Therapy  Referral; Future    Strain of both latissimus dorsi muscles  Alternate between ice and heat  Back stretches encouraged.   - Physical Therapy  Referral; Future    Encounter for screening mammogram for breast cancer  - MA Screen Bilateral w/[place]; Future    Class 2 severe obesity due to excess calories with serious comorbidity and body mass index (BMI) of 35.0 to 35.9 in adult (H)      Ange Martínez is a 45 year old, presenting for the following health issues:  Back Pain and Foot Pain      7/9/2025     1:18 PM   Additional Questions   Roomed by VE       Dysphagia  Patient complains of difficulty swallowing. The dysphagia occurs with both solids and liquids Symptoms have been present for approximately 1 months. The symptoms are stable. The dysphagia has been intermittent and has not been progressive.  She burning sensation in stomach and indigestion.  She denies melena, hematochezia, hematemesis, and coffee ground emesis.  This has been associated with abdominal bloating and deep pressure at base of neck, changes in voice.   She denies no other symptoms.      Pain History:  When did you first notice your pain? 1 month   Have you seen anyone else for your pain? No  How has your pain affected your ability to work? Not applicable  Where in your body do you have pain? Back Pain  Onset/Duration: 1 month  Description:   Location of pain: low back bilateral  Character of pain: sharp, dull ache  Pain radiation: none  New numbness or weakness in legs, not attributed to pain: no   Intensity: Currently 6/10  Progression of Symptoms: same  History:  "  Specific cause: none  Pain interferes with job: No  History of back problems: recurrent self limited episodes of low back pain in the past  Any previous MRI or X-rays: Yes  Sees a specialist for back pain: No  Alleviating factors:   Improved by: Muscle relaxer, ibuprofen    Precipitating factors:  Worsened by: Standing  Therapies tried and outcome: muscle relaxants    Accompanying Signs & Symptoms:  Risk of Fracture: None  Risk of Cauda Equina: None  Risk of Infection: None  Risk of Cancer: None  Risk of Ankylosing Spondylitis: Onset at age <35, male, AND morning back stiffness No    57}  Exam End: 07/16/21  1:34 PM     FINDINGS: Presuming 5 lumbar type vertebral bodies, the lumbar spine is aligned. The marrow signal is normal. The vertebral body height is preserved. There is mild degeneration of the L5-S1 intervertebral disc. The conus medullaris is not well seen.    Findings at specific interspaces:    L1-2: Normal    L2-3: Normal    L3-4: Normal    L4-5: Very minimal disc bulge. No central canal nor neural foraminal stenosis. The facet joints are unremarkable.    L5-S1: There is a minimal disc bulge. There is a very small central/right paracentral disc protrusion with annular tear that abuts the traversing right S1 nerve root. No central spinal canal stenosis. The neural foramina are patent bilaterally. There is very mild facet arthropathy.        Review of Systems  Constitutional, HEENT, cardiovascular, pulmonary, GI, , musculoskeletal, neuro, skin, endocrine and psych systems are negative, except as otherwise noted.      Objective    /70 (BP Location: Left arm, Patient Position: Chair, Cuff Size: Adult Regular)   Pulse 70   Temp 97.1  F (36.2  C) (Temporal)   Resp 18   Ht 1.6 m (5' 3\")   Wt 90 kg (198 lb 8 oz)   LMP 06/27/2025 (Exact Date)   SpO2 100%   Breastfeeding No   BMI 35.16 kg/m    Body mass index is 35.16 kg/m .  Physical Exam   GENERAL: alert and no distress  NECK: no adenopathy, no " asymmetry, masses, or scars  RESP: lungs clear to auscultation - no rales, rhonchi or wheezes  CV: regular rate and rhythm, normal S1 S2, no S3 or S4, no murmur, click or rub, no peripheral edema  ABDOMEN: soft, nontender, no hepatosplenomegaly, no masses and bowel sounds normal  Comprehensive back pain exam:  Para lumbar tenderness L 1-3., Range of motion not limited by pain, Lower extremity strength functional and equal on both sides, Lower extremity reflexes within normal limits bilaterally, Lower extremity sensation normal and equal on both sides, and Straight leg raise negative bilaterally            Signed Electronically by: Mele Floyd MD    Answers submitted by the patient for this visit:  General Questionnaire (Submitted on 7/9/2025)  Chief Complaint: Chronic problems general questions HPI Form  What is the reason for your visit today? : backpage  How many days per week do you miss taking your medication?: 7  Questionnaire about: Chronic problems general questions HPI Form (Submitted on 7/9/2025)  Chief Complaint: Chronic problems general questions HPI Form

## 2025-07-11 ENCOUNTER — ANCILLARY PROCEDURE (OUTPATIENT)
Dept: GENERAL RADIOLOGY | Facility: CLINIC | Age: 45
End: 2025-07-11
Attending: FAMILY MEDICINE
Payer: COMMERCIAL

## 2025-07-11 DIAGNOSIS — R13.12 OROPHARYNGEAL DYSPHAGIA: ICD-10-CM

## 2025-07-11 DIAGNOSIS — R49.9 HOARSENESS OR CHANGING VOICE: ICD-10-CM

## 2025-07-11 PROCEDURE — 74246 X-RAY XM UPR GI TRC 2CNTRST: CPT | Mod: GC | Performed by: STUDENT IN AN ORGANIZED HEALTH CARE EDUCATION/TRAINING PROGRAM

## 2025-08-21 ENCOUNTER — THERAPY VISIT (OUTPATIENT)
Dept: SPEECH THERAPY | Facility: CLINIC | Age: 45
End: 2025-08-21
Payer: COMMERCIAL

## 2025-08-21 DIAGNOSIS — R49.0 DYSPHONIA: Primary | ICD-10-CM
